# Patient Record
Sex: FEMALE | Race: WHITE | NOT HISPANIC OR LATINO | Employment: OTHER | URBAN - METROPOLITAN AREA
[De-identification: names, ages, dates, MRNs, and addresses within clinical notes are randomized per-mention and may not be internally consistent; named-entity substitution may affect disease eponyms.]

---

## 2019-03-21 ENCOUNTER — RECORDS - HEALTHEAST (OUTPATIENT)
Dept: LAB | Facility: CLINIC | Age: 79
End: 2019-03-21

## 2019-03-21 LAB
HGB BLD-MCNC: 9.4 G/DL (ref 12–16)
PLATELET # BLD AUTO: 282 THOU/UL (ref 140–440)

## 2019-03-27 ENCOUNTER — RECORDS - HEALTHEAST (OUTPATIENT)
Dept: LAB | Facility: CLINIC | Age: 79
End: 2019-03-27

## 2019-03-28 LAB — PLATELET # BLD AUTO: 336 THOU/UL (ref 140–440)

## 2021-09-02 ENCOUNTER — TRANSFERRED RECORDS (OUTPATIENT)
Dept: HEALTH INFORMATION MANAGEMENT | Facility: CLINIC | Age: 81
End: 2021-09-02

## 2021-09-02 LAB
ALBUMIN UR-MCNC: 30 MG/DL
ANION GAP SERPL CALCULATED.3IONS-SCNC: 9 MMOL/L (ref 5–18)
APPEARANCE UR: ABNORMAL
BACTERIA #/AREA URNS HPF: ABNORMAL /HPF
BASOPHILS # BLD AUTO: 0 10E3/UL (ref 0–0.2)
BASOPHILS NFR BLD AUTO: 0 %
BILIRUB UR QL STRIP: NEGATIVE
BUN SERPL-MCNC: 12 MG/DL (ref 8–28)
CALCIUM SERPL-MCNC: 9.4 MG/DL (ref 8.5–10.5)
CHLORIDE BLD-SCNC: 101 MMOL/L (ref 98–107)
CO2 SERPL-SCNC: 28 MMOL/L (ref 22–31)
COLOR UR AUTO: YELLOW
CREAT SERPL-MCNC: 0.78 MG/DL (ref 0.6–1.1)
EOSINOPHIL # BLD AUTO: 0 10E3/UL (ref 0–0.7)
EOSINOPHIL NFR BLD AUTO: 0 %
ERYTHROCYTE [DISTWIDTH] IN BLOOD BY AUTOMATED COUNT: 14.8 % (ref 10–15)
GFR SERPL CREATININE-BSD FRML MDRD: 73 ML/MIN/1.73M2
GLUCOSE BLD-MCNC: 115 MG/DL (ref 70–125)
GLUCOSE UR STRIP-MCNC: NEGATIVE MG/DL
HCT VFR BLD AUTO: 36 % (ref 35–47)
HGB BLD-MCNC: 11.5 G/DL (ref 11.7–15.7)
HGB UR QL STRIP: ABNORMAL
IMM GRANULOCYTES # BLD: 0.1 10E3/UL
IMM GRANULOCYTES NFR BLD: 1 %
KETONES UR STRIP-MCNC: 5 MG/DL
LEUKOCYTE ESTERASE UR QL STRIP: ABNORMAL
LYMPHOCYTES # BLD AUTO: 0.9 10E3/UL (ref 0.8–5.3)
LYMPHOCYTES NFR BLD AUTO: 7 %
MCH RBC QN AUTO: 33.3 PG (ref 26.5–33)
MCHC RBC AUTO-ENTMCNC: 31.9 G/DL (ref 31.5–36.5)
MCV RBC AUTO: 104 FL (ref 78–100)
MONOCYTES # BLD AUTO: 1.9 10E3/UL (ref 0–1.3)
MONOCYTES NFR BLD AUTO: 13 %
NEUTROPHILS # BLD AUTO: 11.3 10E3/UL (ref 1.6–8.3)
NEUTROPHILS NFR BLD AUTO: 79 %
NITRATE UR QL: NEGATIVE
NRBC # BLD AUTO: 0 10E3/UL
NRBC BLD AUTO-RTO: 0 /100
PH UR STRIP: 5.5 [PH] (ref 5–7)
PLATELET # BLD AUTO: 209 10E3/UL (ref 150–450)
POTASSIUM BLD-SCNC: 4 MMOL/L (ref 3.5–5)
RBC # BLD AUTO: 3.45 10E6/UL (ref 3.8–5.2)
RBC URINE: 25 /HPF
SODIUM SERPL-SCNC: 138 MMOL/L (ref 136–145)
SP GR UR STRIP: 1.02 (ref 1–1.03)
SQUAMOUS EPITHELIAL: 0 /HPF
UROBILINOGEN UR STRIP-MCNC: NORMAL MG/DL
WBC # BLD AUTO: 14.2 10E3/UL (ref 4–11)
WBC URINE: >100 /HPF

## 2021-09-02 PROCEDURE — 96365 THER/PROPH/DIAG IV INF INIT: CPT

## 2021-09-02 PROCEDURE — 250N000011 HC RX IP 250 OP 636: Performed by: EMERGENCY MEDICINE

## 2021-09-02 PROCEDURE — 81001 URINALYSIS AUTO W/SCOPE: CPT | Performed by: EMERGENCY MEDICINE

## 2021-09-02 PROCEDURE — 96375 TX/PRO/DX INJ NEW DRUG ADDON: CPT

## 2021-09-02 PROCEDURE — 36415 COLL VENOUS BLD VENIPUNCTURE: CPT | Performed by: EMERGENCY MEDICINE

## 2021-09-02 PROCEDURE — 84484 ASSAY OF TROPONIN QUANT: CPT | Performed by: EMERGENCY MEDICINE

## 2021-09-02 PROCEDURE — 99285 EMERGENCY DEPT VISIT HI MDM: CPT | Mod: 25

## 2021-09-02 PROCEDURE — 85025 COMPLETE CBC W/AUTO DIFF WBC: CPT | Performed by: EMERGENCY MEDICINE

## 2021-09-02 PROCEDURE — 80048 BASIC METABOLIC PNL TOTAL CA: CPT | Performed by: EMERGENCY MEDICINE

## 2021-09-02 PROCEDURE — C9803 HOPD COVID-19 SPEC COLLECT: HCPCS

## 2021-09-02 PROCEDURE — 87086 URINE CULTURE/COLONY COUNT: CPT | Performed by: EMERGENCY MEDICINE

## 2021-09-02 RX ORDER — ONDANSETRON 4 MG/1
4 TABLET, ORALLY DISINTEGRATING ORAL ONCE
Status: COMPLETED | OUTPATIENT
Start: 2021-09-02 | End: 2021-09-02

## 2021-09-02 RX ADMIN — ONDANSETRON 4 MG: 4 TABLET, ORALLY DISINTEGRATING ORAL at 23:59

## 2021-09-02 ASSESSMENT — MIFFLIN-ST. JEOR: SCORE: 1217.51

## 2021-09-03 ENCOUNTER — APPOINTMENT (OUTPATIENT)
Dept: CT IMAGING | Facility: CLINIC | Age: 81
DRG: 872 | End: 2021-09-03
Attending: EMERGENCY MEDICINE
Payer: COMMERCIAL

## 2021-09-03 ENCOUNTER — HOSPITAL ENCOUNTER (INPATIENT)
Facility: CLINIC | Age: 81
LOS: 2 days | Discharge: HOME | DRG: 872 | End: 2021-09-05
Attending: EMERGENCY MEDICINE | Admitting: HOSPITALIST
Payer: COMMERCIAL

## 2021-09-03 DIAGNOSIS — R11.2 NON-INTRACTABLE VOMITING WITH NAUSEA, UNSPECIFIED VOMITING TYPE: ICD-10-CM

## 2021-09-03 DIAGNOSIS — N30.00 ACUTE CYSTITIS WITHOUT HEMATURIA: Primary | ICD-10-CM

## 2021-09-03 DIAGNOSIS — N95.2 ATROPHIC VAGINITIS: ICD-10-CM

## 2021-09-03 DIAGNOSIS — I48.91 ATRIAL FIBRILLATION WITH RVR (H): ICD-10-CM

## 2021-09-03 DIAGNOSIS — N30.01 ACUTE CYSTITIS WITH HEMATURIA: ICD-10-CM

## 2021-09-03 LAB
FOLATE SERPL-MCNC: 15.6 NG/ML
LACTATE SERPL-SCNC: 1.4 MMOL/L (ref 0.7–2)
SARS-COV-2 RNA RESP QL NAA+PROBE: NEGATIVE
TROPONIN I SERPL-MCNC: <0.01 NG/ML (ref 0–0.29)
VIT B12 SERPL-MCNC: 284 PG/ML (ref 213–816)

## 2021-09-03 PROCEDURE — 36415 COLL VENOUS BLD VENIPUNCTURE: CPT | Performed by: EMERGENCY MEDICINE

## 2021-09-03 PROCEDURE — 93005 ELECTROCARDIOGRAM TRACING: CPT | Performed by: EMERGENCY MEDICINE

## 2021-09-03 PROCEDURE — 82607 VITAMIN B-12: CPT | Performed by: HOSPITALIST

## 2021-09-03 PROCEDURE — 250N000011 HC RX IP 250 OP 636: Performed by: HOSPITALIST

## 2021-09-03 PROCEDURE — 36415 COLL VENOUS BLD VENIPUNCTURE: CPT | Performed by: HOSPITALIST

## 2021-09-03 PROCEDURE — 87040 BLOOD CULTURE FOR BACTERIA: CPT | Performed by: EMERGENCY MEDICINE

## 2021-09-03 PROCEDURE — 250N000013 HC RX MED GY IP 250 OP 250 PS 637: Performed by: FAMILY MEDICINE

## 2021-09-03 PROCEDURE — 250N000013 HC RX MED GY IP 250 OP 250 PS 637: Performed by: HOSPITALIST

## 2021-09-03 PROCEDURE — 74176 CT ABD & PELVIS W/O CONTRAST: CPT

## 2021-09-03 PROCEDURE — 99233 SBSQ HOSP IP/OBS HIGH 50: CPT | Performed by: FAMILY MEDICINE

## 2021-09-03 PROCEDURE — 87635 SARS-COV-2 COVID-19 AMP PRB: CPT | Performed by: EMERGENCY MEDICINE

## 2021-09-03 PROCEDURE — 120N000001 HC R&B MED SURG/OB

## 2021-09-03 PROCEDURE — 82746 ASSAY OF FOLIC ACID SERUM: CPT | Performed by: HOSPITALIST

## 2021-09-03 PROCEDURE — 99223 1ST HOSP IP/OBS HIGH 75: CPT | Performed by: HOSPITALIST

## 2021-09-03 PROCEDURE — 250N000009 HC RX 250: Performed by: EMERGENCY MEDICINE

## 2021-09-03 PROCEDURE — 258N000003 HC RX IP 258 OP 636: Performed by: EMERGENCY MEDICINE

## 2021-09-03 PROCEDURE — 83605 ASSAY OF LACTIC ACID: CPT | Performed by: EMERGENCY MEDICINE

## 2021-09-03 PROCEDURE — 258N000003 HC RX IP 258 OP 636: Performed by: HOSPITALIST

## 2021-09-03 PROCEDURE — 250N000011 HC RX IP 250 OP 636: Performed by: EMERGENCY MEDICINE

## 2021-09-03 RX ORDER — TRAZODONE HYDROCHLORIDE 50 MG/1
50 TABLET, FILM COATED ORAL AT BEDTIME
COMMUNITY

## 2021-09-03 RX ORDER — CEFTRIAXONE 1 G/1
1 INJECTION, POWDER, FOR SOLUTION INTRAMUSCULAR; INTRAVENOUS ONCE
Status: COMPLETED | OUTPATIENT
Start: 2021-09-03 | End: 2021-09-03

## 2021-09-03 RX ORDER — METOPROLOL SUCCINATE 50 MG/1
50 TABLET, EXTENDED RELEASE ORAL AT BEDTIME
COMMUNITY

## 2021-09-03 RX ORDER — PANTOPRAZOLE SODIUM 20 MG/1
20 TABLET, DELAYED RELEASE ORAL
Status: DISCONTINUED | OUTPATIENT
Start: 2021-09-04 | End: 2021-09-05 | Stop reason: HOSPADM

## 2021-09-03 RX ORDER — ONDANSETRON 4 MG/1
4 TABLET, ORALLY DISINTEGRATING ORAL EVERY 6 HOURS PRN
Status: DISCONTINUED | OUTPATIENT
Start: 2021-09-03 | End: 2021-09-05 | Stop reason: HOSPADM

## 2021-09-03 RX ORDER — CEFTRIAXONE 1 G/1
1 INJECTION, POWDER, FOR SOLUTION INTRAMUSCULAR; INTRAVENOUS EVERY 24 HOURS
Status: DISCONTINUED | OUTPATIENT
Start: 2021-09-04 | End: 2021-09-05 | Stop reason: HOSPADM

## 2021-09-03 RX ORDER — ONDANSETRON 2 MG/ML
4 INJECTION INTRAMUSCULAR; INTRAVENOUS ONCE
Status: COMPLETED | OUTPATIENT
Start: 2021-09-03 | End: 2021-09-03

## 2021-09-03 RX ORDER — MECOBALAMIN 5000 MCG
15 TABLET,DISINTEGRATING ORAL EVERY MORNING
Status: DISCONTINUED | OUTPATIENT
Start: 2021-09-04 | End: 2021-09-03 | Stop reason: CLARIF

## 2021-09-03 RX ORDER — METOPROLOL SUCCINATE 25 MG/1
25 TABLET, EXTENDED RELEASE ORAL DAILY
Status: DISCONTINUED | OUTPATIENT
Start: 2021-09-03 | End: 2021-09-05 | Stop reason: HOSPADM

## 2021-09-03 RX ORDER — LEUCOVORIN CALCIUM 5 MG/1
5 TABLET ORAL
COMMUNITY
End: 2023-07-02

## 2021-09-03 RX ORDER — METOPROLOL TARTRATE 1 MG/ML
5 INJECTION, SOLUTION INTRAVENOUS ONCE
Status: COMPLETED | OUTPATIENT
Start: 2021-09-03 | End: 2021-09-03

## 2021-09-03 RX ORDER — ACETAMINOPHEN 500 MG
1000 TABLET ORAL AT BEDTIME
COMMUNITY

## 2021-09-03 RX ORDER — ACETAMINOPHEN 325 MG/1
650 TABLET ORAL EVERY 6 HOURS PRN
Status: DISCONTINUED | OUTPATIENT
Start: 2021-09-03 | End: 2021-09-05 | Stop reason: HOSPADM

## 2021-09-03 RX ORDER — PREDNISONE 1 MG/1
3 TABLET ORAL EVERY MORNING
Status: DISCONTINUED | OUTPATIENT
Start: 2021-09-04 | End: 2021-09-05 | Stop reason: HOSPADM

## 2021-09-03 RX ORDER — CEPHALEXIN 500 MG/1
500 CAPSULE ORAL 3 TIMES DAILY
Status: ON HOLD | COMMUNITY
End: 2021-09-03

## 2021-09-03 RX ORDER — ACETAMINOPHEN 650 MG/1
650 SUPPOSITORY RECTAL EVERY 6 HOURS PRN
Status: DISCONTINUED | OUTPATIENT
Start: 2021-09-03 | End: 2021-09-05 | Stop reason: HOSPADM

## 2021-09-03 RX ORDER — PREDNISONE 5 MG/1
10 TABLET ORAL EVERY MORNING
COMMUNITY

## 2021-09-03 RX ORDER — SODIUM CHLORIDE, SODIUM LACTATE, POTASSIUM CHLORIDE, CALCIUM CHLORIDE 600; 310; 30; 20 MG/100ML; MG/100ML; MG/100ML; MG/100ML
INJECTION, SOLUTION INTRAVENOUS CONTINUOUS
Status: DISCONTINUED | OUTPATIENT
Start: 2021-09-03 | End: 2021-09-05 | Stop reason: HOSPADM

## 2021-09-03 RX ORDER — TRAZODONE HYDROCHLORIDE 50 MG/1
50 TABLET, FILM COATED ORAL AT BEDTIME
Status: DISCONTINUED | OUTPATIENT
Start: 2021-09-03 | End: 2021-09-05 | Stop reason: HOSPADM

## 2021-09-03 RX ORDER — LIDOCAINE 40 MG/G
CREAM TOPICAL
Status: DISCONTINUED | OUTPATIENT
Start: 2021-09-03 | End: 2021-09-05 | Stop reason: HOSPADM

## 2021-09-03 RX ORDER — ONDANSETRON 2 MG/ML
4 INJECTION INTRAMUSCULAR; INTRAVENOUS EVERY 6 HOURS PRN
Status: DISCONTINUED | OUTPATIENT
Start: 2021-09-03 | End: 2021-09-05 | Stop reason: HOSPADM

## 2021-09-03 RX ORDER — MECOBALAMIN 5000 MCG
15 TABLET,DISINTEGRATING ORAL EVERY MORNING
COMMUNITY

## 2021-09-03 RX ORDER — ATOVAQUONE 750 MG/5ML
1500 SUSPENSION ORAL EVERY MORNING
COMMUNITY
End: 2023-07-02

## 2021-09-03 RX ORDER — ALENDRONATE SODIUM 70 MG/1
70 TABLET ORAL
COMMUNITY
End: 2023-07-02

## 2021-09-03 RX ORDER — ESTRADIOL 0.1 MG/G
1 CREAM VAGINAL
COMMUNITY
End: 2023-07-02

## 2021-09-03 RX ADMIN — ACETAMINOPHEN 650 MG: 325 TABLET ORAL at 20:34

## 2021-09-03 RX ADMIN — ONDANSETRON 4 MG: 2 INJECTION INTRAMUSCULAR; INTRAVENOUS at 08:41

## 2021-09-03 RX ADMIN — METOPROLOL SUCCINATE 25 MG: 25 TABLET, EXTENDED RELEASE ORAL at 12:16

## 2021-09-03 RX ADMIN — TRAZODONE HYDROCHLORIDE 50 MG: 50 TABLET ORAL at 23:35

## 2021-09-03 RX ADMIN — ONDANSETRON 4 MG: 2 INJECTION INTRAMUSCULAR; INTRAVENOUS at 04:49

## 2021-09-03 RX ADMIN — SODIUM CHLORIDE, POTASSIUM CHLORIDE, SODIUM LACTATE AND CALCIUM CHLORIDE: 600; 310; 30; 20 INJECTION, SOLUTION INTRAVENOUS at 08:46

## 2021-09-03 RX ADMIN — APIXABAN 5 MG: 5 TABLET, FILM COATED ORAL at 20:35

## 2021-09-03 RX ADMIN — CEFTRIAXONE 1 G: 1 INJECTION, POWDER, FOR SOLUTION INTRAMUSCULAR; INTRAVENOUS at 04:09

## 2021-09-03 RX ADMIN — SODIUM CHLORIDE, POTASSIUM CHLORIDE, SODIUM LACTATE AND CALCIUM CHLORIDE: 600; 310; 30; 20 INJECTION, SOLUTION INTRAVENOUS at 15:28

## 2021-09-03 RX ADMIN — METOPROLOL TARTRATE 5 MG: 1 INJECTION, SOLUTION INTRAVENOUS at 06:56

## 2021-09-03 RX ADMIN — APIXABAN 5 MG: 5 TABLET, FILM COATED ORAL at 12:16

## 2021-09-03 RX ADMIN — METOPROLOL TARTRATE 5 MG: 1 INJECTION, SOLUTION INTRAVENOUS at 05:35

## 2021-09-03 RX ADMIN — SODIUM CHLORIDE 1000 ML: 9 INJECTION, SOLUTION INTRAVENOUS at 04:27

## 2021-09-03 RX ADMIN — ACETAMINOPHEN 650 MG: 325 TABLET ORAL at 15:40

## 2021-09-03 ASSESSMENT — ACTIVITIES OF DAILY LIVING (ADL)
FALL_HISTORY_WITHIN_LAST_SIX_MONTHS: NO
DIFFICULTY_EATING/SWALLOWING: NO
HEARING_DIFFICULTY_OR_DEAF: NO
WEAR_GLASSES_OR_BLIND: NO
DRESSING/BATHING_DIFFICULTY: NO
TOILETING_ISSUES: NO
DOING_ERRANDS_INDEPENDENTLY_DIFFICULTY: NO
WALKING_OR_CLIMBING_STAIRS_DIFFICULTY: NO
CONCENTRATING,_REMEMBERING_OR_MAKING_DECISIONS_DIFFICULTY: NO
DIFFICULTY_COMMUNICATING: NO
EQUIPMENT_CURRENTLY_USED_AT_HOME: GRAB BAR, TUB/SHOWER

## 2021-09-03 NOTE — PHARMACY-ADMISSION MEDICATION HISTORY
Pharmacy Note - Admission Medication History    Pertinent Provider Information:     Patient on cephalexin 500mg TID for UTI- reports that she started the course yesterday (9/2/21) and took one dose yesterday    Patient finished course of prednisone 20mg daily on Wednesday (9/1/21) and now takes 3mg daily.    Patient due for weekly methotrexate today (9/3/21)   ______________________________________________________________________    Prior To Admission (PTA) med list completed and updated in EMR.       PTA Med List   Medication Sig Note Last Dose     acetaminophen (TYLENOL) 325 MG tablet Take 650 mg by mouth every 6 hours as needed for mild pain  9/2/2021 at Unknown time     alendronate (FOSAMAX) 70 MG tablet Take 70 mg by mouth every 7 days  8/25/2021 at Unknown time     apixaban ANTICOAGULANT (ELIQUIS) 5 MG tablet Take 5 mg by mouth 2 times daily  9/2/2021 at AM     atovaquone (MEPRON) 750 MG/5ML suspension Take 1,500 mg by mouth every morning  9/1/2021 at AM     cephALEXin (KEFLEX) 500 MG capsule Take 500 mg by mouth 3 times daily For 7 days 9/3/2021: Reports she took one dose total 9/2/2021 at AM     LANsoprazole (PREVACID) 15 MG DR capsule Take 15 mg by mouth every morning  9/1/2021 at AM     leucovorin (WELLCOVORIN) 5 MG tablet Take 5 mg by mouth every 7 days Take the day after methotrexate on Saturdays AM  8/28/2021 at AM     methotrexate 2.5 MG tablet Take 20 mg by mouth every 7 days Take the day before leucovorin 9/3/2021: Due for medication today 9/3/21 8/27/2021 at AM     metoprolol succinate ER (TOPROL-XL) 25 MG 24 hr tablet Take 25 mg by mouth every morning  9/2/2021 at AM     predniSONE (DELTASONE) 1 MG tablet Take 3 mg by mouth every morning  9/2/2021 at AM     traZODone (DESYREL) 50 MG tablet Take 50 mg by mouth At Bedtime  9/1/2021 at PM     UNKNOWN TO PATIENT Take 1 chew tab by mouth daily 9/3/2021: Takes vitamin B12 chew tablets, strength and brand unknown Past Week at Unknown time     UNKNOWN TO  PATIENT Take 2 tablets by mouth daily 9/3/2021: Takes glucosamine daily, brand and strength unknown Past Week at Unknown time       Information source(s): Patient and CareEverywhere/SureScripts  Method of interview communication: in-person    Summary of Changes to PTA Med List  New: All listed medications   Discontinued: none  Changed: none    Patient was asked about OTC/herbal products specifically.  PTA med list reflects this.    In the past week, patient estimated taking medication this percent of the time:  50-90% due to illness.    Allergies were reviewed, assessed, and updated with the patient.      Patient does not use any multi-dose medications prior to admission.    The information provided in this note is only as accurate as the sources available at the time of the update(s).    Thank you for the opportunity to participate in the care of this patient.    Tiffany Pandya  9/3/2021 10:00 AM

## 2021-09-03 NOTE — H&P
"Hospital Medicine Service History and Physical  [facility]: [facility]    Lilian La is a 81 year old female who  has no past medical history on file.  Fibromyalgia, dyslipidemia  Chief Complaint: Urinary symptoms    Assessment and Plan  Acute hemorrhagic cystitis & pyelonephritis  Flank tenderness right side  Continue ceftriaxone started in the ED  Several courses of antibiotics recently for UTIs  She has had pansensitive E. coli in the past  Likely affected by daily prednisone  Urology consult  Urine culture pending    Sepsis  Lactate normal  Secondary to urinary source  WBC and tachycardia  ED MD to give bolus now  Order IVF thereafter    Macrocytic anemia  Hemoglobin 11.5  Check B12, folate    Atrial fibrillation  Irregular on exam  Reports she takes Eliquis    Immunocompromise  On daily prednisone for temporal arteritis    DVTP: ambulate  Code Status: No Order full code, would readdress this with the patient and she seems to flip-flop during our conversation  Disposition: Inpatient   Estimated body mass index is 25.82 kg/m  as calculated from the following:    Height as of this encounter: 1.676 m (5' 6\").    Weight as of this encounter: 72.6 kg (160 lb).    History of Present Illness  History obtained through patient and son present.  Lilian La says she has been struggling with urinary tract infections for the past 3 months.  Has had numerous outpatient antibiotic treatments which improved her symptoms and then within 5 days of completing the antibiotic course she feels unwell again.  Similarly she just finished antibiotics couple days ago and has been feeling weak and fatigued.  She has had some right-sided flank pain with radiation to the groin.  She had emesis at primary care office today and was therefore directed to the emergency department.  She has a little bit of nausea now, otherwise does not feel chest pain shortness of breath.  She denies abdominal pain.  She does not report " any new symptoms associated with temporal arteritis, feels her vision is at her normal. All other systems reviewed and are negative    In the ED, Lilian La presents afebrile, slightly tachycardic, stable blood pressure, satting well on room air.  Leukocytosis 14 K, hemoglobin 11.5.  Lactic acid negative, normal metabolic panel.  Urine is cloudy with microscopic blood, nitrite negative, greater than 100 WBCs and many bacteria.  Urine culture pending.  CT abdomen pelvis no acute findings, has diverticulosis, hepatic steatosis.  Received ceftriaxone.    Appears fatigued  Anicteric conjunctiva, PERRL  Moist mucous membranes, poor dentition  Trachea midline, no JVD  Faint base crackles otherwise clear to auscultation, Respiratory effort normal  Hyperkyphosis  Irregular, no murmur  Abdomen soft, nondistended, nontender  Flank tenderness to fist percussion, right  No edema, clubbing absent  Skin normal temperature, dry  Polite, normal affect, alert  Vital signs reviewed by me    Wt Readings from Last 4 Encounters:   09/02/21 72.6 kg (160 lb)        Never smoker  family history is not on file.  Mother-ALS   has no past surgical history on file.  Cholecystectomy  Allergies   Allergen Reactions     Sulfa Drugs Other (See Comments)     Unknown       Иван Penaloza MD, MPH  [place] [facility]  [facility]   Phone: #[phone]

## 2021-09-03 NOTE — CONSULTS
[facility] CONSULTATION     Type of Consult: inpatient  Place of Service: [facility]  Reason for consult: Recurrent urinary tract infections (UTIs)  Request for consult by: Dr. Иван Penaloza    History of present illness:   Lilian La is a 81 year old female admitted to hospital with urinary tract infection. Urology is being consulted for recurrent urinary tract infections (UTI). History obtained from patient and chart review.     Patient recently had urinalysis and urine culture completed on 9/1/21 for symptoms of a UTI. She states she picked the antibiotic prescription up yesterday but only took one dose. She reports pain with urination, pain in the lower abdomen and across the lower back, nausea with vomiting, and rigors at home. Reports prior UTIs have not been this severe. She presented to the emergency department for further evaluation and treatment. In the emergency department, white blood cell count elevated at 14.2. Creatinine within normal limits at 0.78. Urinalysis with 500 leukocyte esterase, many bacteria, 25 RBC, >100 WBC. Urine and blood cultures are in process. Noncontrast CT of the abdomen and pelvis 9/3/21 negative for hydronephrosis or urinary calculi.     Patient reports multiple UTIs in the past 6 months. Prior to this year, reports 3-4 UTIs per year. She has had multiple urinalyses and urine cultures in the past 6 months that have been positive. Recently urine cultures 7/24/21, 8/13/21, and 9/1/21 have been positive for Escherichia coli. She denies history of kidney stones or gross hematuria. At baseline she voids every few hours during the day, up to 3 times per night, denies incontinence.     Reports history of irritable bowel syndrome and loose stools, takes fiber supplement daily. Denies fecal incontinence.   Surgeries on her abdomen and pelvis include appendectomy in childhood. She has had 5 prior vaginal deliveries. Denies symptomatic prolapse. She is not sexually active.  Denies smoking history.   Of note, she is on daily prednisone for history of temporal arteritis.       Previous urine cultures:   7/24/21 - Escherichia coli  8/13/21 - Escherichia coli  9/1/21 - Escherichia coli    Urinalyses 3/31/21, 5/18/21, 8/2/21 also appeared positive for infection.    Past medical history:  Giant cell arteritis  Hyperlipidemia  Irritable bowel syndrome  Atrial fibrillation       Past Surgical History:  Appendectomy      Social History:  Social History     Socioeconomic History     Marital status:      Spouse name: Not on file     Number of children: Not on file     Years of education: Not on file     Highest education level: Not on file   Occupational History     Not on file   Tobacco Use     Smoking status: Not on file   Substance and Sexual Activity     Alcohol use: Not on file     Drug use: Not on file     Sexual activity: Not on file   Other Topics Concern     Not on file   Social History Narrative     Not on file     Social Determinants of Health     Financial Resource Strain:      Difficulty of Paying Living Expenses:    Food Insecurity:      Worried About Running Out of Food in the Last Year:      Ran Out of Food in the Last Year:    Transportation Needs:      Lack of Transportation (Medical):      Lack of Transportation (Non-Medical):    Physical Activity:      Days of Exercise per Week:      Minutes of Exercise per Session:    Stress:      Feeling of Stress :    Social Connections:      Frequency of Communication with Friends and Family:      Frequency of Social Gatherings with Friends and Family:      Attends [employer] Services:      Active Member of Clubs or Organizations:      Attends Club or Organization Meetings:      Marital Status:    Intimate Partner Violence:      Fear of Current or Ex-Partner:      Emotionally Abused:      Physically Abused:      Sexually Abused:        Medications:  Current Facility-Administered Medications   Medication     acetaminophen (TYLENOL)  "tablet 650 mg    Or     acetaminophen (TYLENOL) Suppository 650 mg     apixaban ANTICOAGULANT (ELIQUIS) tablet 5 mg     [START ON 9/4/2021] cefTRIAXone (ROCEPHIN) 1 g vial to attach to  mL bag for ADULTS or NS 50 mL bag for PEDS     lactated ringers infusion     lidocaine (LMX4) cream     lidocaine 1 % 0.1-1 mL     melatonin tablet 1 mg     metoprolol succinate ER (TOPROL-XL) 24 hr tablet 25 mg     ondansetron (ZOFRAN-ODT) ODT tab 4 mg    Or     ondansetron (ZOFRAN) injection 4 mg     sodium chloride (PF) 0.9% PF flush 3 mL     sodium chloride (PF) 0.9% PF flush 3 mL       Allergies:   Allergies   Allergen Reactions     Sulfa Drugs Other (See Comments)     Passed out. Occurred in childhood       Review of systems:   A full 12 point review of systems was taken and is negative aside from what is noted above in the HPI.     Physical examination:  Vital signs:  Temp: 98.6  F (37  C) Temp src: Oral BP: (!) 127/95 Pulse: 100   Resp: 22 SpO2: 98 % O2 Device: None (Room air)   Height: 167.6 cm (5' 6\") Weight: 72.6 kg (160 lb)  Estimated body mass index is 25.82 kg/m  as calculated from the following:    Height as of this encounter: 1.676 m (5' 6\").    Weight as of this encounter: 72.6 kg (160 lb).    General: NAD, alert, cooperative  Head: normocephalic, without abnormality / atraumatic  Abdomen: soft, non-tender, non-distended. No suprapubic fullness noted, no suprapubic tenderness noted. No CVA tenderness noted.   Genitourinary: deferred  Skin: no rashes or lesions  Musculoskeletal: moves all four extremities equally  Psychological: alert and oriented, answers questions appropriately      Labs:   Lab Results   Component Value Date    WBC 14.2 (H) 09/02/2021    HGB 11.5 (L) 09/02/2021    HCT 36.0 09/02/2021     09/02/2021     09/02/2021    BUN 12 09/02/2021    CO2 28 09/02/2021       Lab Results   Component Value Date    NITRITE Negative 09/02/2021    BACTERIA Many (A) 09/02/2021         Cultures:  Urine " Culture: pending    Blood Culture: pending    Lab Results: personally reviewed     Imaging:  Results for orders placed or performed during the hospital encounter of 09/03/21   Abd/pelvis CT no contrast - Stone Protocol    Impression    IMPRESSION: Within the limitation of noncontrast exam, there is:  1.  No evidence of acute pathology in the abdomen or pelvis. No radiodense kidney/ureteral stones or hydronephrosis in either kidney.  2.  Colonic diverticulosis without CT evidence of acute diverticulitis.  3.  Hepatic steatosis.         I have personally reviewed the imaging reports above.        Assessment / plan: Lilian La is being seen by [facility] for recurrent UTIs.     - Continue with broad spectrum antibiotics until culture and sensitivities return, currently on ceftriaxone which is appropriate based on urine culture from 9/1/21. Recommend treatment of current UTI with 10-14 day treatment course.   - After she has been treated for acute UTI, recommend initiating low dose suppressive antibiotic with cephalexin 250 mg once daily for 90 days.   - Repeat bladder scan to ensure she is emptying adequately. Discussed incomplete bladder emptying can contribute to frequent UTIs.   - Discussed general prevention measures including timed voiding, adequate hydration, cranberry supplement, management of bowels.   - Recommend initiating vaginal estrogen cream at discharge, she has recent prescription for this at home. Discussed initiating fingertip application vaginally (0.5 g) nightly for 2 weeks, then 2-3 nights per week thereafter. Discussed risks versus benefits of use and importance in UTI prevention.   - Noncontrast CT today negative.   - Additional workup / testing ordered: clinic follow up with female urology provider at [facility]. Email sent to scheduling to facilitate.       [facility] will sign off at this time. Please call with questions or concerns.   Thank you for consulting  [facility] regarding this patient's care.       Reynaldo Lopez PA-C  [facility]   [phone]

## 2021-09-03 NOTE — PROGRESS NOTES
[facility] MEDICINE PROGRESS NOTE      Identification/Summary: Lilian aL is a 81 year old female with a past medical history of chronic atrial fibrillation, on chronic anticoagulation treatment, temporal arteritis, on chronic steroid, fibromyalgia, dyslipidemia, recurrent UTI came with right-sided flank pain radiating to the groin found to have UTI.  Started on IV ceftriaxone.  Evaluated by urology.  He has multiple UTIs in the past 6 months.  Urine culture grew E. coli in 7/21, 8/21, 9/21.  Denies history of kidney stones or gross hematuria.  Recommended to start prophylactic antibiotic for 90-day after antibiotic course for this UTI.  Will start vaginal estrogen cream.  Remains hemodynamically stable.    Assessment and Plan:  Acute on recurrent UTI  Right pyelonephritis  Sepsis   Started on IV ceftriaxone 1 g daily  Awaiting for urine culture  Will treat antibiotic for 10 to 14 days then prophylactic antibiotic for 90-day  Vaginal estrogen cream  Appreciate urology consult    Atrial fibrillation  Heart rate is stable  Metoprolol 25 mg daily  On chronic anticoagulation treatment    Temporal arteritis  On chronic steroid, methotrexate, atovaquone  Seeing hematology as outpatient    GERD, resume PPI  Code full  DVT prophylaxis  On chronic anticoagulation treatment  Barrier to discharge  Awaiting for more clinical improvement.  Anticipated discharge in 1 day    Interval History/Subjective:  Resting comfortably.  Afebrile.  Feeling much better after IV hydration and antibiotic.  Son is present.    Physical Exam/Objective:  Temp:  [98.6  F (37  C)] 98.6  F (37  C)  Pulse:  [] 98  Resp:  [14-30] 18  BP: (107-174)/(52-95) 153/81  SpO2:  [71 %-100 %] 92 %    Body mass index is 25.82 kg/m .    GENERAL:  Alert, appears comfortable, in no acute distress, appears stated age   HEAD:  Normocephalic, without obvious abnormality, atraumatic   EYES:  PERRL, conjunctiva  clear,   EOM's intact    NOSE: Nares normal, septum midline,  no drainage   THROAT: Lips, mucosa,  gums normal, mouth moist   NECK: Supple, symmetrical, trachea midline   BACK:   Symmetric, no curvature, ROM normal   LUNGS:   Clear to auscultation bilaterally, no rales, rhonchi, or wheezing, symmetric chest rise on inhalation, respirations unlabored   CHEST WALL:  No tenderness or deformity   HEART:  Regular rate and rhythm, S1 and S2 normal, no murmur    ABDOMEN:   Soft, non-tender, bowel sounds active , no masses, no organomegaly    EXTREMITIES: No  edema    SKIN: No rashes   NEURO: Alert, oriented x3, moves all four extremities freely   PSYCH: Cooperative, behavior is appropriate      Medications:   Personally Reviewed.  Medications     lactated ringers 100 mL/hr at 09/03/21 1528       apixaban ANTICOAGULANT  5 mg Oral BID     [START ON 9/4/2021] cefTRIAXone  1 g Intravenous Q24H     metoprolol succinate ER  25 mg Oral Daily     sodium chloride (PF)  3 mL Intracatheter Q8H       Data reviewed today: I personally reviewed all new medications, labs, imaging/diagnostics reports over the past 24 hours. Pertinent findings include:    Imaging:   Recent Results (from the past 24 hour(s))   Abd/pelvis CT no contrast - Stone Protocol    Narrative    EXAM: CT ABDOMEN PELVIS W/O CONTRAST  LOCATION: [facility]  DATE/TIME: 9/3/2021 2:32 AM    INDICATION: Flank pain, kidney stone suspected  COMPARISON: None available  TECHNIQUE: CT scan of the abdomen and pelvis was performed without IV contrast. Multiplanar reformats were obtained. Dose reduction techniques were used.  CONTRAST: None.    FINDINGS:   LOWER CHEST: Lung bases are clear. Small hiatal hernia.    ABDOMEN/PELVIS: Limited evaluation of the abdominal organs due to lack of IV contrast.    HEPATOBILIARY: Right upper quadrant postcholecystectomy clips. Diffuse hypoattenuation of the liver, likely due to underlying hepatic steatosis.    PANCREAS: The unenhanced  pancreas is grossly unremarkable.    SPLEEN: No splenomegaly.    ADRENAL GLANDS: No adrenal nodules.    KIDNEYS/BLADDER: No radiodense kidney/ureteral stones or hydronephrosis in either kidney.    BOWEL: No abnormally dilated bowel loops. Colonic diverticulosis without CT evidence of acute diverticulitis.    PERITONEUM: No significant free fluid in the abdomen or pelvis.    LYMPH NODES: No significant abdominopelvic lymphadenopathy.    VASCULATURE: Moderate atherosclerotic vascular calcification of the abdominal aorta and iliac vessels.    PELVIC ORGANS: Unremarkable.    MUSCULOSKELETAL: Post surgical changes of right femoral ORIF. Multilevel degenerative changes of the spine. No suspicious osseous lesion.      Impression    IMPRESSION: Within the limitation of noncontrast exam, there is:  1.  No evidence of acute pathology in the abdomen or pelvis. No radiodense kidney/ureteral stones or hydronephrosis in either kidney.  2.  Colonic diverticulosis without CT evidence of acute diverticulitis.  3.  Hepatic steatosis.         Labs:  Most Recent 3 CBC's:Recent Labs   Lab Test 09/02/21  2304 03/28/19  0528 03/21/19  0857   WBC 14.2*  --   --    HGB 11.5*  --  9.4*   *  --   --     336 [id number]     Most Recent 3 BMP's:Recent Labs   Lab Test 09/02/21  2304      POTASSIUM 4.0   CHLORIDE 101   CO2 28   BUN 12   CR 0.78   ANIONGAP 9   YIN 9.4          Saira Del Rio MD  [facility]  [facility]  Phone: #[phone]

## 2021-09-03 NOTE — ED PROVIDER NOTES
EMERGENCY DEPARTMENT ENCOUnter      NAME: Lilian La  AGE: 81 year old female  YOB: 1940  MRN: [id number]  EVALUATION DATE & TIME: 9/3/2021  1:45 AM    PCP: No primary care provider on file.    ED PROVIDER: Dorina Ordoñez MD      Chief Complaint   Patient presents with     Urinary Frequency         FINAL IMPRESSION:  1. Acute cystitis with hematuria    2. Non-intractable vomiting with nausea, unspecified vomiting type    3. Atrial fibrillation with RVR (H)          ED COURSE & MEDICAL DECISION MAKING:    In summary, the patient is a 81-year-old female that presents to the emergency department for evaluation of nausea, vomiting, chills and urinary symptoms thought secondary to a recurrent urinary tract infection.  Patient just completed a course of antibiotics 4 days ago.  Has known atrial fibrillation and is noted to have a rapid ventricular response.  With her vomiting, we will admit to the hospital for IV antibiotics and further care and evaluation of her frequent urinary tract infections.  0201 Introduced myself to patient, gathered patient history, and performed an initial exam.  Ceftriaxone 1 g IV was administered for initiation of antibiotic therapy for treatment of urinary tract infection.  0357 Rechecked and updated patient on her results. Patient reports that she is feeling sick to her stomach and is not comfortable with going home. Paged hospitalist.   6983 Spoke with Dr. Penaloza, hospitalist, who agrees to accept patient for admission.  Zofran 4 mg IV was administered for nausea.  Normal saline 1 L IV was administered for IV hydration.  0520-Lopressor 5 mg IV x 2 was administered for atrial fib with RVR  0700-Signed out at change of shift boarding in the ED awaiting an inpatient bed assisgnment.    At the conclusion of the encounter I discussed the results of all of the tests and the disposition. The questions were answered. The patient or family acknowledged understanding and was  agreeable with the care plan.         MEDICATIONS GIVEN IN THE EMERGENCY:  Medications   ondansetron (ZOFRAN-ODT) ODT tab 4 mg (4 mg Oral Given 9/2/21 9624)   cefTRIAXone (ROCEPHIN) 1 g vial to attach to  mL bag for ADULTS or NS 50 mL bag for PEDS (0 g Intravenous Stopped 9/3/21 2232)   0.9% sodium chloride BOLUS (0 mLs Intravenous Stopped 9/3/21 9642)   ondansetron (ZOFRAN) injection 4 mg (4 mg Intravenous Given 9/3/21 0438)   metoprolol (LOPRESSOR) injection 5 mg (5 mg Intravenous Given 9/3/21 1422)       NEW PRESCRIPTIONS STARTED AT TODAY'S ER VISIT  New Prescriptions    No medications on file          =================================================================    HPI        Lilian La is a 81 year old female with a pertinent history of GCA, HLD, IBS, atrial fibrillation,  who presents to this ED by ambulance for evaluation of bladder infection. Patient presents with bladder infection that has been recurring for the past 5 months. She reports she recently finished a course of antibiotics 4-5 days ago. She endorses associated chills onset last night. Today, chills have persisted with new symptoms of nausea and 2 episodes of vomiting. She notes taking 2 tylenols this morning. Patient was seen at clinic today and was sent here for further evaluation for concern for kidney infection. Denies history of diabetes. Patient has history of atrial fibrillation and giant cell arteritis. Patient denies fever, or any additional complaints at this time.       REVIEW OF SYSTEMS     Constitutional:  Denies fever. Endorses chills  HENT:  Denies sore throat   Respiratory:  Denies cough or shortness of breath   Cardiovascular:  Denies chest pain or palpitations  GI:  Denies abdominal pain. Endorses nausea and vomiting  Musculoskeletal:  Denies any new extremity pain   Skin:  Denies rash   Neurologic:  Denies headache, focal weakness or sensory changes    All other systems reviewed and are negative      PAST MEDICAL  HISTORY:  History reviewed. No pertinent past medical history.    PAST SURGICAL HISTORY:  History reviewed. No pertinent surgical history.        CURRENT MEDICATIONS:    No current outpatient medications on file.      ALLERGIES:  Allergies   Allergen Reactions     Sulfa Drugs Other (See Comments)     Unknown       FAMILY HISTORY:  History reviewed. No pertinent family history.    SOCIAL HISTORY:   Social History     Socioeconomic History     Marital status:      Spouse name: None     Number of children: None     Years of education: None     Highest education level: None   Occupational History     None   Tobacco Use     Smoking status: None   Substance and Sexual Activity     Alcohol use: None     Drug use: None     Sexual activity: None   Other Topics Concern     None   Social History Narrative     None     Social Determinants of Health     Financial Resource Strain:      Difficulty of Paying Living Expenses:    Food Insecurity:      Worried About Running Out of Food in the Last Year:      Ran Out of Food in the Last Year:    Transportation Needs:      Lack of Transportation (Medical):      Lack of Transportation (Non-Medical):    Physical Activity:      Days of Exercise per Week:      Minutes of Exercise per Session:    Stress:      Feeling of Stress :    Social Connections:      Frequency of Communication with Friends and Family:      Frequency of Social Gatherings with Friends and Family:      Attends Yarsani Services:      Active Member of Clubs or Organizations:      Attends Club or Organization Meetings:      Marital Status:    Intimate Partner Violence:      Fear of Current or Ex-Partner:      Emotionally Abused:      Physically Abused:      Sexually Abused:        VITALS:  Patient Vitals for the past 24 hrs:   BP Temp Temp src Pulse Resp SpO2 Height Weight   09/03/21 0530 (!) 157/65 -- -- 117 14 96 % -- --   09/03/21 0520 -- -- -- 114 20 93 % -- --   09/03/21 0400 (!) 144/68 -- -- (!) 129 -- (!) 71  "% -- --   09/03/21 0345 (!) 140/64 -- -- 106 -- 97 % -- --   09/03/21 0220 130/81 -- -- -- -- -- -- --   09/02/21 1945 107/52 98.6  F (37  C) Oral 105 18 96 % 1.676 m (5' 6\") 72.6 kg (160 lb)       PHYSICAL EXAM    Constitutional:  Well developed, Well nourished,  HENT:  Normocephalic, Atraumatic, Bilateral external ears normal, Oropharynx moist, Nose normal.   Neck:  Normal range of motion, No meningismus, No stridor.   Eyes:  EOMI, Conjunctiva normal, No discharge.   Respiratory:  Normal breath sounds, No respiratory distress, No wheezing, No chest tenderness.   Cardiovascular:  tachycardic, irregularly irregular rhythm, No murmurs  GI:  Soft, mild diffuse tenderness, No guarding, No CVA tenderness.   Musculoskeletal:  Neurovascularly intact distally, No edema, No tenderness, No cyanosis, Good range of motion in all major joints. No tenderness to palpation or major deformities noted.   Integument:  Warm, Dry, No erythema, No rash.   Lymphatic:  No lymphadenopathy noted.   Neurologic:  Alert & oriented x 3, Normal motor function, Normal sensory function, No focal deficits noted.   Psychiatric:  Affect normal, Judgment normal, Mood normal.      LAB:  All pertinent labs reviewed and interpreted.  Results for orders placed or performed during the hospital encounter of 09/03/21   Abd/pelvis CT no contrast - Stone Protocol    Impression    IMPRESSION: Within the limitation of noncontrast exam, there is:  1.  No evidence of acute pathology in the abdomen or pelvis. No radiodense kidney/ureteral stones or hydronephrosis in either kidney.  2.  Colonic diverticulosis without CT evidence of acute diverticulitis.  3.  Hepatic steatosis.     UA with Microscopic reflex to Culture    Specimen: Urine, Midstream   Result Value Ref Range    Color Urine Yellow Colorless, Straw, Light Yellow, Yellow    Appearance Urine Cloudy (A) Clear    Glucose Urine Negative Negative mg/dL    Bilirubin Urine Negative Negative    Ketones Urine 5  " (A) Negative mg/dL    Specific Gravity Urine 1.025 1.003 - 1.035    Blood Urine 0.2 mg/dL (A) Negative    pH Urine 5.5 5.0 - 7.0    Protein Albumin Urine 30  (A) Negative mg/dL    Urobilinogen Urine Normal Normal, 2.0 mg/dL    Nitrite Urine Negative Negative    Leukocyte Esterase Urine 500 Emile/uL (A) Negative    Bacteria Urine Many (A) None Seen /HPF    RBC Urine 25 (H) <=2 /HPF    WBC Urine >100 (H) <=5 /HPF    Squamous Epithelials Urine 0 <=1 /HPF   Basic metabolic panel   Result Value Ref Range    Sodium 138 136 - 145 mmol/L    Potassium 4.0 3.5 - 5.0 mmol/L    Chloride 101 98 - 107 mmol/L    Carbon Dioxide (CO2) 28 22 - 31 mmol/L    Anion Gap 9 5 - 18 mmol/L    Urea Nitrogen 12 8 - 28 mg/dL    Creatinine 0.78 0.60 - 1.10 mg/dL    Calcium 9.4 8.5 - 10.5 mg/dL    Glucose 115 70 - 125 mg/dL    GFR Estimate 73 >60 mL/min/1.73m2   CBC with platelets and differential   Result Value Ref Range    WBC Count 14.2 (H) 4.0 - 11.0 10e3/uL    RBC Count 3.45 (L) 3.80 - 5.20 10e6/uL    Hemoglobin 11.5 (L) 11.7 - 15.7 g/dL    Hematocrit 36.0 35.0 - 47.0 %     (H) 78 - 100 fL    MCH 33.3 (H) 26.5 - 33.0 pg    MCHC 31.9 31.5 - 36.5 g/dL    RDW 14.8 10.0 - 15.0 %    Platelet Count 209 150 - 450 10e3/uL    % Neutrophils 79 %    % Lymphocytes 7 %    % Monocytes 13 %    % Eosinophils 0 %    % Basophils 0 %    % Immature Granulocytes 1 %    NRBCs per 100 WBC 0 <1 /100    Absolute Neutrophils 11.3 (H) 1.6 - 8.3 10e3/uL    Absolute Lymphocytes 0.9 0.8 - 5.3 10e3/uL    Absolute Monocytes 1.9 (H) 0.0 - 1.3 10e3/uL    Absolute Eosinophils 0.0 0.0 - 0.7 10e3/uL    Absolute Basophils 0.0 0.0 - 0.2 10e3/uL    Absolute Immature Granulocytes 0.1 (H) <=0.0 10e3/uL    Absolute NRBCs 0.0 10e3/uL   Lactic acid whole blood   Result Value Ref Range    Lactic Acid 1.4 0.7 - 2.0 mmol/L   Asymptomatic COVID-19 Virus (Coronavirus) by PCR Nasopharyngeal    Specimen: Nasopharyngeal; Swab   Result Value Ref Range    SARS CoV2 PCR Negative Negative    Result Value Ref Range    Troponin I <0.01 0.00 - 0.29 ng/mL       RADIOLOGY:  I have independently reviewed and interpreted the above imaging, pending the final radiology read.  Abd/pelvis CT no contrast - Stone Protocol   Final Result   IMPRESSION: Within the limitation of noncontrast exam, there is:   1.  No evidence of acute pathology in the abdomen or pelvis. No radiodense kidney/ureteral stones or hydronephrosis in either kidney.   2.  Colonic diverticulosis without CT evidence of acute diverticulitis.   3.  Hepatic steatosis.             EK-rate is 114, atrial fib flutter, no ST segment elevation or depression is appreciated    I have independently reviewed and interpreted this EKG          I, Primitivo Pal, am serving as a scribe to document services personally performed by Dr. Ordoñez based on my observation and the provider's statements to me. I, Dorina Ordoñez MD attest that Primitivo Pal is acting in a scribe capacity, has observed my performance of the services and has documented them in accordance with my direction.    Dorina Ordoñez MD  Emergency Medicine  [facility] EMERGENCY ROOM  [address] [facility] [id number] [phone]  Dept: [phone]     Dorina Ordoñez MD  21 0708

## 2021-09-03 NOTE — ED TRIAGE NOTES
Urinary symptoms x 3 weeks. Diagnosed with UTI yesterday and started antibiotics today. Just finished a different antibiotic for a bladder infections 1 week ago. Now c/o nausea, urinary frequency, chills, burning, low abd pain, right flank pain. Sent here from clinic with concerns of kidney infection

## 2021-09-04 LAB
ANION GAP SERPL CALCULATED.3IONS-SCNC: 11 MMOL/L (ref 5–18)
BACTERIA UR CULT: NORMAL
BUN SERPL-MCNC: 8 MG/DL (ref 8–28)
CALCIUM SERPL-MCNC: 8.6 MG/DL (ref 8.5–10.5)
CHLORIDE BLD-SCNC: 103 MMOL/L (ref 98–107)
CO2 SERPL-SCNC: 23 MMOL/L (ref 22–31)
CREAT SERPL-MCNC: 0.7 MG/DL (ref 0.6–1.1)
ERYTHROCYTE [DISTWIDTH] IN BLOOD BY AUTOMATED COUNT: 14.5 % (ref 10–15)
GFR SERPL CREATININE-BSD FRML MDRD: 83 ML/MIN/1.73M2
GLUCOSE BLD-MCNC: 98 MG/DL (ref 70–125)
HCT VFR BLD AUTO: 33.2 % (ref 35–47)
HGB BLD-MCNC: 10.4 G/DL (ref 11.7–15.7)
LACTATE SERPL-SCNC: 1 MMOL/L (ref 0.7–2)
MCH RBC QN AUTO: 31.7 PG (ref 26.5–33)
MCHC RBC AUTO-ENTMCNC: 31.3 G/DL (ref 31.5–36.5)
MCV RBC AUTO: 101 FL (ref 78–100)
PLATELET # BLD AUTO: 178 10E3/UL (ref 150–450)
POTASSIUM BLD-SCNC: 3.9 MMOL/L (ref 3.5–5)
RBC # BLD AUTO: 3.28 10E6/UL (ref 3.8–5.2)
SODIUM SERPL-SCNC: 137 MMOL/L (ref 136–145)
WBC # BLD AUTO: 9.5 10E3/UL (ref 4–11)

## 2021-09-04 PROCEDURE — 250N000013 HC RX MED GY IP 250 OP 250 PS 637: Performed by: FAMILY MEDICINE

## 2021-09-04 PROCEDURE — 99233 SBSQ HOSP IP/OBS HIGH 50: CPT | Performed by: FAMILY MEDICINE

## 2021-09-04 PROCEDURE — 83605 ASSAY OF LACTIC ACID: CPT | Performed by: FAMILY MEDICINE

## 2021-09-04 PROCEDURE — 36415 COLL VENOUS BLD VENIPUNCTURE: CPT | Performed by: FAMILY MEDICINE

## 2021-09-04 PROCEDURE — 120N000001 HC R&B MED SURG/OB

## 2021-09-04 PROCEDURE — 250N000011 HC RX IP 250 OP 636: Performed by: HOSPITALIST

## 2021-09-04 PROCEDURE — 258N000003 HC RX IP 258 OP 636: Performed by: FAMILY MEDICINE

## 2021-09-04 PROCEDURE — 250N000012 HC RX MED GY IP 250 OP 636 PS 637: Performed by: FAMILY MEDICINE

## 2021-09-04 PROCEDURE — 258N000003 HC RX IP 258 OP 636: Performed by: HOSPITALIST

## 2021-09-04 PROCEDURE — 80048 BASIC METABOLIC PNL TOTAL CA: CPT | Performed by: FAMILY MEDICINE

## 2021-09-04 PROCEDURE — 85027 COMPLETE CBC AUTOMATED: CPT | Performed by: FAMILY MEDICINE

## 2021-09-04 PROCEDURE — 250N000013 HC RX MED GY IP 250 OP 250 PS 637: Performed by: HOSPITALIST

## 2021-09-04 RX ADMIN — CEFTRIAXONE 1 G: 1 INJECTION, POWDER, FOR SOLUTION INTRAMUSCULAR; INTRAVENOUS at 03:58

## 2021-09-04 RX ADMIN — PREDNISONE 3 MG: 1 TABLET ORAL at 08:53

## 2021-09-04 RX ADMIN — APIXABAN 5 MG: 5 TABLET, FILM COATED ORAL at 08:53

## 2021-09-04 RX ADMIN — TRAZODONE HYDROCHLORIDE 50 MG: 50 TABLET ORAL at 22:59

## 2021-09-04 RX ADMIN — APIXABAN 5 MG: 5 TABLET, FILM COATED ORAL at 20:43

## 2021-09-04 RX ADMIN — METOPROLOL SUCCINATE 25 MG: 25 TABLET, EXTENDED RELEASE ORAL at 08:53

## 2021-09-04 RX ADMIN — SODIUM CHLORIDE, POTASSIUM CHLORIDE, SODIUM LACTATE AND CALCIUM CHLORIDE: 600; 310; 30; 20 INJECTION, SOLUTION INTRAVENOUS at 17:57

## 2021-09-04 RX ADMIN — SODIUM CHLORIDE, POTASSIUM CHLORIDE, SODIUM LACTATE AND CALCIUM CHLORIDE: 600; 310; 30; 20 INJECTION, SOLUTION INTRAVENOUS at 01:37

## 2021-09-04 RX ADMIN — PANTOPRAZOLE SODIUM 20 MG: 20 TABLET, DELAYED RELEASE ORAL at 06:52

## 2021-09-04 NOTE — PROGRESS NOTES
[facility] MEDICINE PROGRESS NOTE      Identification/Summary: Lilian La is a 81 year old female with a past medical history of chronic atrial fibrillation, on chronic anticoagulation treatment, temporal arteritis, on chronic steroid, fibromyalgia, dyslipidemia, recurrent UTI came with right-sided flank pain radiating to the groin found to have UTI.  Started on IV ceftriaxone.  Evaluated by urology.  She has multiple UTIs in the past 6 months.  Urine culture grew E. coli in 7/21, 8/21, 9/21.  Denies history of kidney stones or gross hematuria.  Recommended to start prophylactic antibiotic for 90-day after antibiotic course for this UTI.  Will start vaginal estrogen cream.  Remains hemodynamically stable.    Assessment and Plan:  Acute on recurrent UTI  Right pyelonephritis  Sepsis, resolved  Started on IV ceftriaxone 1 g daily  Urine culture is pending  Will treat antibiotic for 10 to 14 days then prophylactic antibiotic for 90-day  Vaginal estrogen cream  Appreciate urology consult    Atrial fibrillation  Heart rate is stable  Metoprolol 25 mg daily  On chronic anticoagulation treatment with Eliquis    Temporal arteritis, On chronic steroid  Giant cell arteritis  Seeing rheumatology  On methotrexate atovaquone    GERD, resume PPI    Code full  DVT prophylaxis  On chronic anticoagulation treatment  Barrier to discharge  Awaiting for more clinical improvement.  Anticipated discharge in 1 day    Interval History/Subjective:  Resting comfortably. Afebrile. Feeling much better today. Appetite is stable. No other concern.    Review of system  Rest of the review of system are negative except HPI. Denies headache, chest pain, breathing difficulty, palpitation, nausea, vomiting, abdominal pain or urinary symptoms.      Physical Exam/Objective:  Temp:  [98.1  F (36.7  C)-99.8  F (37.7  C)] 99.8  F (37.7  C)  Pulse:  [] 107  Resp:  [18-22] 18  BP: (127-155)/(69-95) 144/79  SpO2:  [92  %-98 %] 95 %    Body mass index is 25.82 kg/m .    GENERAL:  Alert, appears comfortable, in no acute distress, appears stated age   HEAD:  Normocephalic, without obvious abnormality, atraumatic   EYES:  PERRL, conjunctiva  clear,   EOM's intact   NOSE: Nares normal, septum midline,  no drainage   THROAT: Lips, mucosa,  gums normal, mouth moist   NECK: Supple, symmetrical, trachea midline   BACK:   Symmetric, no curvature, ROM normal   LUNGS:   Clear to auscultation bilaterally, no rales, rhonchi, or wheezing, symmetric chest rise on inhalation, respirations unlabored   CHEST WALL:  No tenderness or deformity   HEART:  Regular rate and rhythm, S1 and S2 normal, no murmur    ABDOMEN:   Soft, non-tender, bowel sounds active , no masses, no organomegaly    EXTREMITIES: No  edema    SKIN: No rashes   NEURO: Alert, oriented x3, moves all four extremities freely   PSYCH: Cooperative, behavior is appropriate      Medications:   Personally Reviewed.  Medications     lactated ringers 50 mL/hr at 09/04/21 0944       apixaban ANTICOAGULANT  5 mg Oral BID     cefTRIAXone  1 g Intravenous Q24H     metoprolol succinate ER  25 mg Oral Daily     pantoprazole  20 mg Oral QAM AC     predniSONE  3 mg Oral QAM     sodium chloride (PF)  3 mL Intracatheter Q8H     traZODone  50 mg Oral At Bedtime       Data reviewed today: I personally reviewed all new medications, labs, imaging/diagnostics reports over the past 24 hours. Pertinent findings include:    Imaging:   No results found for this or any previous visit (from the past 24 hour(s)).    Labs:  Most Recent 3 CBC's:  Recent Labs   Lab Test 09/04/21  0755 09/02/21  2304 03/28/19  0528 03/21/19  0857   WBC 9.5 14.2*  --   --    HGB 10.4* 11.5*  --  9.4*   * 104*  --   --     209 336 282     Most Recent 3 BMP's:  Recent Labs   Lab Test 09/04/21  0755 09/02/21  2304    138   POTASSIUM 3.9 4.0   CHLORIDE 103 101   CO2 23 28   BUN 8 12   CR 0.70 0.78   ANIONGAP 11 9   YIN  8.6 9.4   GLC 98 115       Saira Del Rio MD  [facility] Medicine  [facility]  Phone: #[phone]

## 2021-09-04 NOTE — PROGRESS NOTES
Chart assessment completed per CM protocol per readmission risk of 12%. Per chart review, patient admitted thru ED from home with recurrent UTI. Urology consulted and made recommendations for antibiotics before signing off 9/3.    Per chart review, patient lives at home and was independent with Activities of daily living (ADLs) except for transport. Anticipate family will give her a ride home.     No further needs identified. CM will follow for changes in discharge planning.

## 2021-09-04 NOTE — PLAN OF CARE
Problem: Risk for Delirium  Goal: Improved Sleep  Outcome: Improving     Problem: Pain Acute  Goal: Acceptable Pain Control and Functional Ability  Outcome: Improving     Complained of mild headache during shift, PRN Tylenol given to help alleviate and was effective. Received IV Rocephin on shift. LR running at 100. Calls appropriately. Bladder scan done on shift showed 175.

## 2021-09-04 NOTE — PLAN OF CARE
Problem: Pain Acute  Goal: Acceptable Pain Control and Functional Ability  Outcome: Improving  Problem: UTI (Urinary Tract Infection)  Goal: Improved Infection Symptoms  Outcome: Improving  Problem: Pain Acute  Goal: Acceptable Pain Control and Functional Ability  Outcome: Improving     Patient denies pain. Frequent trips to bathroom. Diarrhea this AM. However stopped and awaiting for specimen. Bladder scan was 52. IVF running. Sepsis protocol prompted lactic acid was 1.0. Iv antibiotics.

## 2021-09-05 VITALS
RESPIRATION RATE: 18 BRPM | WEIGHT: 160 LBS | HEART RATE: 105 BPM | OXYGEN SATURATION: 96 % | HEIGHT: 66 IN | TEMPERATURE: 99.3 F | BODY MASS INDEX: 25.71 KG/M2 | SYSTOLIC BLOOD PRESSURE: 157 MMHG | DIASTOLIC BLOOD PRESSURE: 87 MMHG

## 2021-09-05 PROCEDURE — 99239 HOSP IP/OBS DSCHRG MGMT >30: CPT | Performed by: FAMILY MEDICINE

## 2021-09-05 PROCEDURE — 250N000013 HC RX MED GY IP 250 OP 250 PS 637: Performed by: HOSPITALIST

## 2021-09-05 PROCEDURE — 250N000013 HC RX MED GY IP 250 OP 250 PS 637: Performed by: FAMILY MEDICINE

## 2021-09-05 PROCEDURE — 250N000011 HC RX IP 250 OP 636: Performed by: HOSPITALIST

## 2021-09-05 PROCEDURE — 250N000012 HC RX MED GY IP 250 OP 636 PS 637: Performed by: FAMILY MEDICINE

## 2021-09-05 RX ORDER — CEPHALEXIN 500 MG/1
500 CAPSULE ORAL 3 TIMES DAILY
Qty: 30 CAPSULE | Refills: 0 | Status: SHIPPED | OUTPATIENT
Start: 2021-09-05 | End: 2021-09-15

## 2021-09-05 RX ADMIN — PREDNISONE 3 MG: 1 TABLET ORAL at 08:34

## 2021-09-05 RX ADMIN — METOPROLOL SUCCINATE 25 MG: 25 TABLET, EXTENDED RELEASE ORAL at 08:34

## 2021-09-05 RX ADMIN — PANTOPRAZOLE SODIUM 20 MG: 20 TABLET, DELAYED RELEASE ORAL at 08:33

## 2021-09-05 RX ADMIN — APIXABAN 5 MG: 5 TABLET, FILM COATED ORAL at 08:34

## 2021-09-05 RX ADMIN — CEFTRIAXONE 1 G: 1 INJECTION, POWDER, FOR SOLUTION INTRAMUSCULAR; INTRAVENOUS at 04:05

## 2021-09-05 NOTE — DISCHARGE SUMMARY
[facility] MEDICINE  DISCHARGE SUMMARY     Primary Care Physician: Francisco J Vergara  Admission Date: 9/3/2021   Discharge Provider: Saira Del Rio MD Discharge Date: 9/5/2021   Diet:   Regular Code Status: Full Code   Activity: DCACTIVITY: Activity as tolerated        Condition at Discharge: Stable     REASON FOR PRESENTATION(See Admission Note for Details)   Urinary frequency    PRINCIPAL & ACTIVE DISCHARGE DIAGNOSES     Acute on recurrent UTI  Right pyelonephritis  Sepsis, resolved  Chronic atrial fibrillation  Temporal arteritis    PENDING LABS     Unresulted Labs Ordered in the Past 30 Days of this Admission     Date and Time Order Name Status Description    9/3/2021  2:13 AM Blood Culture Arm, Left Preliminary     9/3/2021  2:13 AM Blood Culture Line, venous Preliminary             PROCEDURES ( this hospitalization only)      None    RECOMMENDATIONS TO OUTPATIENT PROVIDER FOR F/U VISIT   Follow-up with primary MD in 1 week  Follow-up with urology in 4-week  Please call [phone] if you do not   hear from our office to schedule.       Location of [place] office:    [facility]   [address] [place] [address], [address]  [place] [address]  Main: [phone]         DISPOSITION     Home    SUMMARY OF HOSPITAL COURSE:      Ms.Patricia MARILOU La is a 81 year old female with a past medical history of chronic atrial fibrillation, on chronic anticoagulation treatment, temporal arteritis, on chronic steroid and immunosuppressant, fibromyalgia, dyslipidemia, recurrent UTI came with right-sided flank pain radiating to the groin found to have UTI and likely right pyelonephritis.  Sepsis is resolved.  Remains hemodynamically stable.  Received IV ceftriaxone 1 g daily.  Urine culture have mixture of urogenital organism. Evaluated by urology.  She has multiple UTIs in the past 6 months.  Urine culture grew E. coli in 7/21, 8/21, 9/21.  Will take oral cephalexin 500 mg 3 times a day for 10-day  on discharge. Denies history of kidney stones or gross hematuria.  Recommended to start prophylactic antibiotic for 90-day after antibiotic course for this UTI.  Will start vaginal estrogen cream.  Remains hemodynamically stable.    Discharge Medications with Med changes:     Current Discharge Medication List      START taking these medications    Details   conjugated estrogens (PREMARIN) 0.625 MG/GM vaginal cream . Discussed initiating fingertip application vaginally (0.5 g) nightly for 2 weeks, then 2-3 nights per week thereafter.  Qty: 30 g, Refills: 0    Associated Diagnoses: Atrophic vaginitis         CONTINUE these medications which have CHANGED    Details   !! cephALEXin (KEFLEX) 250 MG capsule Take 1 capsule (250 mg) by mouth daily (with dinner)  Qty: 30 capsule, Refills: 2    Comments: Start cephalexin 250 mg daily after antibiotic for acute UTI has been completed  Associated Diagnoses: Acute cystitis without hematuria      !! cephALEXin (KEFLEX) 500 MG capsule Take 1 capsule (500 mg) by mouth 3 times daily for 10 days  Qty: 30 capsule, Refills: 0    Comments: Will start cephalexin 250 mg daily on 9/16  Associated Diagnoses: Acute cystitis with hematuria       !! - Potential duplicate medications found. Please discuss with provider.      CONTINUE these medications which have NOT CHANGED    Details   acetaminophen (TYLENOL) 325 MG tablet Take 650 mg by mouth every 6 hours as needed for mild pain      alendronate (FOSAMAX) 70 MG tablet Take 70 mg by mouth every 7 days      apixaban ANTICOAGULANT (ELIQUIS) 5 MG tablet Take 5 mg by mouth 2 times daily      atovaquone (MEPRON) 750 MG/5ML suspension Take 1,500 mg by mouth every morning      estradiol (ESTRACE) 0.1 MG/GM vaginal cream Place 1 g vaginally twice a week INSERT 1 GRAM VAGINALLY AT BEDTIME FOR 2 WEEKS THEN CAN USE TWICE WEEKLY FOR MAINTENACE      LANsoprazole (PREVACID) 15 MG DR capsule Take 15 mg by mouth every morning      leucovorin (WELLCOVORIN) 5  MG tablet Take 5 mg by mouth every 7 days Take the day after methotrexate on Saturdays AM      methotrexate 2.5 MG tablet Take 20 mg by mouth every 7 days Take the day before leucovorin      metoprolol succinate ER (TOPROL-XL) 25 MG 24 hr tablet Take 25 mg by mouth every morning      predniSONE (DELTASONE) 1 MG tablet Take 3 mg by mouth every morning      traZODone (DESYREL) 50 MG tablet Take 50 mg by mouth At Bedtime      !! UNKNOWN TO PATIENT Take 1 chew tab by mouth daily B12      !! UNKNOWN TO PATIENT Take 2 tablets by mouth daily Glucosamine       !! - Potential duplicate medications found. Please discuss with provider.                Rationale for medication changes:      Cephalexin for 10 days then low-dose cephalexin for prophylactic treatment        Consults       UROLOGY IP CONSULT    Immunizations given this encounter     Most Recent Immunizations   Administered Date(s) Administered     COVID-Sunny,PF,Pfizer 02/19/2021           Anticoagulation Information      Eliquis 5 mg twice a day    SIGNIFICANT IMAGING FINDINGS     Results for orders placed or performed during the hospital encounter of 09/03/21   Abd/pelvis CT no contrast - Stone Protocol    Impression    IMPRESSION: Within the limitation of noncontrast exam, there is:  1.  No evidence of acute pathology in the abdomen or pelvis. No radiodense kidney/ureteral stones or hydronephrosis in either kidney.  2.  Colonic diverticulosis without CT evidence of acute diverticulitis.  3.  Hepatic steatosis.         SIGNIFICANT LABORATORY FINDINGS     WBC 14--> 9.5, hemoglobin 10.4, platelet 178  Urine culture--Mixed Urogenital Neeta     Discharge Orders        Follow Up    Email has been sent to [facility] scheduling to facilitate outpatient follow-up in ~4 weeks. Please call [phone] if you do not hear from our office to schedule.       Location of [place] office:    [facility]   [address] [place] [address], [address]  [place] [address]  Main:  "[phone]     Brief Discharge Instructions    Recurrent urinary tract infection (UTI) prevention:  -General prevention: drink plenty of fluids (48-64 ounces mostly water), void every 2-3 hours, manage bowels and avoid constipation  -Take cranberry supplement, probiotic, D-Mannose daily. Look for \"36 mg proanthocyanidins (PACs)\" on the cranberry supplement label, this is the amount of the active ingredient needed in cranberry to be effective.   -Resume use of vaginal estrogen cream (usually Premarin, Estrace, or estradiol). You should use fingerful amount (approximately 0.5 g) vaginally nightly for 2 weeks, then 2-3 nights per week thereafter. This will help to prevent UTIs in the future by changing the acidity in the vagina, making it a more inhospitable place for pathogenic bacteria to live.   -We will have you take an antibiotic daily for a few months to break the cycle of UTIs and for UTI prevention. This is due to frequent UTIs in the past several months. This will likely be a medication called cephalexin 250 mg by mouth per day.   -Please call [facility] at [phone] with any questions or concerns. We will help facilitate follow-up for you.     Activity    Your activity upon discharge: activity as tolerated     Follow-up and recommended labs and tests     Follow-up  with primary MD in 1 weekFollow-up with urology in 4 weeks     Reason for your hospital stay    Urinary symptoms     Diet    Follow this diet upon discharge: regular       Examination   Physical Exam   Temp:  [98.6  F (37  C)-99.3  F (37.4  C)] 99.3  F (37.4  C)  Pulse:  [102-115] 105  Resp:  [16-22] 18  BP: (111-157)/(56-87) 157/87  SpO2:  [95 %-98 %] 96 %  Wt Readings from Last 1 Encounters:   09/02/21 72.6 kg (160 lb)     GENERAL:  Alert, appears comfortable, in no acute distress, appears stated age   HEAD:  Normocephalic, without obvious abnormality, atraumatic   EYES:  PERRL, conjunctiva  clear,   EOM's intact   NOSE: Nares normal, " septum midline,  no drainage   THROAT: Lips, mucosa,  gums normal, mouth moist   NECK: Supple, symmetrical, trachea midline   BACK:   Symmetric, no curvature, ROM normal   LUNGS:   Clear to auscultation bilaterally, no rales, rhonchi, or wheezing, symmetric chest rise on inhalation, respirations unlabored   CHEST WALL:  No tenderness or deformity   HEART:  Regular rate and rhythm, S1 and S2 normal, no murmur    ABDOMEN:   Soft, non-tender, bowel sounds active , no masses, no organomegaly    EXTREMITIES: No  edema    SKIN: No rashes   NEURO: Alert, oriented x3, moves all four extremities freely   PSYCH: Cooperative, behavior is appropriate          Please see EMR for more detailed significant labs, imaging, consultant notes etc.    I, Saira Del Rio MD, personally saw the patient today and spent greater than 30 minutes discharging this patient.    Saira Del Rio MD  [facility]    CC:Francisco J Vergara

## 2021-09-05 NOTE — PLAN OF CARE
Problem: Adult Inpatient Plan of Care  Goal: Plan of Care Review  Outcome: Improving     VSS. NO chest pains or resp distress noted./  Continues to have urinary frequency.  Denies pain or discomfort  Continues on IV Abx of Rocephin  Continues on IVF of LRS at 50  Continues on K protocol. Bladder scanned to less than 100 within the shift.  Tolerating diet with no episodes of n/v

## 2021-09-05 NOTE — PLAN OF CARE
Patient discharging. Reviewed paperwork with patient, answered questions. Patient to schedule follow up with primary. Awaiting ride.

## 2021-09-08 LAB
BACTERIA BLD CULT: NO GROWTH
BACTERIA BLD CULT: NO GROWTH

## 2021-09-26 LAB
ATRIAL RATE - MUSE: 394 BPM
DIASTOLIC BLOOD PRESSURE - MUSE: 80 MMHG
INTERPRETATION ECG - MUSE: NORMAL
P AXIS - MUSE: NORMAL DEGREES
PR INTERVAL - MUSE: NORMAL MS
QRS DURATION - MUSE: 80 MS
QT - MUSE: 328 MS
QTC - MUSE: 452 MS
R AXIS - MUSE: 3 DEGREES
SYSTOLIC BLOOD PRESSURE - MUSE: 141 MMHG
T AXIS - MUSE: 91 DEGREES
VENTRICULAR RATE- MUSE: 114 BPM

## 2023-01-24 NOTE — PLAN OF CARE
Cleared by Hospitalist for discharge to home today. No further needs identified.    Azithromycin Pregnancy And Lactation Text: This medication is considered safe during pregnancy and is also secreted in breast milk.

## 2023-07-01 ENCOUNTER — HOSPITAL ENCOUNTER (INPATIENT)
Facility: CLINIC | Age: 83
LOS: 3 days | Discharge: HOME | DRG: 872 | End: 2023-07-06
Attending: EMERGENCY MEDICINE | Admitting: INTERNAL MEDICINE
Payer: COMMERCIAL

## 2023-07-01 DIAGNOSIS — F19.20 STEROID DEPENDENCE (H): Primary | ICD-10-CM

## 2023-07-01 DIAGNOSIS — R68.89 RIGORS: ICD-10-CM

## 2023-07-01 DIAGNOSIS — I10 BENIGN ESSENTIAL HYPERTENSION: ICD-10-CM

## 2023-07-01 DIAGNOSIS — R65.10 SIRS (SYSTEMIC INFLAMMATORY RESPONSE SYNDROME) (H): ICD-10-CM

## 2023-07-01 DIAGNOSIS — R68.83 CHILLS: ICD-10-CM

## 2023-07-01 DIAGNOSIS — N30.01 ACUTE CYSTITIS WITH HEMATURIA: ICD-10-CM

## 2023-07-01 DIAGNOSIS — R19.7 DIARRHEA, UNSPECIFIED TYPE: ICD-10-CM

## 2023-07-01 DIAGNOSIS — N30.00 ACUTE CYSTITIS WITHOUT HEMATURIA: ICD-10-CM

## 2023-07-01 LAB
ALBUMIN UR-MCNC: 10 MG/DL
ANION GAP SERPL CALCULATED.3IONS-SCNC: 12 MMOL/L (ref 5–18)
APPEARANCE UR: ABNORMAL
BACTERIA #/AREA URNS HPF: ABNORMAL /HPF
BASOPHILS # BLD AUTO: 0 10E3/UL (ref 0–0.2)
BASOPHILS NFR BLD AUTO: 0 %
BILIRUB UR QL STRIP: NEGATIVE
BUN SERPL-MCNC: 15 MG/DL (ref 8–28)
C REACTIVE PROTEIN LHE: 3.2 MG/DL (ref 0–?)
CALCIUM SERPL-MCNC: 9.1 MG/DL (ref 8.5–10.5)
CHLORIDE BLD-SCNC: 103 MMOL/L (ref 98–107)
CO2 SERPL-SCNC: 26 MMOL/L (ref 22–31)
COLOR UR AUTO: ABNORMAL
CREAT SERPL-MCNC: 0.84 MG/DL (ref 0.6–1.1)
EOSINOPHIL # BLD AUTO: 0 10E3/UL (ref 0–0.7)
EOSINOPHIL NFR BLD AUTO: 0 %
ERYTHROCYTE [DISTWIDTH] IN BLOOD BY AUTOMATED COUNT: 14.1 % (ref 10–15)
GFR SERPL CREATININE-BSD FRML MDRD: 70 ML/MIN/1.73M2
GLUCOSE BLD-MCNC: 92 MG/DL (ref 70–125)
GLUCOSE UR STRIP-MCNC: NEGATIVE MG/DL
HCT VFR BLD AUTO: 34.1 % (ref 35–47)
HGB BLD-MCNC: 10.7 G/DL (ref 11.7–15.7)
HGB UR QL STRIP: ABNORMAL
IMM GRANULOCYTES # BLD: 0.2 10E3/UL
IMM GRANULOCYTES NFR BLD: 2 %
KETONES UR STRIP-MCNC: NEGATIVE MG/DL
LEUKOCYTE ESTERASE UR QL STRIP: ABNORMAL
LYMPHOCYTES # BLD AUTO: 0.7 10E3/UL (ref 0.8–5.3)
LYMPHOCYTES NFR BLD AUTO: 6 %
MAGNESIUM SERPL-MCNC: 1.7 MG/DL (ref 1.8–2.6)
MCH RBC QN AUTO: 31.6 PG (ref 26.5–33)
MCHC RBC AUTO-ENTMCNC: 31.4 G/DL (ref 31.5–36.5)
MCV RBC AUTO: 101 FL (ref 78–100)
MONOCYTES # BLD AUTO: 0.3 10E3/UL (ref 0–1.3)
MONOCYTES NFR BLD AUTO: 2 %
MUCOUS THREADS #/AREA URNS LPF: PRESENT /LPF
NEUTROPHILS # BLD AUTO: 11.2 10E3/UL (ref 1.6–8.3)
NEUTROPHILS NFR BLD AUTO: 90 %
NITRATE UR QL: POSITIVE
NRBC # BLD AUTO: 0 10E3/UL
NRBC BLD AUTO-RTO: 0 /100
PH UR STRIP: 5.5 [PH] (ref 5–7)
PLATELET # BLD AUTO: 144 10E3/UL (ref 150–450)
POTASSIUM BLD-SCNC: 3.8 MMOL/L (ref 3.5–5)
RBC # BLD AUTO: 3.39 10E6/UL (ref 3.8–5.2)
RBC URINE: 8 /HPF
SODIUM SERPL-SCNC: 141 MMOL/L (ref 136–145)
SP GR UR STRIP: 1.01 (ref 1–1.03)
SQUAMOUS EPITHELIAL: 1 /HPF
UROBILINOGEN UR STRIP-MCNC: <2 MG/DL
WBC # BLD AUTO: 12.5 10E3/UL (ref 4–11)
WBC CLUMPS #/AREA URNS HPF: PRESENT /HPF
WBC URINE: >182 /HPF

## 2023-07-01 PROCEDURE — 85025 COMPLETE CBC W/AUTO DIFF WBC: CPT | Performed by: EMERGENCY MEDICINE

## 2023-07-01 PROCEDURE — G0378 HOSPITAL OBSERVATION PER HR: HCPCS

## 2023-07-01 PROCEDURE — 250N000011 HC RX IP 250 OP 636: Mod: JZ | Performed by: EMERGENCY MEDICINE

## 2023-07-01 PROCEDURE — 96375 TX/PRO/DX INJ NEW DRUG ADDON: CPT

## 2023-07-01 PROCEDURE — 258N000003 HC RX IP 258 OP 636: Performed by: EMERGENCY MEDICINE

## 2023-07-01 PROCEDURE — 83735 ASSAY OF MAGNESIUM: CPT | Performed by: EMERGENCY MEDICINE

## 2023-07-01 PROCEDURE — 96376 TX/PRO/DX INJ SAME DRUG ADON: CPT

## 2023-07-01 PROCEDURE — 82533 TOTAL CORTISOL: CPT | Performed by: INTERNAL MEDICINE

## 2023-07-01 PROCEDURE — 81001 URINALYSIS AUTO W/SCOPE: CPT | Performed by: EMERGENCY MEDICINE

## 2023-07-01 PROCEDURE — 84132 ASSAY OF SERUM POTASSIUM: CPT | Performed by: EMERGENCY MEDICINE

## 2023-07-01 PROCEDURE — 87086 URINE CULTURE/COLONY COUNT: CPT | Performed by: EMERGENCY MEDICINE

## 2023-07-01 PROCEDURE — 86140 C-REACTIVE PROTEIN: CPT | Performed by: EMERGENCY MEDICINE

## 2023-07-01 PROCEDURE — 87637 SARSCOV2&INF A&B&RSV AMP PRB: CPT | Performed by: EMERGENCY MEDICINE

## 2023-07-01 PROCEDURE — 84484 ASSAY OF TROPONIN QUANT: CPT | Performed by: INTERNAL MEDICINE

## 2023-07-01 PROCEDURE — 36415 COLL VENOUS BLD VENIPUNCTURE: CPT | Performed by: EMERGENCY MEDICINE

## 2023-07-01 PROCEDURE — 96365 THER/PROPH/DIAG IV INF INIT: CPT

## 2023-07-01 PROCEDURE — 82248 BILIRUBIN DIRECT: CPT | Performed by: INTERNAL MEDICINE

## 2023-07-01 PROCEDURE — 99285 EMERGENCY DEPT VISIT HI MDM: CPT | Mod: 25

## 2023-07-01 RX ORDER — ACETAMINOPHEN 325 MG/1
650 TABLET ORAL ONCE
Status: COMPLETED | OUTPATIENT
Start: 2023-07-01 | End: 2023-07-01

## 2023-07-01 RX ORDER — ONDANSETRON 4 MG/1
4 TABLET, ORALLY DISINTEGRATING ORAL EVERY 6 HOURS PRN
Status: DISCONTINUED | OUTPATIENT
Start: 2023-07-01 | End: 2023-07-06 | Stop reason: HOSPADM

## 2023-07-01 RX ORDER — ONDANSETRON 2 MG/ML
4 INJECTION INTRAMUSCULAR; INTRAVENOUS EVERY 30 MIN PRN
Status: DISCONTINUED | OUTPATIENT
Start: 2023-07-01 | End: 2023-07-01

## 2023-07-01 RX ORDER — CEFTRIAXONE 1 G/1
1 INJECTION, POWDER, FOR SOLUTION INTRAMUSCULAR; INTRAVENOUS ONCE
Status: COMPLETED | OUTPATIENT
Start: 2023-07-01 | End: 2023-07-02

## 2023-07-01 RX ORDER — ONDANSETRON 2 MG/ML
4 INJECTION INTRAMUSCULAR; INTRAVENOUS EVERY 6 HOURS PRN
Status: DISCONTINUED | OUTPATIENT
Start: 2023-07-01 | End: 2023-07-06 | Stop reason: HOSPADM

## 2023-07-01 RX ORDER — MAGNESIUM SULFATE HEPTAHYDRATE 40 MG/ML
2 INJECTION, SOLUTION INTRAVENOUS ONCE
Status: COMPLETED | OUTPATIENT
Start: 2023-07-01 | End: 2023-07-02

## 2023-07-01 RX ORDER — SODIUM CHLORIDE 9 MG/ML
INJECTION, SOLUTION INTRAVENOUS CONTINUOUS
Status: DISCONTINUED | OUTPATIENT
Start: 2023-07-02 | End: 2023-07-05

## 2023-07-01 RX ORDER — ACETAMINOPHEN 650 MG/1
650 SUPPOSITORY RECTAL EVERY 6 HOURS PRN
Status: DISCONTINUED | OUTPATIENT
Start: 2023-07-01 | End: 2023-07-06 | Stop reason: HOSPADM

## 2023-07-01 RX ORDER — METOCLOPRAMIDE HYDROCHLORIDE 5 MG/ML
5 INJECTION INTRAMUSCULAR; INTRAVENOUS ONCE
Status: DISCONTINUED | OUTPATIENT
Start: 2023-07-01 | End: 2023-07-02

## 2023-07-01 RX ORDER — HYDROMORPHONE HCL IN WATER/PF 6 MG/30 ML
0.2 PATIENT CONTROLLED ANALGESIA SYRINGE INTRAVENOUS EVERY 4 HOURS PRN
Status: DISCONTINUED | OUTPATIENT
Start: 2023-07-01 | End: 2023-07-06 | Stop reason: HOSPADM

## 2023-07-01 RX ORDER — ACETAMINOPHEN 325 MG/1
650 TABLET ORAL EVERY 4 HOURS PRN
Status: DISCONTINUED | OUTPATIENT
Start: 2023-07-01 | End: 2023-07-06 | Stop reason: HOSPADM

## 2023-07-01 RX ORDER — VANCOMYCIN HYDROCHLORIDE 125 MG/1
125 CAPSULE ORAL DAILY
Status: DISCONTINUED | OUTPATIENT
Start: 2023-07-02 | End: 2023-07-06

## 2023-07-01 RX ADMIN — ONDANSETRON 4 MG: 2 INJECTION INTRAMUSCULAR; INTRAVENOUS at 22:24

## 2023-07-01 RX ADMIN — ONDANSETRON 4 MG: 2 INJECTION INTRAMUSCULAR; INTRAVENOUS at 23:01

## 2023-07-01 RX ADMIN — SODIUM CHLORIDE 500 ML: 9 INJECTION, SOLUTION INTRAVENOUS at 23:09

## 2023-07-01 RX ADMIN — MAGNESIUM SULFATE HEPTAHYDRATE 2 G: 40 INJECTION, SOLUTION INTRAVENOUS at 23:09

## 2023-07-01 ASSESSMENT — ACTIVITIES OF DAILY LIVING (ADL): ADLS_ACUITY_SCORE: 35

## 2023-07-01 ASSESSMENT — ENCOUNTER SYMPTOMS: NERVOUS/ANXIOUS: 1

## 2023-07-02 ENCOUNTER — APPOINTMENT (OUTPATIENT)
Dept: CT IMAGING | Facility: CLINIC | Age: 83
DRG: 872 | End: 2023-07-02
Attending: INTERNAL MEDICINE
Payer: COMMERCIAL

## 2023-07-02 ENCOUNTER — APPOINTMENT (OUTPATIENT)
Dept: CT IMAGING | Facility: CLINIC | Age: 83
DRG: 872 | End: 2023-07-02
Attending: EMERGENCY MEDICINE
Payer: COMMERCIAL

## 2023-07-02 LAB
ALBUMIN SERPL-MCNC: 3.7 G/DL (ref 3.5–5)
ALBUMIN SERPL-MCNC: 3.9 G/DL (ref 3.5–5)
ALP SERPL-CCNC: 105 U/L (ref 45–120)
ALP SERPL-CCNC: 119 U/L (ref 45–120)
ALT SERPL W P-5'-P-CCNC: 41 U/L (ref 0–45)
ALT SERPL W P-5'-P-CCNC: 44 U/L (ref 0–45)
ANION GAP SERPL CALCULATED.3IONS-SCNC: 10 MMOL/L (ref 5–18)
AST SERPL W P-5'-P-CCNC: 36 U/L (ref 0–40)
AST SERPL W P-5'-P-CCNC: 39 U/L (ref 0–40)
BASOPHILS # BLD AUTO: 0 10E3/UL (ref 0–0.2)
BASOPHILS NFR BLD AUTO: 0 %
BILIRUB DIRECT SERPL-MCNC: 0.2 MG/DL
BILIRUB SERPL-MCNC: 0.8 MG/DL (ref 0–1)
BILIRUB SERPL-MCNC: 1.4 MG/DL (ref 0–1)
BUN SERPL-MCNC: 13 MG/DL (ref 8–28)
C DIFF TOX B STL QL: NEGATIVE
CALCIUM SERPL-MCNC: 8.7 MG/DL (ref 8.5–10.5)
CHLORIDE BLD-SCNC: 103 MMOL/L (ref 98–107)
CHOLEST SERPL-MCNC: 284 MG/DL
CO2 SERPL-SCNC: 25 MMOL/L (ref 22–31)
CORTIS SERPL-MCNC: 4 UG/DL
CREAT SERPL-MCNC: 0.82 MG/DL (ref 0.6–1.1)
EOSINOPHIL # BLD AUTO: 0 10E3/UL (ref 0–0.7)
EOSINOPHIL NFR BLD AUTO: 0 %
ERYTHROCYTE [DISTWIDTH] IN BLOOD BY AUTOMATED COUNT: 14.3 % (ref 10–15)
ERYTHROCYTE [SEDIMENTATION RATE] IN BLOOD BY WESTERGREN METHOD: 17 MM/HR (ref 0–30)
FLUAV RNA SPEC QL NAA+PROBE: NEGATIVE
FLUBV RNA RESP QL NAA+PROBE: NEGATIVE
GFR SERPL CREATININE-BSD FRML MDRD: 72 ML/MIN/1.73M2
GLUCOSE BLD-MCNC: 121 MG/DL (ref 70–125)
HCT VFR BLD AUTO: 34.7 % (ref 35–47)
HDLC SERPL-MCNC: 75 MG/DL
HGB BLD-MCNC: 10.8 G/DL (ref 11.7–15.7)
IMM GRANULOCYTES # BLD: 0.1 10E3/UL
IMM GRANULOCYTES NFR BLD: 1 %
LACTATE SERPL-SCNC: 2 MMOL/L (ref 0.7–2)
LACTATE SERPL-SCNC: 2 MMOL/L (ref 0.7–2)
LDLC SERPL CALC-MCNC: 188 MG/DL
LYMPHOCYTES # BLD AUTO: 0.7 10E3/UL (ref 0.8–5.3)
LYMPHOCYTES NFR BLD AUTO: 4 %
MAGNESIUM SERPL-MCNC: 2.2 MG/DL (ref 1.8–2.6)
MCH RBC QN AUTO: 31 PG (ref 26.5–33)
MCHC RBC AUTO-ENTMCNC: 31.1 G/DL (ref 31.5–36.5)
MCV RBC AUTO: 100 FL (ref 78–100)
MONOCYTES # BLD AUTO: 1.2 10E3/UL (ref 0–1.3)
MONOCYTES NFR BLD AUTO: 7 %
NEUTROPHILS # BLD AUTO: 15.7 10E3/UL (ref 1.6–8.3)
NEUTROPHILS NFR BLD AUTO: 88 %
NRBC # BLD AUTO: 0 10E3/UL
NRBC BLD AUTO-RTO: 0 /100
PLATELET # BLD AUTO: 141 10E3/UL (ref 150–450)
POTASSIUM BLD-SCNC: 4.4 MMOL/L (ref 3.5–5)
PROCALCITONIN SERPL-MCNC: 1.47 NG/ML (ref 0–0.49)
PROT SERPL-MCNC: 6.6 G/DL (ref 6–8)
PROT SERPL-MCNC: 6.7 G/DL (ref 6–8)
RBC # BLD AUTO: 3.48 10E6/UL (ref 3.8–5.2)
RSV RNA SPEC NAA+PROBE: NEGATIVE
SARS-COV-2 RNA RESP QL NAA+PROBE: NEGATIVE
SODIUM SERPL-SCNC: 138 MMOL/L (ref 136–145)
TRIGL SERPL-MCNC: 107 MG/DL
TROPONIN I SERPL-MCNC: 0.02 NG/ML (ref 0–0.29)
WBC # BLD AUTO: 17.8 10E3/UL (ref 4–11)

## 2023-07-02 PROCEDURE — 250N000011 HC RX IP 250 OP 636: Mod: JZ | Performed by: EMERGENCY MEDICINE

## 2023-07-02 PROCEDURE — 96376 TX/PRO/DX INJ SAME DRUG ADON: CPT

## 2023-07-02 PROCEDURE — 83605 ASSAY OF LACTIC ACID: CPT | Performed by: EMERGENCY MEDICINE

## 2023-07-02 PROCEDURE — 70450 CT HEAD/BRAIN W/O DYE: CPT

## 2023-07-02 PROCEDURE — 36592 COLLECT BLOOD FROM PICC: CPT | Performed by: INTERNAL MEDICINE

## 2023-07-02 PROCEDURE — 96367 TX/PROPH/DG ADDL SEQ IV INF: CPT

## 2023-07-02 PROCEDURE — 96361 HYDRATE IV INFUSION ADD-ON: CPT

## 2023-07-02 PROCEDURE — 258N000003 HC RX IP 258 OP 636: Performed by: EMERGENCY MEDICINE

## 2023-07-02 PROCEDURE — 96375 TX/PRO/DX INJ NEW DRUG ADDON: CPT

## 2023-07-02 PROCEDURE — 74176 CT ABD & PELVIS W/O CONTRAST: CPT

## 2023-07-02 PROCEDURE — 250N000013 HC RX MED GY IP 250 OP 250 PS 637: Performed by: INTERNAL MEDICINE

## 2023-07-02 PROCEDURE — 36415 COLL VENOUS BLD VENIPUNCTURE: CPT | Performed by: EMERGENCY MEDICINE

## 2023-07-02 PROCEDURE — 80061 LIPID PANEL: CPT | Performed by: EMERGENCY MEDICINE

## 2023-07-02 PROCEDURE — G0378 HOSPITAL OBSERVATION PER HR: HCPCS

## 2023-07-02 PROCEDURE — 87506 IADNA-DNA/RNA PROBE TQ 6-11: CPT | Performed by: INTERNAL MEDICINE

## 2023-07-02 PROCEDURE — 80053 COMPREHEN METABOLIC PANEL: CPT | Performed by: INTERNAL MEDICINE

## 2023-07-02 PROCEDURE — 99233 SBSQ HOSP IP/OBS HIGH 50: CPT | Performed by: EMERGENCY MEDICINE

## 2023-07-02 PROCEDURE — 258N000003 HC RX IP 258 OP 636: Performed by: INTERNAL MEDICINE

## 2023-07-02 PROCEDURE — 87077 CULTURE AEROBIC IDENTIFY: CPT | Performed by: EMERGENCY MEDICINE

## 2023-07-02 PROCEDURE — 250N000013 HC RX MED GY IP 250 OP 250 PS 637: Performed by: EMERGENCY MEDICINE

## 2023-07-02 PROCEDURE — 250N000011 HC RX IP 250 OP 636: Mod: JZ | Performed by: INTERNAL MEDICINE

## 2023-07-02 PROCEDURE — 85652 RBC SED RATE AUTOMATED: CPT | Performed by: EMERGENCY MEDICINE

## 2023-07-02 PROCEDURE — 85025 COMPLETE CBC W/AUTO DIFF WBC: CPT | Performed by: INTERNAL MEDICINE

## 2023-07-02 PROCEDURE — 87493 C DIFF AMPLIFIED PROBE: CPT | Performed by: INTERNAL MEDICINE

## 2023-07-02 PROCEDURE — 83735 ASSAY OF MAGNESIUM: CPT | Performed by: INTERNAL MEDICINE

## 2023-07-02 PROCEDURE — 36415 COLL VENOUS BLD VENIPUNCTURE: CPT | Performed by: INTERNAL MEDICINE

## 2023-07-02 PROCEDURE — 84145 PROCALCITONIN (PCT): CPT | Performed by: INTERNAL MEDICINE

## 2023-07-02 PROCEDURE — 87149 DNA/RNA DIRECT PROBE: CPT | Performed by: EMERGENCY MEDICINE

## 2023-07-02 RX ORDER — TRAZODONE HYDROCHLORIDE 50 MG/1
50 TABLET, FILM COATED ORAL AT BEDTIME
Status: DISCONTINUED | OUTPATIENT
Start: 2023-07-02 | End: 2023-07-06 | Stop reason: HOSPADM

## 2023-07-02 RX ORDER — PIPERACILLIN SODIUM, TAZOBACTAM SODIUM 3; .375 G/15ML; G/15ML
3.38 INJECTION, POWDER, LYOPHILIZED, FOR SOLUTION INTRAVENOUS ONCE
Status: COMPLETED | OUTPATIENT
Start: 2023-07-02 | End: 2023-07-02

## 2023-07-02 RX ORDER — METHOCARBAMOL 500 MG/1
500 TABLET, FILM COATED ORAL 4 TIMES DAILY PRN
Status: DISCONTINUED | OUTPATIENT
Start: 2023-07-02 | End: 2023-07-06 | Stop reason: HOSPADM

## 2023-07-02 RX ORDER — DIPHENOXYLATE HCL/ATROPINE 2.5-.025MG
1 TABLET ORAL 4 TIMES DAILY PRN
COMMUNITY
Start: 2023-03-08

## 2023-07-02 RX ORDER — UREA 10 %
500 LOTION (ML) TOPICAL EVERY MORNING
Status: DISCONTINUED | OUTPATIENT
Start: 2023-07-03 | End: 2023-07-06 | Stop reason: HOSPADM

## 2023-07-02 RX ORDER — POTASSIUM CHLORIDE 1500 MG/1
20 TABLET, EXTENDED RELEASE ORAL ONCE
Status: COMPLETED | OUTPATIENT
Start: 2023-07-02 | End: 2023-07-02

## 2023-07-02 RX ORDER — METOPROLOL SUCCINATE 25 MG/1
50 TABLET, EXTENDED RELEASE ORAL AT BEDTIME
Status: DISCONTINUED | OUTPATIENT
Start: 2023-07-02 | End: 2023-07-06 | Stop reason: HOSPADM

## 2023-07-02 RX ORDER — NALOXONE HYDROCHLORIDE 0.4 MG/ML
0.2 INJECTION, SOLUTION INTRAMUSCULAR; INTRAVENOUS; SUBCUTANEOUS
Status: DISCONTINUED | OUTPATIENT
Start: 2023-07-02 | End: 2023-07-06 | Stop reason: HOSPADM

## 2023-07-02 RX ORDER — MECLIZINE HYDROCHLORIDE 25 MG/1
25 TABLET ORAL 3 TIMES DAILY PRN
Status: DISCONTINUED | OUTPATIENT
Start: 2023-07-02 | End: 2023-07-06 | Stop reason: HOSPADM

## 2023-07-02 RX ORDER — NALOXONE HYDROCHLORIDE 0.4 MG/ML
0.4 INJECTION, SOLUTION INTRAMUSCULAR; INTRAVENOUS; SUBCUTANEOUS
Status: DISCONTINUED | OUTPATIENT
Start: 2023-07-02 | End: 2023-07-06 | Stop reason: HOSPADM

## 2023-07-02 RX ORDER — SODIUM PHOSPHATE,MONO-DIBASIC 19G-7G/118
2 ENEMA (ML) RECTAL EVERY MORNING
COMMUNITY

## 2023-07-02 RX ORDER — HYDROCODONE BITARTRATE AND ACETAMINOPHEN 5; 325 MG/1; MG/1
1 TABLET ORAL EVERY 4 HOURS PRN
Status: DISCONTINUED | OUTPATIENT
Start: 2023-07-02 | End: 2023-07-06 | Stop reason: HOSPADM

## 2023-07-02 RX ORDER — DIPHENOXYLATE HCL/ATROPINE 2.5-.025MG
1 TABLET ORAL 4 TIMES DAILY PRN
Status: DISCONTINUED | OUTPATIENT
Start: 2023-07-02 | End: 2023-07-03

## 2023-07-02 RX ORDER — PIPERACILLIN SODIUM, TAZOBACTAM SODIUM 3; .375 G/15ML; G/15ML
3.38 INJECTION, POWDER, LYOPHILIZED, FOR SOLUTION INTRAVENOUS EVERY 8 HOURS
Status: DISCONTINUED | OUTPATIENT
Start: 2023-07-02 | End: 2023-07-06

## 2023-07-02 RX ORDER — PANTOPRAZOLE SODIUM 20 MG/1
20 TABLET, DELAYED RELEASE ORAL EVERY MORNING
Status: DISCONTINUED | OUTPATIENT
Start: 2023-07-03 | End: 2023-07-06 | Stop reason: HOSPADM

## 2023-07-02 RX ORDER — GINGER ROOT/GINGER ROOT EXT 262.5 MG
1 CAPSULE ORAL DAILY
COMMUNITY

## 2023-07-02 RX ADMIN — METOPROLOL SUCCINATE 50 MG: 25 TABLET, EXTENDED RELEASE ORAL at 21:48

## 2023-07-02 RX ADMIN — SODIUM CHLORIDE 500 ML: 9 INJECTION, SOLUTION INTRAVENOUS at 11:29

## 2023-07-02 RX ADMIN — VANCOMYCIN HYDROCHLORIDE 125 MG: 125 CAPSULE ORAL at 08:53

## 2023-07-02 RX ADMIN — HYDROCORTISONE SODIUM SUCCINATE 50 MG: 100 INJECTION, POWDER, FOR SOLUTION INTRAMUSCULAR; INTRAVENOUS at 02:40

## 2023-07-02 RX ADMIN — PROCHLORPERAZINE EDISYLATE 5 MG: 5 INJECTION INTRAMUSCULAR; INTRAVENOUS at 00:02

## 2023-07-02 RX ADMIN — PROCHLORPERAZINE EDISYLATE 5 MG: 5 INJECTION INTRAMUSCULAR; INTRAVENOUS at 08:53

## 2023-07-02 RX ADMIN — HYDROCORTISONE SODIUM SUCCINATE 50 MG: 100 INJECTION, POWDER, FOR SOLUTION INTRAMUSCULAR; INTRAVENOUS at 13:08

## 2023-07-02 RX ADMIN — PIPERACILLIN AND TAZOBACTAM 3.38 G: 3; .375 INJECTION, POWDER, LYOPHILIZED, FOR SOLUTION INTRAVENOUS at 14:29

## 2023-07-02 RX ADMIN — TRAZODONE HYDROCHLORIDE 50 MG: 50 TABLET ORAL at 21:48

## 2023-07-02 RX ADMIN — PIPERACILLIN AND TAZOBACTAM 3.38 G: 3; .375 INJECTION, POWDER, LYOPHILIZED, FOR SOLUTION INTRAVENOUS at 00:56

## 2023-07-02 RX ADMIN — SODIUM CHLORIDE: 9 INJECTION, SOLUTION INTRAVENOUS at 11:39

## 2023-07-02 RX ADMIN — HYDROCODONE BITARTRATE AND ACETAMINOPHEN 1 TABLET: 5; 325 TABLET ORAL at 08:54

## 2023-07-02 RX ADMIN — POTASSIUM CHLORIDE 20 MEQ: 1500 TABLET, EXTENDED RELEASE ORAL at 02:42

## 2023-07-02 RX ADMIN — SODIUM CHLORIDE: 9 INJECTION, SOLUTION INTRAVENOUS at 14:30

## 2023-07-02 RX ADMIN — ACETAMINOPHEN 650 MG: 325 TABLET ORAL at 22:06

## 2023-07-02 RX ADMIN — PIPERACILLIN AND TAZOBACTAM 3.38 G: 3; .375 INJECTION, POWDER, LYOPHILIZED, FOR SOLUTION INTRAVENOUS at 22:00

## 2023-07-02 RX ADMIN — PIPERACILLIN AND TAZOBACTAM 3.38 G: 3; .375 INJECTION, POWDER, LYOPHILIZED, FOR SOLUTION INTRAVENOUS at 06:59

## 2023-07-02 RX ADMIN — CEFTRIAXONE 1 G: 1 INJECTION, POWDER, FOR SOLUTION INTRAMUSCULAR; INTRAVENOUS at 00:29

## 2023-07-02 RX ADMIN — VANCOMYCIN HYDROCHLORIDE 125 MG: 125 CAPSULE ORAL at 00:49

## 2023-07-02 RX ADMIN — SODIUM CHLORIDE: 9 INJECTION, SOLUTION INTRAVENOUS at 00:18

## 2023-07-02 RX ADMIN — HYDROCORTISONE SODIUM SUCCINATE 50 MG: 100 INJECTION, POWDER, FOR SOLUTION INTRAMUSCULAR; INTRAVENOUS at 21:48

## 2023-07-02 RX ADMIN — METHOCARBAMOL TABLETS 500 MG: 500 TABLET, COATED ORAL at 11:39

## 2023-07-02 RX ADMIN — APIXABAN 5 MG: 5 TABLET, FILM COATED ORAL at 21:47

## 2023-07-02 ASSESSMENT — ACTIVITIES OF DAILY LIVING (ADL)
ADLS_ACUITY_SCORE: 35

## 2023-07-02 NOTE — ED PROVIDER NOTES
NAME: Lilian La  AGE: 82 year old female  YOB: 1940  MRN: [id number]  EVALUATION DATE & TIME: 7/1/2023  9:36 PM    PCP: Francisco J Vergara    ED PROVIDER: Cade Moss M.D.      Chief Complaint   Patient presents with     Panic Attack     FINAL IMPRESSION:  1. Acute cystitis with hematuria    2. Chills    3. Rigors    4. SIRS (systemic inflammatory response syndrome) (H)      MEDICAL DECISION MAKING:    10:19 PM I met with the patient, obtained history, performed an initial exam, and discussed options and plan for diagnostics and treatment here in the ED.   Patient was clinically assessed and consented to treatment. After assessment, medical decision making and workup were discussed with the patient. The patient was agreeable to plan for testing, workup, and treatment.  Pertinent Labs & Imaging studies reviewed. (See chart for details)  11:05 PM I rechecked and updated the patient with plan to keep her in the hospital.  11:24 PM I discussed the case with hospitalist, Dr. Alves, who accepts the patient, but requested a CT scan of abdomen for nausea/vomiting.       Medical Decision Making    History:    Supplemental history from: Documented in chart, if applicable and EMS    External Record(s) reviewed: Documented in chart, if applicable.    Work Up:    Chart documentation includes differential considered and any EKGs or imaging independently interpreted by provider, where specified.    In additional to work up documented, I considered the following work up: Documented in chart, if applicable.    External consultation:    Discussion of management with another provider: Documented in chart, if applicable and Hospitalist    Complicating factors:    Care impacted by chronic illness: Anticoagulated State and Hyperlipidemia    Care affected by social determinants of health: N/A    Disposition considerations: Admit.    Lilian La is a 82 year old female who presents with panic attack.    Differential diagnosis includes but not limited to rigors, chills, urinary tract infection, sepsis, SIRS, COVID-19.  Patient recently diagnosed with urinary tract infection 3 weeks ago and started Keflex.  Her Keflex was extended for 1 week and she completed a 14-day course.  She reports symptoms seem to improve and she was doing well until this evening when she had shaking but denies seizure activity as she was alert during the entire episode.  She reports it was more chills with rigors shaking.  Patient concerning for recurrent infection.  She reports similar symptoms when she had prior urinary tract infections which she has had multiple times.  Labs and urinalysis obtained.  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  Patient had leukocytosis on her CBC and concerning for infection on my interpretation.  Additionally urinalysis interpretation consistent with positive for urinary tract infection including nitrites and leukocytosis in the urine.  Patient was started on antibiotics and I discussed allergies with patient.  Patient recently and first generation cephalosporins however per protocol will give Rocephin for urine tract infection.  Kidney function is normal and after discussion with patient and daughter she did have some nausea developing which was treated with Zofran initially but then returned.  With the worsening nausea she did have vomiting and had some abdominal pain.  Reglan was ordered and patient was plan for admission to hospitalist.  Hospitalist did see patient changed Reglan to Compazine which seemed to help.  We also discussed CT of the abdomen due to the pain but this showed similar findings to urinalysis with perinephric stranding and likely infection.  Patient will be plan for admission to observation status for pyelonephritis.    0 minutes of critical care time    MEDICATIONS GIVEN IN THE  EMERGENCY:  Medications   melatonin tablet 1 mg (has no administration in time range)   ondansetron (ZOFRAN ODT) ODT tab 4 mg (has no administration in time range)     Or   ondansetron (ZOFRAN) injection 4 mg (has no administration in time range)   sodium chloride 0.9% infusion ( Intravenous Rate/Dose Verify 7/2/23 0222)   acetaminophen (TYLENOL) tablet 650 mg (has no administration in time range)     Or   acetaminophen (TYLENOL) Suppository 650 mg (has no administration in time range)   prochlorperazine (COMPAZINE) injection 5 mg (5 mg Intravenous $Given 7/2/23 0002)   HYDROmorphone (DILAUDID) injection 0.2 mg (has no administration in time range)   vancomycin (VANCOCIN) capsule 125 mg (125 mg Oral $Given 7/2/23 0049)   piperacillin-tazobactam (ZOSYN) 3.375 g vial to attach to  mL bag (has no administration in time range)   HYDROcodone-acetaminophen (NORCO) 5-325 MG per tablet 1 tablet (has no administration in time range)   meclizine (ANTIVERT) tablet 25 mg (has no administration in time range)   hydrocortisone sodium succinate PF (solu-CORTEF) injection 50 mg (has no administration in time range)   potassium chloride ER (KLOR-CON M) CR tablet 20 mEq (has no administration in time range)   magnesium sulfate 2 g in 50 mL sterile water intermittent infusion (0 g Intravenous Stopped 7/2/23 0010)   cefTRIAXone (ROCEPHIN) 1 g vial to attach to  mL bag for ADULTS or NS 50 mL bag for PEDS (0 g Intravenous Stopped 7/2/23 0045)   0.9% sodium chloride BOLUS (0 mLs Intravenous Stopped 7/1/23 2358)   acetaminophen (TYLENOL) tablet 650 mg (650 mg Oral Not Given 7/1/23 2339)   piperacillin-tazobactam (ZOSYN) 3.375 g vial to attach to  mL bag (0 g Intravenous Stopped 7/2/23 0130)       NEW PRESCRIPTIONS STARTED AT TODAY'S ER VISIT:  New Prescriptions    No medications on file          =================================================================    HPI    Patient information was obtained from: Patient and  "EMS    Use of : N/A         Lilian La is a 82 year old female with a past medical history of hyperlipidemia, who presents with anxiety.    Per EMS, patient arrives from home with \"uncontrollable shaking\" of extremities. Patient was given Zofran en route due to complaint of nausea. The patient was recently diagnosed and treated for a UTI.    Patient reports panic attack with associated nausea. Patient denies shortness of breath, chest pain, or additional symptoms or complaints at this time.       REVIEW OF SYSTEMS   Review of Systems   Psychiatric/Behavioral: The patient is nervous/anxious.    All other systems reviewed and are negative.       PAST MEDICAL HISTORY:  History reviewed. No pertinent past medical history.    PAST SURGICAL HISTORY:  History reviewed. No pertinent surgical history.    CURRENT MEDICATIONS:      Current Facility-Administered Medications:      acetaminophen (TYLENOL) tablet 650 mg, 650 mg, Oral, Q4H PRN **OR** acetaminophen (TYLENOL) Suppository 650 mg, 650 mg, Rectal, Q6H PRN, Noe Alves MD     HYDROcodone-acetaminophen (NORCO) 5-325 MG per tablet 1 tablet, 1 tablet, Oral, Q4H PRN, Noe Alves MD     hydrocortisone sodium succinate PF (solu-CORTEF) injection 50 mg, 50 mg, Intravenous, Q12H, Noe Alves MD     HYDROmorphone (DILAUDID) injection 0.2 mg, 0.2 mg, Intravenous, Q4H PRN, Noe Alves MD     meclizine (ANTIVERT) tablet 25 mg, 25 mg, Oral, TID PRN, Noe Alves MD     melatonin tablet 1 mg, 1 mg, Oral, At Bedtime PRN, Noe Alves MD     ondansetron (ZOFRAN ODT) ODT tab 4 mg, 4 mg, Oral, Q6H PRN **OR** ondansetron (ZOFRAN) injection 4 mg, 4 mg, Intravenous, Q6H PRN, Noe Alves MD     piperacillin-tazobactam (ZOSYN) 3.375 g vial to attach to  mL bag, 3.375 g, Intravenous, Q8H, Noe Alves MD     potassium chloride ER (KLOR-CON M) CR tablet 20 mEq, 20 mEq, Oral, Once, Noe Alves MD     prochlorperazine " (COMPAZINE) injection 5 mg, 5 mg, Intravenous, Q6H PRN, Noe Alves MD, 5 mg at 07/02/23 0002     sodium chloride 0.9% infusion, , Intravenous, Continuous, Noe Alves MD, Last Rate: 100 mL/hr at 07/02/23 0222, Rate Verify at 07/02/23 0222     vancomycin (VANCOCIN) capsule 125 mg, 125 mg, Oral, Daily, Noe Alves MD, 125 mg at 07/02/23 0049    Current Outpatient Medications:      acetaminophen (TYLENOL) 325 MG tablet, Take 650 mg by mouth every 6 hours as needed for mild pain, Disp: , Rfl:      alendronate (FOSAMAX) 70 MG tablet, Take 70 mg by mouth every 7 days, Disp: , Rfl:      apixaban ANTICOAGULANT (ELIQUIS) 5 MG tablet, Take 5 mg by mouth 2 times daily, Disp: , Rfl:      atovaquone (MEPRON) 750 MG/5ML suspension, Take 1,500 mg by mouth every morning, Disp: , Rfl:      cephALEXin (KEFLEX) 250 MG capsule, Take 1 capsule (250 mg) by mouth daily (with dinner), Disp: 30 capsule, Rfl: 2     conjugated estrogens (PREMARIN) 0.625 MG/GM vaginal cream, . Discussed initiating fingertip application vaginally (0.5 g) nightly for 2 weeks, then 2-3 nights per week thereafter., Disp: 30 g, Rfl: 0     estradiol (ESTRACE) 0.1 MG/GM vaginal cream, Place 1 g vaginally twice a week INSERT 1 GRAM VAGINALLY AT BEDTIME FOR 2 WEEKS THEN CAN USE TWICE WEEKLY FOR MAINTENACE, Disp: , Rfl:      LANsoprazole (PREVACID) 15 MG DR capsule, Take 15 mg by mouth every morning, Disp: , Rfl:      leucovorin (WELLCOVORIN) 5 MG tablet, Take 5 mg by mouth every 7 days Take the day after methotrexate on Saturdays AM, Disp: , Rfl:      methotrexate 2.5 MG tablet, Take 20 mg by mouth every 7 days Take the day before leucovorin, Disp: , Rfl:      metoprolol succinate ER (TOPROL-XL) 25 MG 24 hr tablet, Take 25 mg by mouth every morning, Disp: , Rfl:      predniSONE (DELTASONE) 1 MG tablet, Take 3 mg by mouth every morning, Disp: , Rfl:      traZODone (DESYREL) 50 MG tablet, Take 50 mg by mouth At Bedtime, Disp: , Rfl:      UNKNOWN TO  "PATIENT, Take 1 chew tab by mouth daily B12, Disp: , Rfl:      UNKNOWN TO PATIENT, Take 2 tablets by mouth daily Glucosamine, Disp: , Rfl:     ALLERGIES:  Allergies   Allergen Reactions     Sulfa Antibiotics Other (See Comments)     Passed out. Occurred in childhood       FAMILY HISTORY:  History reviewed. No pertinent family history.    SOCIAL HISTORY:   Social History     Socioeconomic History     Marital status:        PHYSICAL EXAM:    Vitals: /67   Pulse 106   Temp 99.1  F (37.3  C) (Oral)   Resp 20   Ht 1.676 m (5' 6\")   Wt 63.5 kg (140 lb)   SpO2 96%   BMI 22.60 kg/m     Physical Exam  Vitals and nursing note reviewed.   Constitutional:       General: She is not in acute distress.     Appearance: Normal appearance. She is normal weight. She is not ill-appearing, toxic-appearing or diaphoretic.   HENT:      Head: Normocephalic.   Eyes:      Extraocular Movements: Extraocular movements intact.      Pupils: Pupils are equal, round, and reactive to light.   Cardiovascular:      Rate and Rhythm: Regular rhythm. Tachycardia present.      Heart sounds: Normal heart sounds.   Pulmonary:      Effort: Pulmonary effort is normal. No respiratory distress.      Breath sounds: Normal breath sounds.   Chest:      Chest wall: No tenderness.   Abdominal:      Palpations: Abdomen is soft.      Tenderness: There is no abdominal tenderness. There is no right CVA tenderness, left CVA tenderness, guarding or rebound.   Musculoskeletal:         General: Normal range of motion.      Cervical back: Normal range of motion.   Skin:     General: Skin is warm and dry.      Coloration: Skin is not pale.      Findings: No erythema.   Neurological:      General: No focal deficit present.      Mental Status: She is alert.   Psychiatric:         Behavior: Behavior normal.        LAB:  All pertinent labs reviewed and interpreted.  Labs Ordered and Resulted from Time of ED Arrival to Time of ED Departure   ROUTINE UA WITH " MICROSCOPIC REFLEX TO CULTURE - Abnormal       Result Value    Color Urine Light Yellow      Appearance Urine Turbid (*)     Glucose Urine Negative      Bilirubin Urine Negative      Ketones Urine Negative      Specific Gravity Urine 1.013      Blood Urine 0.2 mg/dL (*)     pH Urine 5.5      Protein Albumin Urine 10 (*)     Urobilinogen Urine <2.0      Nitrite Urine Positive (*)     Leukocyte Esterase Urine 500 Emile/uL (*)     Bacteria Urine Few (*)     WBC Clumps Urine Present (*)     Mucus Urine Present (*)     RBC Urine 8 (*)     WBC Urine >182 (*)     Squamous Epithelials Urine 1     CRP INFLAMMATION - Abnormal    CRP 3.2 (*)    MAGNESIUM - Abnormal    Magnesium 1.7 (*)    CBC WITH PLATELETS AND DIFFERENTIAL - Abnormal    WBC Count 12.5 (*)     RBC Count 3.39 (*)     Hemoglobin 10.7 (*)     Hematocrit 34.1 (*)      (*)     MCH 31.6      MCHC 31.4 (*)     RDW 14.1      Platelet Count 144 (*)     % Neutrophils 90      % Lymphocytes 6      % Monocytes 2      % Eosinophils 0      % Basophils 0      % Immature Granulocytes 2      NRBCs per 100 WBC 0      Absolute Neutrophils 11.2 (*)     Absolute Lymphocytes 0.7 (*)     Absolute Monocytes 0.3      Absolute Eosinophils 0.0      Absolute Basophils 0.0      Absolute Immature Granulocytes 0.2      Absolute NRBCs 0.0     PROCALCITONIN - Abnormal    Procalcitonin 1.47 (*)    BASIC METABOLIC PANEL - Normal    Sodium 141      Potassium 3.8      Chloride 103      Carbon Dioxide (CO2) 26      Anion Gap 12      Urea Nitrogen 15      Creatinine 0.84      Calcium 9.1      Glucose 92      GFR Estimate 70     LACTIC ACID WHOLE BLOOD - Normal    Lactic Acid 2.0     ERYTHROCYTE SEDIMENTATION RATE AUTO - Normal    Erythrocyte Sedimentation Rate 17     INFLUENZA A/B, RSV, & SARS-COV2 PCR - Normal    Influenza A PCR Negative      Influenza B PCR Negative      RSV PCR Negative      SARS CoV2 PCR Negative     TROPONIN I - Normal    Troponin I 0.02     HEPATIC FUNCTION PANEL - Normal     Bilirubin Total 0.8      Bilirubin Direct 0.2      Protein Total 6.7      Albumin 3.9      Alkaline Phosphatase 105      AST 36      ALT 41     COMPREHENSIVE METABOLIC PANEL   CORTISOL   URINE CULTURE   BLOOD CULTURE   BLOOD CULTURE   C. DIFFICILE TOXIN B PCR WITH REFLEX TO C. DIFFICILE ANTIGEN AND TOXINS A/B EIA   ENTERIC BACTERIA AND VIRUS PANEL BY DIALLO STOOL     RADIOLOGY:  CT Abdomen Pelvis w/o Contrast   Final Result   IMPRESSION:    1.  Mild right perinephric stranding. No bran hydronephrosis. No calcified ureteral or bladder stone. Findings could reflect an infectious or inflammatory process or the sequela of a recently passed stone. Recommend correlation with urinalysis.      2.  A single tiny left midpole nonobstructing stone.      3.  Colonic diverticulosis without convincing evidence of acute diverticulitis.         CT Head w/o Contrast   Final Result   IMPRESSION:   1.  No acute intracranial process.    2.  Age-related changes described above.           PROCEDURES:   Procedures     I, Hola Roper, am serving as a scribe to document services personally performed by Dr. Cade Moss  based on my observation and the provider's statements to me. I, Cade Moss MD attest that Hola Roper is acting in a scribe capacity, has observed my performance of the services and has documented them in accordance with my direction.    Cade Moss M.D.  Emergency Medicine  [facility] Emergency Department     Cade Moss MD  07/02/23 1785

## 2023-07-02 NOTE — ED NOTES
Bed: [place] [address]  Expected date:   Expected time:   Means of arrival: Ambulance  Comments:  [facility]  83 y/o F  Anxiety/ hyperventilating 99% on RA  - stroke  Zofran 4 mg

## 2023-07-02 NOTE — PLAN OF CARE
Problem: Plan of Care - These are the overarching goals to be used throughout the patient stay.    Goal: Optimal Comfort and Wellbeing  Outcome: Progressing   Goal Outcome Evaluation:    Alert and oriented. Denies pain. Flagged sepsis protocol at 1730, provider paged and lactic acid ordered. NS running at 100mL/hour. IV Zosyn given. Had one large, formed bowel movement. Stool sample for C-Diff rule out sent to lab. Room air. VSS. Call light within reach. Alarms locked. Bed in low position.

## 2023-07-02 NOTE — PLAN OF CARE
Problem: Plan of Care - These are the overarching goals to be used throughout the patient stay.    Goal: Optimal Comfort and Wellbeing  Outcome: Progressing  Intervention: Monitor Pain and Promote Comfort  Recent Flowsheet Documentation  Taken 7/2/2023 1145 by Mi Knowles, RN  Pain Management Interventions:    medication (see MAR)    pillow support provided    repositioned  Taken 7/2/2023 0700 by Mi Knowles, RN  Pain Management Interventions:    medication (see MAR)    quiet environment facilitated    repositioned    rest    emotional support    distraction   Goal Outcome Evaluation:  Pt reports her pain has been slightly more manageable with a muscle relaxer, stronger pain medications and repositioning.     Plan: IV antibiotics, normal saline 100 ml/hr     RN updated daughter.     Patient ambulates SBA w IV pole.     Need: stool sample     Report given [id number]

## 2023-07-02 NOTE — PROGRESS NOTES
Patient is having pain that she rates at a 2/10. Patient appears to be in more pain than that. Dr. Hassan aware and ordered meds accordingly.     Patient is continuing her IV antibiotics.     Waiting for a room to be more comfortable in.     SBA.     NS at 100ml/hr.

## 2023-07-02 NOTE — PHARMACY-ADMISSION MEDICATION HISTORY
Pharmacy Intern Admission Medication History    Admission medication history is complete. The information provided in this note is only as accurate as the sources available at the time of the update.    Medication reconciliation/reorder completed by provider prior to medication history? No    Information Source(s): Patient, Clinic Records, and CareEverywhere/SureScripts via in-person    Pertinent Information: Patient confirmed completion of cephalexin course finished on 06/23.     Changes made to PTA medication list:    Added: Orencia, Prolia, diclofenac gel, Lomotil, sertraline, and vitamin D3+calcium     Deleted: Premarin, Estrace, leucovorin, methotrexate, alendronate, and atovaquone     Changed: Tylenol to 1000mg scheduled, metoprolol to 50mg daily, and prednisone to 10mg daily     Medication Affordability:   Not including over the counter (OTC) medications, was there a time in the past 3 months when you did not take your medications as prescribed because of cost?: No    Allergies reviewed with patient and updates made in EHR: yes    Medication available for use during hospital stay: None    Medication History Completed By: Nuria Fowler 7/2/2023 8:35 AM    PTA Med List   Medication Sig Last Dose     abatacept (ORENCIA) 250 MG injection Inject 750 mg into the vein every 28 days Unknown     acetaminophen (TYLENOL) 500 MG tablet Take 1,000 mg by mouth At Bedtime 6/30/2023 at hs     apixaban ANTICOAGULANT (ELIQUIS) 5 MG tablet Take 5 mg by mouth 2 times daily 7/1/2023     Calcium Carb-Cholecalciferol 600-20 MG-MCG TABS Take 1 tablet by mouth daily 7/1/2023 at am     cyanocobalamin (VITAMIN B-12) 500 MCG SUBL sublingual tablet Place 500 mcg under the tongue every morning 7/1/2023 at am     denosumab (PROLIA) 60 MG/ML SOSY injection Inject 60 mg Subcutaneous every 6 months Unknown     diclofenac (VOLTAREN) 1 % topical gel Apply 2 g topically 4 times daily as needed Past Month     diphenoxylate-atropine (LOMOTIL)  2.5-0.025 MG tablet Take 1 tablet by mouth 4 times daily as needed Past Month at PRN     glucosamine-chondroitin 500-400 MG CAPS per capsule Take 2 capsules by mouth every morning 7/1/2023 at am     LANsoprazole (PREVACID) 15 MG DR capsule Take 15 mg by mouth every morning Unknown at am     metoprolol succinate ER (TOPROL XL) 50 MG 24 hr tablet Take 50 mg by mouth At Bedtime 7/1/2023 at hs     predniSONE (DELTASONE) 5 MG tablet Take 10 mg by mouth every morning 7/1/2023 at am     sertraline (ZOLOFT) 50 MG tablet Take 50 mg by mouth every morning 7/1/2023 at am     traZODone (DESYREL) 50 MG tablet Take 50 mg by mouth At Bedtime 7/1/2023 at hs

## 2023-07-02 NOTE — H&P
[facility] MEDICINE ADMISSION HISTORY AND PHYSICAL       Assessment & Plan      1. UTI - s/p recent cephalexin, history of recurrent UTI -- on chronic prednisone for Giant cell arteritis, mildy tachycardic, r/o sepsis    - She was given Rocephin and had a recent cephalexin course, and with recurrent UTI - likely has organism not covered by cephalosporins - may need Zosyn for broader coverage and pseudomonal risk specially with recent antibiotic use and also on chronic steroids.     - IVF, pain meds, follow up culture  - CBC/procalcitonin  - will do abdominal imaging    - given Jan 2022  Cdiff (+) -- will give daily oral vanco for prophy   - tele     2. Mid and epigastric pain with N/V and hypomag. She also has diarrhea   - NPO, IVF, pain meds anti emetics, CT abdomen   - E protocol  - check C diff, stool culture    3. On Chronic prednisone for GCA, on orencia as well, on hold recently given infection, she has been having headaches  - PTA   - check cortisol, may need stress dose - solucortef 50 q12  - check head CT - given headaches and on eliquis   - neuro checks     4. HLD  - PTA meds    5. On eliquis for history of Afib     630A -- procalcitonin 1.4      VTE prophylaxis --  On eliquis  Diet --  NPO  Code Status -- Full   Barriers to discharge -- admitting clinical condition  Discharge Disposition and goals --  Unable to determine at this point, pending clinical progress and response to treatment. Patient may need transfer to SNF or ACR if unsafe to go home and needed treatment inappropriate at home setting OR may need home health care evaluation if care can be delivered at home settings. Consider referral to care manager/    PPE - I was wearing PPE when I met the patient including but not limited to - N95 mask, Gloves, and/or Safety glasses.  Status -- Obs    Care plan was created based on available information and patient's condition at the time of encounter. This was  discussed with the patient and/or family members using layman's terms, including counseling/education and they have agreed to proceed. I recommend to revise care plan and to review history if there is change in condition and/or new clinical information that is not available during my encounter. At the end of night shift, this case will be presented to the [facility] Hospitalist.         Antonio Alves MD, MPH, FACP, [facility]  Internal Medicine - Hospitalist        Chief Complaint Dysuria      HISTORY     - I met the patient in ED - 4. Met her daughter too. Looked ill.     - She was treated recently for UTI with cephalexin (June 16, urgent care). Got better and now symptomatic again with dysuria/burning.  She had shaking chills today no fevers, and had abdominal pain - mid and epigastric causing her to feel nauseoaus and vomited. No chest pain or SOB. .     - Also reported diarrhea, she relates to IBS but had  Cdiff infection in Jan 2022    - She also has headaches and dizziness for several days, noted reported June 28. She is on chronic prednisone and orencia for her GCA - and had not had missed her orencia dose given UTI. She sees Rheumatology.     - ED course - IV rocephin given. WBC 12K, CT abdomen, cultures.     - ROS --- o weakness. No CP or SOB. No palpitations. No bleeding symptoms. No weight loss. Rest of 12 point ROS was reviewed and negative.       Past Medical History     History reviewed. No pertinent past medical history.      Surgical History     History reviewed. No pertinent surgical history.     Family History      History reviewed. No pertinent family history.      Social History      .  Social History     Socioeconomic History     Marital status:      Spouse name: Not on file     Number of children: Not on file     Years of education: Not on file     Highest education level: Not on file   Occupational History     Not on file   Tobacco Use     Smoking status: Not on file     Smokeless tobacco:  Not on file   Substance and Sexual Activity     Alcohol use: Not on file     Drug use: Not on file     Sexual activity: Not on file   Other Topics Concern     Not on file   Social History Narrative     Not on file     Social Determinants of Health     Financial Resource Strain: Not on file   Food Insecurity: Not on file   Transportation Needs: Not on file   Physical Activity: Not on file   Stress: Not on file   Social Connections: Not on file   Intimate Partner Violence: Not on file   Housing Stability: Not on file          Allergies        Allergies   Allergen Reactions     Sulfa Antibiotics Other (See Comments)     Passed out. Occurred in childhood         Prior to Admission Medications      No current facility-administered medications on file prior to encounter.  acetaminophen (TYLENOL) 325 MG tablet, Take 650 mg by mouth every 6 hours as needed for mild pain  alendronate (FOSAMAX) 70 MG tablet, Take 70 mg by mouth every 7 days  apixaban ANTICOAGULANT (ELIQUIS) 5 MG tablet, Take 5 mg by mouth 2 times daily  atovaquone (MEPRON) 750 MG/5ML suspension, Take 1,500 mg by mouth every morning  cephALEXin (KEFLEX) 250 MG capsule, Take 1 capsule (250 mg) by mouth daily (with dinner)  conjugated estrogens (PREMARIN) 0.625 MG/GM vaginal cream, . Discussed initiating fingertip application vaginally (0.5 g) nightly for 2 weeks, then 2-3 nights per week thereafter.  estradiol (ESTRACE) 0.1 MG/GM vaginal cream, Place 1 g vaginally twice a week INSERT 1 GRAM VAGINALLY AT BEDTIME FOR 2 WEEKS THEN CAN USE TWICE WEEKLY FOR MAINTENACE  LANsoprazole (PREVACID) 15 MG DR capsule, Take 15 mg by mouth every morning  leucovorin (WELLCOVORIN) 5 MG tablet, Take 5 mg by mouth every 7 days Take the day after methotrexate on Saturdays AM  methotrexate 2.5 MG tablet, Take 20 mg by mouth every 7 days Take the day before leucovorin  metoprolol succinate ER (TOPROL-XL) 25 MG 24 hr tablet, Take 25 mg by mouth every morning  predniSONE (DELTASONE) 1  "MG tablet, Take 3 mg by mouth every morning  traZODone (DESYREL) 50 MG tablet, Take 50 mg by mouth At Bedtime  UNKNOWN TO PATIENT, Take 1 chew tab by mouth daily B12  UNKNOWN TO PATIENT, Take 2 tablets by mouth daily Glucosamine            Review of Systems     A 12 point comprehensive review of systems was negative except as noted above in HPI.    PHYSICAL EXAMINATION       Vitals      Vitals: BP (!) 149/82   Pulse 104   Temp 98.5  F (36.9  C) (Oral)   Resp 20   Ht 1.676 m (5' 6\")   Wt 63.5 kg (140 lb)   SpO2 96%   BMI 22.60 kg/m    BMI= Body mass index is 22.6 kg/m .      Examination     General Appearance:  Alert, cooperative, no distress  Head:    Normocephalic, without obvious abnormality, atraumatic  EENT:  PERRL, conjunctiva/corneas clear, EOM's intact.   Neck:   Supple, symmetrical, trachea midline, no adenopathy; no NVE  Back:  Symmetric, no curvature, no CVA tenderness  Chest/Lungs: Clear to auscultation bilaterally, respirations unlabored, No tenderness or deformity. No abdominal breathing or use of accessory muscles.   Heart:    Regular rate and rhythm, S1 and S2 normal, no murmur, rub   or gallop  Abdomen: (+) epigastric pain, (+) NABS, not peritoneal on palpation. Not distended  Extremities:  Extremities normal, atraumatic, no swelling   Skin:  Skin color, texture, turgor normal, no rashes or lesion  Neurologic:  Awake and alert, No lateralizing or localizing signs        Pertinent Lab     Results for orders placed or performed during the hospital encounter of 07/01/23   Basic metabolic panel   Result Value Ref Range    Sodium 141 136 - 145 mmol/L    Potassium 3.8 3.5 - 5.0 mmol/L    Chloride 103 98 - 107 mmol/L    Carbon Dioxide (CO2) 26 22 - 31 mmol/L    Anion Gap 12 5 - 18 mmol/L    Urea Nitrogen 15 8 - 28 mg/dL    Creatinine 0.84 0.60 - 1.10 mg/dL    Calcium 9.1 8.5 - 10.5 mg/dL    Glucose 92 70 - 125 mg/dL    GFR Estimate 70 >60 mL/min/1.73m2   UA with Microscopic reflex to Culture    " Specimen: Urine, Midstream   Result Value Ref Range    Color Urine Light Yellow Colorless, Straw, Light Yellow, Yellow    Appearance Urine Turbid (A) Clear    Glucose Urine Negative Negative mg/dL    Bilirubin Urine Negative Negative    Ketones Urine Negative Negative mg/dL    Specific Gravity Urine 1.013 1.001 - 1.030    Blood Urine 0.2 mg/dL (A) Negative    pH Urine 5.5 5.0 - 7.0    Protein Albumin Urine 10 (A) Negative mg/dL    Urobilinogen Urine <2.0 <2.0 mg/dL    Nitrite Urine Positive (A) Negative    Leukocyte Esterase Urine 500 Emile/uL (A) Negative    Bacteria Urine Few (A) None Seen /HPF    WBC Clumps Urine Present (A) None Seen /HPF    Mucus Urine Present (A) None Seen /LPF    RBC Urine 8 (H) <=2 /HPF    WBC Urine >182 (H) <=5 /HPF    Squamous Epithelials Urine 1 <=1 /HPF   CRP inflammation   Result Value Ref Range    CRP 3.2 (H) 0.0 - <0.8 mg/dL   Result Value Ref Range    Magnesium 1.7 (L) 1.8 - 2.6 mg/dL   CBC with platelets and differential   Result Value Ref Range    WBC Count 12.5 (H) 4.0 - 11.0 10e3/uL    RBC Count 3.39 (L) 3.80 - 5.20 10e6/uL    Hemoglobin 10.7 (L) 11.7 - 15.7 g/dL    Hematocrit 34.1 (L) 35.0 - 47.0 %     (H) 78 - 100 fL    MCH 31.6 26.5 - 33.0 pg    MCHC 31.4 (L) 31.5 - 36.5 g/dL    RDW 14.1 10.0 - 15.0 %    Platelet Count 144 (L) 150 - 450 10e3/uL    % Neutrophils 90 %    % Lymphocytes 6 %    % Monocytes 2 %    % Eosinophils 0 %    % Basophils 0 %    % Immature Granulocytes 2 %    NRBCs per 100 WBC 0 <1 /100    Absolute Neutrophils 11.2 (H) 1.6 - 8.3 10e3/uL    Absolute Lymphocytes 0.7 (L) 0.8 - 5.3 10e3/uL    Absolute Monocytes 0.3 0.0 - 1.3 10e3/uL    Absolute Eosinophils 0.0 0.0 - 0.7 10e3/uL    Absolute Basophils 0.0 0.0 - 0.2 10e3/uL    Absolute Immature Granulocytes 0.2 <=0.4 10e3/uL    Absolute NRBCs 0.0 10e3/uL           Pertinent Radiology

## 2023-07-02 NOTE — ED TRIAGE NOTES
"Patient arrived via EMS with chief complaint of panic attack. EMS described it as \"uncontrollable shaking\" of extremities. Hypertensive upon arrival, otherwise VS WDL. Zofran given by EMS for complaint of nausea. Pt denied SOB and chest pain. Was recently diagnosed and treated for a UTI.      Triage Assessment     Row Name 07/01/23 5718       Triage Assessment (Adult)    Airway WDL WDL       Respiratory WDL    Respiratory WDL WDL       Skin Circulation/Temperature WDL    Skin Circulation/Temperature WDL WDL       Cardiac WDL    Cardiac WDL WDL       Peripheral/Neurovascular WDL    Peripheral Neurovascular WDL WDL       Cognitive/Neuro/Behavioral WDL    Cognitive/Neuro/Behavioral WDL WDL              "

## 2023-07-02 NOTE — PROGRESS NOTES
Pt alert and oriented. Some abdominal discomfort but declines pain medication. Non-pharm interventions utilized. Nausea subsided after PRN compazine. 2L O2 overnight to maintain sats. A fib on tele. IV abx hung, NS running at 100 mL/hr. Continue plan of care.

## 2023-07-02 NOTE — ED NOTES
Placed on the cardiac monitor, in A.fib. Pt's nausea has subsided following Compazine administration. Placed on 2 L of O2 via NC to maintain sats > 90%. Has minimal abdominal discomfort but declines pain medications.

## 2023-07-02 NOTE — PROGRESS NOTES
[facility] MEDICINE PROGRESS NOTE      Summary: 82 year old female into [facility] [place] on 7/1/2023 after presenting for nausea,  Vomiting.  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 She was  Treated on 6/16 with keflex for a UTI.      ER diagnostics show a positive urinalysis that has been sent for culture.      Since admission she has been started on empiric antibiotics of zosyn along with  Hydrocortisone and ivf.  On my interview she feels improved though looks volume  Depleted via her mucous membranes.         Problem list:    1.  Acute pyelonephritis/UTI: in setting of UTI twice in June without a culture   And immunocompromised state; continue iv zosyn until urine   Culture results; add 500 ml ivf bolus  2.  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  Diarrhea due to acute infection: history of c diff, on oral vancomycin for    c diff prophylaxis; c diff toxin pending  5. History of GCA: she missed an infusion in June due to UTI; will resume outpatient   Infusion at discharge   6.  Leukocytosis: due to acute infection and steroid dosing        Keyona Hassan MD  [facility] Medicine  [facility]  Phone: #[phone]  Securely message with the Vocera Web Console (learn more here)  Text page via [employer] Paging/Directory     Interval History/Subjective: just had compazine for headache, nausea, feels improved    Physical Exam/Objective:  Temp:  [98.5  F (36.9  C)-99.1  F (37.3  C)] 99.1  F (37.3  C)  Pulse:  [103-119] 103  Resp:  [20-27] 20  BP: (131-186)/(67-96) 138/86  SpO2:  [93 %-98 %] 96 %  Body mass index is 22.6 kg/m .    GENERAL:  Alert, appears comfortable, in no acute distress, appears stated age   HEAD:   Normocephalic, without obvious abnormality, atraumatic   EYES:  PERRL, conjunctiva/corneas clear, no scleral icterus, EOM's intact   NOSE: Nares normal, septum midline, mucosa normal, no drainage   THROAT: Dry tongue;    NECK: Supple, symmetrical, trachea midline   BACK:   Symmetric, no curvature, ROM normal   LUNGS:   Clear to auscultation bilaterally, no rales, rhonchi, or wheezing, symmetric chest rise on inhalation, respirations unlabored   CHEST WALL:  No tenderness or deformity   HEART:  Regular rate and rhythm, S1 and S2 normal, no murmur, rub, or gallop    ABDOMEN:   Soft, non-tender, bowel sounds active all four quadrants, no masses, no organomegaly, no rebound or guarding   EXTREMITIES: Extremities normal, atraumatic, no cyanosis or edema    SKIN: Dry to touch, no exanthems in the visualized areas   NEURO: Alert, oriented x3, moves all four extremities freely   PSYCH: Cooperative, behavior is appropriate      Data reviewed today: I personally reviewed all new medications, labs, imaging/diagnostics reports over the past 24 hours. Pertinent findings include:    Imaging:   Recent Results (from the past 24 hour(s))   CT Head w/o Contrast    Narrative    EXAM: CT HEAD W/O CONTRAST  LOCATION: [facility]  DATE: 7/2/2023    INDICATION: headaches dizziness on eliquis; Headache; Existing HA disorder with clinical progression   COMPARISON: None.  TECHNIQUE: Routine CT Head without IV contrast. Multiplanar reformats. Dose reduction techniques were used.    FINDINGS:  INTRACRANIAL CONTENTS: No intracranial hemorrhage, extraaxial collection, or mass effect.  No CT evidence of acute infarct. Mild presumed chronic small vessel ischemic changes. Normal ventricles and sulci.     VISUALIZED ORBITS/SINUSES/MASTOIDS: No intraorbital abnormality. No paranasal sinus mucosal disease. No middle ear or mastoid effusion.    BONES/SOFT TISSUES: No acute abnormality.      Impression    IMPRESSION:  1.  No  acute intracranial process.   2.  Age-related changes described above.     CT Abdomen Pelvis w/o Contrast    Narrative    EXAM: CT ABDOMEN PELVIS W/O CONTRAST  LOCATION: [facility]  DATE: 7/2/2023    INDICATION: abd pain vomiting.  COMPARISON: CT from 09/03/2021.  TECHNIQUE: CT scan of the abdomen and pelvis was performed without IV contrast. Multiplanar reformats were obtained. Dose reduction techniques were used.  CONTRAST: None.    FINDINGS:   LOWER CHEST: Coronary artery and aortic valve leaflet calcification. Mild patchy infiltrate in the right lower lobe on axial image 1.    HEPATOBILIARY: Absent gallbladder. Liver within normal limits.    PANCREAS: Normal.    SPLEEN: Normal.    ADRENAL GLANDS: Normal.    KIDNEYS/BLADDER: There is a 1 mm nonobstructing left midpole stone on image 83 of series 3. There is mild right perinephric stranding. No bran hydronephrosis. No calcified ureteral stone. No calcified bladder stone. A few small bladder wall diverticula   or trabecula.    BOWEL: Colonic diverticulosis.    LYMPH NODES: Normal.    VASCULATURE: Aortoiliac atherosclerotic calcification without aneurysm.    PELVIC ORGANS: Normal.    MUSCULOSKELETAL: Prior right hip fracture repair. Lumbar spine degenerative disc and facet change.      Impression    IMPRESSION:   1.  Mild right perinephric stranding. No bran hydronephrosis. No calcified ureteral or bladder stone. Findings could reflect an infectious or inflammatory process or the sequela of a recently passed stone. Recommend correlation with urinalysis.    2.  A single tiny left midpole nonobstructing stone.    3.  Colonic diverticulosis without convincing evidence of acute diverticulitis.         Labs:  Most Recent 3 BMP's:Recent Labs   Lab Test 07/02/23  0618 07/01/23  2219 09/04/21  0755    141 137   POTASSIUM 4.4 3.8 3.9   CHLORIDE 103 103 103   CO2 25 26 23   BUN 13 15 8   CR 0.82 0.84 0.70   ANIONGAP 10 12 11   YIN 8.7 9.1 8.6     92 98       Medications:   Personally Reviewed.  Medications     sodium chloride 100 mL/hr at 07/02/23 0619       hydrocortisone sodium succinate PF  50 mg Intravenous Q12H     piperacillin-tazobactam  3.375 g Intravenous Q8H     vancomycin  125 mg Oral Daily

## 2023-07-03 LAB
ACINETOBACTER SPECIES: NOT DETECTED
ANION GAP SERPL CALCULATED.3IONS-SCNC: 8 MMOL/L (ref 5–18)
BACTERIA UR CULT: ABNORMAL
BUN SERPL-MCNC: 13 MG/DL (ref 8–28)
C COLI+JEJUNI+LARI FUSA STL QL NAA+PROBE: NOT DETECTED
CALCIUM SERPL-MCNC: 8.1 MG/DL (ref 8.5–10.5)
CHLORIDE BLD-SCNC: 106 MMOL/L (ref 98–107)
CITROBACTER SPECIES: NOT DETECTED
CO2 SERPL-SCNC: 25 MMOL/L (ref 22–31)
CREAT SERPL-MCNC: 0.73 MG/DL (ref 0.6–1.1)
CTX-M: NOT DETECTED
EC STX1 GENE STL QL NAA+PROBE: NOT DETECTED
EC STX2 GENE STL QL NAA+PROBE: NOT DETECTED
ENTEROBACTER SPECIES: NOT DETECTED
ERYTHROCYTE [DISTWIDTH] IN BLOOD BY AUTOMATED COUNT: 14 % (ref 10–15)
ESCHERICHIA COLI: DETECTED
GFR SERPL CREATININE-BSD FRML MDRD: 83 ML/MIN/1.73M2
GLUCOSE BLD-MCNC: 117 MG/DL (ref 70–125)
HCT VFR BLD AUTO: 35.2 % (ref 35–47)
HGB BLD-MCNC: 10.7 G/DL (ref 11.7–15.7)
IMP: NOT DETECTED
KLEBSIELLA OXYTOCA: NOT DETECTED
KLEBSIELLA PNEUMONIAE: NOT DETECTED
KPC: NOT DETECTED
MAGNESIUM SERPL-MCNC: 2 MG/DL (ref 1.8–2.6)
MCH RBC QN AUTO: 31.2 PG (ref 26.5–33)
MCHC RBC AUTO-ENTMCNC: 30.4 G/DL (ref 31.5–36.5)
MCV RBC AUTO: 103 FL (ref 78–100)
NDM: NOT DETECTED
NOROV GI+II ORF1-ORF2 JNC STL QL NAA+PR: NOT DETECTED
OXA (DETECTED/NOT DETECTED): NOT DETECTED
PLATELET # BLD AUTO: 125 10E3/UL (ref 150–450)
POTASSIUM BLD-SCNC: 3.9 MMOL/L (ref 3.5–5)
PROTEUS SPECIES: NOT DETECTED
PSEUDOMONAS AERUGINOSA: NOT DETECTED
RBC # BLD AUTO: 3.43 10E6/UL (ref 3.8–5.2)
RVA NSP5 STL QL NAA+PROBE: NOT DETECTED
SALMONELLA SP RPOD STL QL NAA+PROBE: NOT DETECTED
SHIGELLA SP+EIEC IPAH STL QL NAA+PROBE: NOT DETECTED
SODIUM SERPL-SCNC: 139 MMOL/L (ref 136–145)
V CHOL+PARA RFBL+TRKH+TNAA STL QL NAA+PR: NOT DETECTED
VIM: NOT DETECTED
WBC # BLD AUTO: 12.8 10E3/UL (ref 4–11)
Y ENTERO RECN STL QL NAA+PROBE: NOT DETECTED

## 2023-07-03 PROCEDURE — 250N000011 HC RX IP 250 OP 636: Mod: JZ | Performed by: EMERGENCY MEDICINE

## 2023-07-03 PROCEDURE — 120N000001 HC R&B MED SURG/OB

## 2023-07-03 PROCEDURE — 258N000003 HC RX IP 258 OP 636: Performed by: INTERNAL MEDICINE

## 2023-07-03 PROCEDURE — 96376 TX/PRO/DX INJ SAME DRUG ADON: CPT

## 2023-07-03 PROCEDURE — G0378 HOSPITAL OBSERVATION PER HR: HCPCS

## 2023-07-03 PROCEDURE — 87040 BLOOD CULTURE FOR BACTERIA: CPT | Performed by: EMERGENCY MEDICINE

## 2023-07-03 PROCEDURE — 250N000013 HC RX MED GY IP 250 OP 250 PS 637: Performed by: EMERGENCY MEDICINE

## 2023-07-03 PROCEDURE — 85027 COMPLETE CBC AUTOMATED: CPT | Performed by: EMERGENCY MEDICINE

## 2023-07-03 PROCEDURE — 36415 COLL VENOUS BLD VENIPUNCTURE: CPT | Performed by: EMERGENCY MEDICINE

## 2023-07-03 PROCEDURE — 250N000011 HC RX IP 250 OP 636: Mod: JZ | Performed by: INTERNAL MEDICINE

## 2023-07-03 PROCEDURE — 99233 SBSQ HOSP IP/OBS HIGH 50: CPT | Performed by: EMERGENCY MEDICINE

## 2023-07-03 PROCEDURE — 83735 ASSAY OF MAGNESIUM: CPT | Performed by: EMERGENCY MEDICINE

## 2023-07-03 PROCEDURE — 80048 BASIC METABOLIC PNL TOTAL CA: CPT | Performed by: EMERGENCY MEDICINE

## 2023-07-03 PROCEDURE — 250N000013 HC RX MED GY IP 250 OP 250 PS 637: Performed by: INTERNAL MEDICINE

## 2023-07-03 PROCEDURE — 96361 HYDRATE IV INFUSION ADD-ON: CPT

## 2023-07-03 RX ORDER — LOPERAMIDE HCL 2 MG
2 CAPSULE ORAL 4 TIMES DAILY PRN
Status: DISCONTINUED | OUTPATIENT
Start: 2023-07-03 | End: 2023-07-06 | Stop reason: HOSPADM

## 2023-07-03 RX ORDER — MAGNESIUM SULFATE HEPTAHYDRATE 40 MG/ML
2 INJECTION, SOLUTION INTRAVENOUS ONCE
Status: COMPLETED | OUTPATIENT
Start: 2023-07-03 | End: 2023-07-03

## 2023-07-03 RX ORDER — DIPHENOXYLATE HCL/ATROPINE 2.5-.025MG
1 TABLET ORAL 3 TIMES DAILY
Status: DISCONTINUED | OUTPATIENT
Start: 2023-07-03 | End: 2023-07-06 | Stop reason: HOSPADM

## 2023-07-03 RX ADMIN — SERTRALINE HYDROCHLORIDE 50 MG: 50 TABLET, FILM COATED ORAL at 06:30

## 2023-07-03 RX ADMIN — VANCOMYCIN HYDROCHLORIDE 125 MG: 125 CAPSULE ORAL at 08:26

## 2023-07-03 RX ADMIN — PIPERACILLIN AND TAZOBACTAM 3.38 G: 3; .375 INJECTION, POWDER, LYOPHILIZED, FOR SOLUTION INTRAVENOUS at 22:02

## 2023-07-03 RX ADMIN — TRAZODONE HYDROCHLORIDE 50 MG: 50 TABLET ORAL at 21:56

## 2023-07-03 RX ADMIN — PIPERACILLIN AND TAZOBACTAM 3.38 G: 3; .375 INJECTION, POWDER, LYOPHILIZED, FOR SOLUTION INTRAVENOUS at 06:30

## 2023-07-03 RX ADMIN — HYDROCORTISONE SODIUM SUCCINATE 50 MG: 100 INJECTION, POWDER, FOR SOLUTION INTRAMUSCULAR; INTRAVENOUS at 15:26

## 2023-07-03 RX ADMIN — METOPROLOL SUCCINATE 50 MG: 25 TABLET, EXTENDED RELEASE ORAL at 21:56

## 2023-07-03 RX ADMIN — HYDROCORTISONE SODIUM SUCCINATE 50 MG: 100 INJECTION, POWDER, FOR SOLUTION INTRAMUSCULAR; INTRAVENOUS at 05:19

## 2023-07-03 RX ADMIN — LOPERAMIDE HYDROCHLORIDE 2 MG: 2 CAPSULE ORAL at 10:51

## 2023-07-03 RX ADMIN — SODIUM CHLORIDE: 9 INJECTION, SOLUTION INTRAVENOUS at 12:13

## 2023-07-03 RX ADMIN — APIXABAN 5 MG: 5 TABLET, FILM COATED ORAL at 21:56

## 2023-07-03 RX ADMIN — DIPHENOXYLATE HYDROCHLORIDE AND ATROPINE SULFATE 1 TABLET: 2.5; .025 TABLET ORAL at 15:26

## 2023-07-03 RX ADMIN — DIPHENOXYLATE HYDROCHLORIDE AND ATROPINE SULFATE 1 TABLET: 2.5; .025 TABLET ORAL at 06:33

## 2023-07-03 RX ADMIN — DIPHENOXYLATE HYDROCHLORIDE AND ATROPINE SULFATE 1 TABLET: 2.5; .025 TABLET ORAL at 21:56

## 2023-07-03 RX ADMIN — CYANOCOBALAMIN TAB 500 MCG 500 MCG: 500 TAB at 06:30

## 2023-07-03 RX ADMIN — PANTOPRAZOLE SODIUM 20 MG: 20 TABLET, DELAYED RELEASE ORAL at 06:30

## 2023-07-03 RX ADMIN — ACETAMINOPHEN 650 MG: 325 TABLET ORAL at 22:16

## 2023-07-03 RX ADMIN — PIPERACILLIN AND TAZOBACTAM 3.38 G: 3; .375 INJECTION, POWDER, LYOPHILIZED, FOR SOLUTION INTRAVENOUS at 15:26

## 2023-07-03 RX ADMIN — HYDROCORTISONE SODIUM SUCCINATE 50 MG: 100 INJECTION, POWDER, FOR SOLUTION INTRAMUSCULAR; INTRAVENOUS at 21:55

## 2023-07-03 RX ADMIN — APIXABAN 5 MG: 5 TABLET, FILM COATED ORAL at 08:26

## 2023-07-03 RX ADMIN — SODIUM CHLORIDE: 9 INJECTION, SOLUTION INTRAVENOUS at 00:10

## 2023-07-03 RX ADMIN — MAGNESIUM SULFATE HEPTAHYDRATE 2 G: 40 INJECTION, SOLUTION INTRAVENOUS at 10:51

## 2023-07-03 ASSESSMENT — ACTIVITIES OF DAILY LIVING (ADL)
ADLS_ACUITY_SCORE: 35

## 2023-07-03 NOTE — UTILIZATION REVIEW
Admission Status; Secondary Review Determination     Under the authority of the Utilization Management Committee, the utilization review process indicated a secondary review on the above patient. The review outcome is based on review of the medical records, discussions with staff, and applying clinical experience noted on the date of the review.     (x) Inpatient Status Appropriate - This patient's medical care is consistent with medical management for inpatient care and reasonable inpatient medical practice.     RATIONALE FOR DETERMINATION     Ms. La is a 83 yo female with h/o chronic steroid use who presents with N/V.  Abnormal UA and CT evidence of right-sided pyelonephritis despite po abx outpt course 6/16/23. Clinical sepsis with leukocytosis, tachycardia, tachypnea and now positive blood cultures for e. Coli.  Remains on IVF, IV hydrocortisone q8h and IV zosyn q8h.      At the time of admission with the information available to the attending physician more than 2 nights Hospital complex care was anticipated, based on patient risk of adverse outcome if treated as outpatient and complex care required. Inpatient admission is appropriate based on the Medicare guidelines.       The information on this document is developed by the utilization review team in order for the business office to ensure compliance. This only denotes the appropriateness of proper admission status and does not reflect the quality of care rendered.   The definitions of Inpatient Status and Observation Status used in making the determination above are those provided in the CMS Coverage Manual, Chapter 1 and Chapter 6, section 70.4.         Sincerely,     Tanya Banegas, DO  Utilization Review  Physician Advisor  [facility].

## 2023-07-03 NOTE — SIGNIFICANT EVENT
Overnight Cross cover -- I was informed of ---      Collected 7/2/2023 12:17 AM      Status: Preliminary result      Visible to patient: No (not released)     Specimen Information: Peripheral Blood    0 Result Notes  Culture Positive on the 1st day of incubation Abnormal        Gram negative bacilli Panic     1 of 2 bottles        Resulting Agency: [facility]           Specimen Collected: 07/02/23 12:17 AM Last Resulted: 07/03/23 12:08 AM                    Assessment - GNB bacteremia - on zosyn    Plans - No change in the plan of care -- follow up verigene - if this is ESBL - inform MD       Will inform AM rounding hospitalist.

## 2023-07-03 NOTE — PROGRESS NOTES
[facility] MEDICINE PROGRESS NOTE      Summary: 82 year old female into [facility] on 7/1/2023 after presenting for nausea,  Vomiting.  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 She was  Treated on 6/16 with keflex for a UTI.      ER diagnostics show a positive urinalysis that has been sent for culture along with  Evidence of right side pyelonephritis per CT.      Hospital course has been marked by 1/2 blood culture positive for gram negative bacilli.  Urine culture greater than 100k with e coli; sensitivities pending. Empiric antibiotics continue  with zosyn.      Hospital day number 2      Problem list:    1.  Acute pyelonephritis/UTI: in setting of UTI twice in June without a culture   And immunocompromised state; continue empiric antibiotics   Until sensitivities result;   2.  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  Bacteremia:  Pt is blood culture positive 1/2 gram negative bacilli; add blood   Culture today and tomorrow; discharge will be based on 48 hours   Of blood culture negative  5.  Diarrhea due to acute infection: history of c diff, on oral vancomycin for    c diff prophylaxis; c diff toxin pending  6. History of GCA: she missed an infusion in June due to UTI; will resume outpatient   Infusion at discharge   7.  Leukocytosis: due to acute infection and steroid dosing        Keyona Hassan MD  [facility] Medicine  [facility]  Phone: #[phone]  Securely message with the Vocera Web Console (learn more here)  Text page via [employer] Paging/Directory     Interval History/Subjective: slept poorly;     Physical Exam/Objective:  Temp:  [97.3  F (36.3  C)-97.9  F (36.6  C)] 97.5  F (36.4  C)  Pulse:  []  82  Resp:  [16-18] 18  BP: (107-132)/(61-76) 119/72  SpO2:  [92 %-95 %] 92 %  Body mass index is 24.28 kg/m .    GENERAL:  Alert, appears comfortable, in no acute distress, appears stated age   HEAD:  Normocephalic, without obvious abnormality, atraumatic   EYES:  PERRL, conjunctiva/corneas clear, no scleral icterus, EOM's intact   NOSE: Nares normal, septum midline, mucosa normal, no drainage   THROAT: Dry tongue;    NECK: Supple, symmetrical, trachea midline   BACK:   Symmetric, no curvature, ROM normal   LUNGS:   Clear to auscultation bilaterally, no rales, rhonchi, or wheezing, symmetric chest rise on inhalation, respirations unlabored   CHEST WALL:  No tenderness or deformity   HEART:  Regular rate and rhythm, S1 and S2 normal, no murmur, rub, or gallop    ABDOMEN:   Soft, non-tender, bowel sounds active all four quadrants, no masses, no organomegaly, no rebound or guarding   EXTREMITIES: Extremities normal, atraumatic, no cyanosis or edema    SKIN: Dry to touch, no exanthems in the visualized areas   NEURO: Alert, oriented x3, moves all four extremities freely   PSYCH: Cooperative, behavior is appropriate      Data reviewed today: I personally reviewed all new medications, labs, imaging/diagnostics reports over the past 24 hours. Pertinent findings include:    Imaging:   No results found for this or any previous visit (from the past 24 hour(s)).    Labs:  Most Recent 3 BMP's:  Recent Labs   Lab Test 07/02/23  0618 07/01/23  2219 09/04/21  0755    141 137   POTASSIUM 4.4 3.8 3.9   CHLORIDE 103 103 103   CO2 25 26 23   BUN 13 15 8   CR 0.82 0.84 0.70   ANIONGAP 10 12 11   YIN 8.7 9.1 8.6    92 98       Medications:   Personally Reviewed.  Medications     sodium chloride 100 mL/hr at 07/03/23 0010       apixaban ANTICOAGULANT  5 mg Oral BID     cyanocobalamin  500 mcg Oral QAM     hydrocortisone sodium succinate PF  50 mg Intravenous Q8H     metoprolol succinate ER  50 mg Oral At Bedtime      pantoprazole  20 mg Oral QAM     piperacillin-tazobactam  3.375 g Intravenous Q8H     sertraline  50 mg Oral QAM     traZODone  50 mg Oral At Bedtime     vancomycin  125 mg Oral Daily

## 2023-07-03 NOTE — PLAN OF CARE
PRIMARY DIAGNOSIS: SIRS  OUTPATIENT/OBSERVATION GOALS TO BE MET BEFORE DISCHARGE:  1. ADLs back to baseline: Yes    2. Activity and level of assistance: Up with standby assistance.    3. Pain status: Pain free.    4. Return to near baseline physical activity: Yes     Discharge Planner Nurse   Safe discharge environment identified: Yes  Barriers to discharge: Yes, positive BC, UC pending.       Entered by: Keara Hancock RN 07/03/2023      Please review provider order for any additional goals.   Nurse to notify provider when observation goals have been met and patient is ready for discharge.Goal Outcome Evaluation:

## 2023-07-03 NOTE — PLAN OF CARE
PRIMARY DIAGNOSIS: SIRS  OUTPATIENT/OBSERVATION GOALS TO BE MET BEFORE DISCHARGE:  1. ADLs back to baseline: Yes    2. Activity and level of assistance: Up with standby assistance.    3. Pain status: Pain free.    4. Return to near baseline physical activity: Yes     Discharge Planner Nurse   Safe discharge environment identified: Yes  Barriers to discharge: Yes, positive BC's, UC still pending.       Entered by: Keara Hancock RN 07/03/2023    Pt A&Ox4, VSS, denied pain/nausea/vomiting.  NS running 100 ml/hr.  Mg protocol, recheck in the AM.  Positive BC results came back this shift.  MD bustamante, pt already on Zosyn and Vanco, no changes.  Please review provider order for any additional goals.   Nurse to notify provider when observation goals have been met and patient is ready for discharge.Goal Outcome Evaluation:

## 2023-07-03 NOTE — PLAN OF CARE
Problem: Plan of Care - These are the overarching goals to be used throughout the patient stay.    Goal: Plan of Care Review  Description: The Plan of Care Review/Shift note should be completed every shift.  The Outcome Evaluation is a brief statement about your assessment that the patient is improving, declining, or no change.  This information will be displayed automatically on your shift note.  Outcome: Progressing   Goal Outcome Evaluation:      Mg replaced; recheck in AM. Lomotil and imodium given; diarrhea subsided in late afternoon. Pt SBA.

## 2023-07-03 NOTE — PROGRESS NOTES
PRIMARY DIAGNOSIS: SIRS (Systemic Inflammatory Response Syndrome)  OUTPATIENT/OBSERVATION GOALS TO BE MET BEFORE DISCHARGE:  1. ADLs back to baseline: Yes    2. Activity and level of assistance: Up with standby assistance.    3. Pain status: Improved-controlled with oral pain medications.    4. Return to near baseline physical activity: Yes     Discharge Planner Nurse   Safe discharge environment identified: No  Barriers to discharge: Yes       Entered by: Jacqueline Carpenter RN 07/02/2023 10:55 PM     Cdiff result is negative. Pain to shoulder, left. Tylenol PRN given. IV Zosyn administered. IVF NS 0.9% @100 ml/hr. Tachy w/ , rest of VSS.

## 2023-07-04 LAB
ANION GAP SERPL CALCULATED.3IONS-SCNC: 12 MMOL/L (ref 5–18)
BACTERIA BLD CULT: ABNORMAL
BACTERIA BLD CULT: ABNORMAL
BUN SERPL-MCNC: 13 MG/DL (ref 8–28)
CALCIUM SERPL-MCNC: 7.9 MG/DL (ref 8.5–10.5)
CHLORIDE BLD-SCNC: 109 MMOL/L (ref 98–107)
CO2 SERPL-SCNC: 21 MMOL/L (ref 22–31)
CREAT SERPL-MCNC: 0.76 MG/DL (ref 0.6–1.1)
GFR SERPL CREATININE-BSD FRML MDRD: 79 ML/MIN/1.73M2
GLUCOSE BLD-MCNC: 113 MG/DL (ref 70–125)
MAGNESIUM SERPL-MCNC: 2.1 MG/DL (ref 1.8–2.6)
POTASSIUM BLD-SCNC: 4 MMOL/L (ref 3.5–5)
SODIUM SERPL-SCNC: 142 MMOL/L (ref 136–145)

## 2023-07-04 PROCEDURE — 99233 SBSQ HOSP IP/OBS HIGH 50: CPT | Performed by: EMERGENCY MEDICINE

## 2023-07-04 PROCEDURE — 80048 BASIC METABOLIC PNL TOTAL CA: CPT | Performed by: EMERGENCY MEDICINE

## 2023-07-04 PROCEDURE — 258N000003 HC RX IP 258 OP 636: Performed by: INTERNAL MEDICINE

## 2023-07-04 PROCEDURE — 250N000011 HC RX IP 250 OP 636: Mod: JZ | Performed by: EMERGENCY MEDICINE

## 2023-07-04 PROCEDURE — 250N000013 HC RX MED GY IP 250 OP 250 PS 637: Performed by: EMERGENCY MEDICINE

## 2023-07-04 PROCEDURE — 83735 ASSAY OF MAGNESIUM: CPT | Performed by: EMERGENCY MEDICINE

## 2023-07-04 PROCEDURE — 120N000001 HC R&B MED SURG/OB

## 2023-07-04 PROCEDURE — 250N000013 HC RX MED GY IP 250 OP 250 PS 637: Performed by: INTERNAL MEDICINE

## 2023-07-04 PROCEDURE — 250N000011 HC RX IP 250 OP 636: Mod: JZ | Performed by: INTERNAL MEDICINE

## 2023-07-04 PROCEDURE — 36415 COLL VENOUS BLD VENIPUNCTURE: CPT | Performed by: EMERGENCY MEDICINE

## 2023-07-04 RX ADMIN — DIPHENOXYLATE HYDROCHLORIDE AND ATROPINE SULFATE 1 TABLET: 2.5; .025 TABLET ORAL at 14:35

## 2023-07-04 RX ADMIN — APIXABAN 5 MG: 5 TABLET, FILM COATED ORAL at 09:03

## 2023-07-04 RX ADMIN — PANTOPRAZOLE SODIUM 20 MG: 20 TABLET, DELAYED RELEASE ORAL at 06:43

## 2023-07-04 RX ADMIN — TRAZODONE HYDROCHLORIDE 50 MG: 50 TABLET ORAL at 21:31

## 2023-07-04 RX ADMIN — HYDROCODONE BITARTRATE AND ACETAMINOPHEN 1 TABLET: 5; 325 TABLET ORAL at 07:47

## 2023-07-04 RX ADMIN — HYDROCORTISONE SODIUM SUCCINATE 25 MG: 100 INJECTION, POWDER, FOR SOLUTION INTRAMUSCULAR; INTRAVENOUS at 21:31

## 2023-07-04 RX ADMIN — HYDROCORTISONE SODIUM SUCCINATE 50 MG: 100 INJECTION, POWDER, FOR SOLUTION INTRAMUSCULAR; INTRAVENOUS at 06:42

## 2023-07-04 RX ADMIN — METOPROLOL SUCCINATE 50 MG: 25 TABLET, EXTENDED RELEASE ORAL at 21:31

## 2023-07-04 RX ADMIN — SERTRALINE HYDROCHLORIDE 50 MG: 50 TABLET, FILM COATED ORAL at 06:43

## 2023-07-04 RX ADMIN — PIPERACILLIN AND TAZOBACTAM 3.38 G: 3; .375 INJECTION, POWDER, LYOPHILIZED, FOR SOLUTION INTRAVENOUS at 06:42

## 2023-07-04 RX ADMIN — DIPHENOXYLATE HYDROCHLORIDE AND ATROPINE SULFATE 1 TABLET: 2.5; .025 TABLET ORAL at 09:03

## 2023-07-04 RX ADMIN — ACETAMINOPHEN 650 MG: 325 TABLET ORAL at 21:44

## 2023-07-04 RX ADMIN — SODIUM CHLORIDE: 9 INJECTION, SOLUTION INTRAVENOUS at 14:34

## 2023-07-04 RX ADMIN — DIPHENOXYLATE HYDROCHLORIDE AND ATROPINE SULFATE 1 TABLET: 2.5; .025 TABLET ORAL at 21:31

## 2023-07-04 RX ADMIN — CYANOCOBALAMIN TAB 500 MCG 500 MCG: 500 TAB at 06:43

## 2023-07-04 RX ADMIN — PIPERACILLIN AND TAZOBACTAM 3.38 G: 3; .375 INJECTION, POWDER, LYOPHILIZED, FOR SOLUTION INTRAVENOUS at 23:31

## 2023-07-04 RX ADMIN — HYDROCORTISONE SODIUM SUCCINATE 25 MG: 100 INJECTION, POWDER, FOR SOLUTION INTRAMUSCULAR; INTRAVENOUS at 15:23

## 2023-07-04 RX ADMIN — VANCOMYCIN HYDROCHLORIDE 125 MG: 125 CAPSULE ORAL at 07:47

## 2023-07-04 RX ADMIN — APIXABAN 5 MG: 5 TABLET, FILM COATED ORAL at 21:31

## 2023-07-04 RX ADMIN — PIPERACILLIN AND TAZOBACTAM 3.38 G: 3; .375 INJECTION, POWDER, LYOPHILIZED, FOR SOLUTION INTRAVENOUS at 14:35

## 2023-07-04 RX ADMIN — SODIUM CHLORIDE: 9 INJECTION, SOLUTION INTRAVENOUS at 03:53

## 2023-07-04 RX ADMIN — LOPERAMIDE HYDROCHLORIDE 2 MG: 2 CAPSULE ORAL at 17:54

## 2023-07-04 ASSESSMENT — ACTIVITIES OF DAILY LIVING (ADL)
ADLS_ACUITY_SCORE: 18
TOILETING_ISSUES: NO
CONCENTRATING,_REMEMBERING_OR_MAKING_DECISIONS_DIFFICULTY: NO
ADLS_ACUITY_SCORE: 35
ADLS_ACUITY_SCORE: 18
ADLS_ACUITY_SCORE: 35
FALL_HISTORY_WITHIN_LAST_SIX_MONTHS: NO
ADLS_ACUITY_SCORE: 18
DOING_ERRANDS_INDEPENDENTLY_DIFFICULTY: NO
DRESSING/BATHING_DIFFICULTY: NO
ADLS_ACUITY_SCORE: 35
WEAR_GLASSES_OR_BLIND: NO
ADLS_ACUITY_SCORE: 35
ADLS_ACUITY_SCORE: 18
WALKING_OR_CLIMBING_STAIRS_DIFFICULTY: NO
ADLS_ACUITY_SCORE: 35
ADLS_ACUITY_SCORE: 35
EQUIPMENT_CURRENTLY_USED_AT_HOME: GRAB BAR, TUB/SHOWER
DIFFICULTY_EATING/SWALLOWING: NO
ADLS_ACUITY_SCORE: 35
CHANGE_IN_FUNCTIONAL_STATUS_SINCE_ONSET_OF_CURRENT_ILLNESS/INJURY: NO
ADLS_ACUITY_SCORE: 35

## 2023-07-04 NOTE — PLAN OF CARE
Problem: UTI (Urinary Tract Infection)  Goal: Improved Infection Symptoms  Outcome: Progressing   Goal Outcome Evaluation: Patient A&Ox4. Tylenol given for slight shoulder pain. Took a shower this evening. IVF and IV antibiotics continue.

## 2023-07-04 NOTE — PLAN OF CARE
Problem: Plan of Care - These are the overarching goals to be used throughout the patient stay.    Goal: Readiness for Transition of Care  Outcome: Progressing   Goal Outcome Evaluation:    A&Ox4. SBA. Multiple loose stools today, so PRN Immodium given in addition to the Lomotil. IV Zosyn given. Denies pain. Hopeful discharge tomorrow. Call light within reach. Alarms on.

## 2023-07-04 NOTE — PROGRESS NOTES
[facility] MEDICINE PROGRESS NOTE      Summary: 82 year old female into [facility] on 7/1/2023 after presenting for nausea,  Vomiting.  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 She was  Treated on 6/16 with keflex for a UTI.      ER diagnostics show a positive urinalysis that has been sent for culture along with  Evidence of right side pyelonephritis per CT.      Hospital course has been marked by 1/2 blood culture positive for gram negative bacilli.  Urine culture greater than 100k with e coli; pansensitive.      Overnight she is afebrile; feeling improved.     Hospital day number 3      Problem list:    1.  Acute pyelonephritis/UTI: in setting of UTI twice in June without a culture   And immunocompromised state; urine is culture positive for e coli;   Pansensitive; will complete 1 more day of iv antibiotics with tenative   Plans for discharge tomorrow  2.  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  Bacteremia:  Pt is blood culture positive 1/2 positive for e coli; pansensitive   Has been afebrile; continue iv antibiotics today;   5.  Diarrhea due to acute infection: history of c diff, on oral vancomycin for    c diff prophylaxis; c diff toxin pending  6. History of GCA: she missed an infusion in June due to UTI; will resume outpatient   Infusion at discharge   7.  Leukocytosis: due to acute infection and steroid dosing        Keyona Hassan MD  [facility] Medicine  [facility]  Phone: #[phone]  Securely message with the Vocera Web Console (learn more here)  Text page via [employer] Paging/Directory     Interval History/Subjective: feels improved;    Physical Exam/Objective:  Temp:  [97.8   F (36.6  C)-98.9  F (37.2  C)] 98.9  F (37.2  C)  Pulse:  [] 88  Resp:  [16-18] 18  BP: (108-176)/(66-99) 158/88  SpO2:  [95 %-100 %] 95 %  Body mass index is 25.2 kg/m .    GENERAL:  Alert, appears comfortable, in no acute distress, appears stated age   HEAD:  Normocephalic, without obvious abnormality, atraumatic   EYES:  PERRL, conjunctiva/corneas clear, no scleral icterus, EOM's intact   NOSE: Nares normal, septum midline, mucosa normal, no drainage   THROAT: Dry tongue;    NECK: Supple, symmetrical, trachea midline   BACK:   Symmetric, no curvature, ROM normal   LUNGS:   Clear to auscultation bilaterally, no rales, rhonchi, or wheezing, symmetric chest rise on inhalation, respirations unlabored   CHEST WALL:  No tenderness or deformity   HEART:  Regular rate and rhythm, S1 and S2 normal, no murmur, rub, or gallop    ABDOMEN:   Soft, non-tender, bowel sounds active all four quadrants, no masses, no organomegaly, no rebound or guarding   EXTREMITIES: Extremities normal, atraumatic, no cyanosis or edema    SKIN: Dry to touch, no exanthems in the visualized areas   NEURO: Alert, oriented x3, moves all four extremities freely   PSYCH: Cooperative, behavior is appropriate      Data reviewed today: I personally reviewed all new medications, labs, imaging/diagnostics reports over the past 24 hours. Pertinent findings include:    Imaging:   No results found for this or any previous visit (from the past 24 hour(s)).    Labs:  Most Recent 3 BMP's:  Recent Labs   Lab Test 07/04/23  0717 07/03/23  0719 07/02/23  0618    139 138   POTASSIUM 4.0 3.9 4.4   CHLORIDE 109* 106 103   CO2 21* 25 25   BUN 13 13 13   CR 0.76 0.73 0.82   ANIONGAP 12 8 10   YIN 7.9* 8.1* 8.7    117 121       Medications:   Personally Reviewed.  Medications     sodium chloride 100 mL/hr at 07/04/23 0353       apixaban ANTICOAGULANT  5 mg Oral BID     cyanocobalamin  500 mcg Oral QAM     diphenoxylate-atropine  1 tablet Oral TID      hydrocortisone sodium succinate PF  25 mg Intravenous Q8H     metoprolol succinate ER  50 mg Oral At Bedtime     pantoprazole  20 mg Oral QAM     piperacillin-tazobactam  3.375 g Intravenous Q8H     sertraline  50 mg Oral QAM     traZODone  50 mg Oral At Bedtime     vancomycin  125 mg Oral Daily

## 2023-07-04 NOTE — PLAN OF CARE
Goal Outcome Evaluation:      Problem: Plan of Care - These are the overarching goals to be used throughout the patient stay.    Goal: Plan of Care Review  Description: The Plan of Care Review/Shift note should be completed every shift.  The Outcome Evaluation is a brief statement about your assessment that the patient is improving, declining, or no change.  This information will be displayed automatically on your shift note.  Outcome: Progressing     Problem: UTI (Urinary Tract Infection)  Goal: Improved Infection Symptoms  Outcome: Progressing     Patient denies pain.  VSS.   Up with SBA to bathroom.  Pt is alert and oriented x4.

## 2023-07-05 ENCOUNTER — APPOINTMENT (OUTPATIENT)
Dept: PHYSICAL THERAPY | Facility: CLINIC | Age: 83
DRG: 872 | End: 2023-07-05
Attending: EMERGENCY MEDICINE
Payer: COMMERCIAL

## 2023-07-05 LAB
ANION GAP SERPL CALCULATED.3IONS-SCNC: 9 MMOL/L (ref 5–18)
BUN SERPL-MCNC: 11 MG/DL (ref 8–28)
CALCIUM SERPL-MCNC: 8 MG/DL (ref 8.5–10.5)
CHLORIDE BLD-SCNC: 110 MMOL/L (ref 98–107)
CO2 SERPL-SCNC: 25 MMOL/L (ref 22–31)
CREAT SERPL-MCNC: 0.69 MG/DL (ref 0.6–1.1)
GFR SERPL CREATININE-BSD FRML MDRD: 87 ML/MIN/1.73M2
GLUCOSE BLD-MCNC: 96 MG/DL (ref 70–125)
MAGNESIUM SERPL-MCNC: 1.9 MG/DL (ref 1.8–2.6)
POTASSIUM BLD-SCNC: 3.7 MMOL/L (ref 3.5–5)
SODIUM SERPL-SCNC: 144 MMOL/L (ref 136–145)

## 2023-07-05 PROCEDURE — 99233 SBSQ HOSP IP/OBS HIGH 50: CPT | Performed by: EMERGENCY MEDICINE

## 2023-07-05 PROCEDURE — 97161 PT EVAL LOW COMPLEX 20 MIN: CPT | Mod: GP

## 2023-07-05 PROCEDURE — 250N000013 HC RX MED GY IP 250 OP 250 PS 637: Performed by: INTERNAL MEDICINE

## 2023-07-05 PROCEDURE — 250N000011 HC RX IP 250 OP 636: Mod: JZ | Performed by: INTERNAL MEDICINE

## 2023-07-05 PROCEDURE — 82374 ASSAY BLOOD CARBON DIOXIDE: CPT | Performed by: EMERGENCY MEDICINE

## 2023-07-05 PROCEDURE — 120N000001 HC R&B MED SURG/OB

## 2023-07-05 PROCEDURE — 250N000011 HC RX IP 250 OP 636: Mod: JZ | Performed by: EMERGENCY MEDICINE

## 2023-07-05 PROCEDURE — 36415 COLL VENOUS BLD VENIPUNCTURE: CPT | Performed by: EMERGENCY MEDICINE

## 2023-07-05 PROCEDURE — 258N000003 HC RX IP 258 OP 636: Performed by: INTERNAL MEDICINE

## 2023-07-05 PROCEDURE — 97530 THERAPEUTIC ACTIVITIES: CPT | Mod: GP

## 2023-07-05 PROCEDURE — 97116 GAIT TRAINING THERAPY: CPT | Mod: GP

## 2023-07-05 PROCEDURE — 83735 ASSAY OF MAGNESIUM: CPT | Performed by: EMERGENCY MEDICINE

## 2023-07-05 PROCEDURE — 250N000013 HC RX MED GY IP 250 OP 250 PS 637: Performed by: EMERGENCY MEDICINE

## 2023-07-05 RX ORDER — MAGNESIUM SULFATE HEPTAHYDRATE 40 MG/ML
2 INJECTION, SOLUTION INTRAVENOUS ONCE
Status: COMPLETED | OUTPATIENT
Start: 2023-07-05 | End: 2023-07-05

## 2023-07-05 RX ORDER — FUROSEMIDE 10 MG/ML
20 INJECTION INTRAMUSCULAR; INTRAVENOUS ONCE
Status: COMPLETED | OUTPATIENT
Start: 2023-07-05 | End: 2023-07-05

## 2023-07-05 RX ADMIN — PIPERACILLIN AND TAZOBACTAM 3.38 G: 3; .375 INJECTION, POWDER, LYOPHILIZED, FOR SOLUTION INTRAVENOUS at 06:34

## 2023-07-05 RX ADMIN — ACETAMINOPHEN 650 MG: 325 TABLET ORAL at 21:25

## 2023-07-05 RX ADMIN — DIPHENOXYLATE HYDROCHLORIDE AND ATROPINE SULFATE 1 TABLET: 2.5; .025 TABLET ORAL at 08:04

## 2023-07-05 RX ADMIN — APIXABAN 5 MG: 5 TABLET, FILM COATED ORAL at 21:22

## 2023-07-05 RX ADMIN — HYDROCORTISONE SODIUM SUCCINATE 25 MG: 100 INJECTION, POWDER, FOR SOLUTION INTRAMUSCULAR; INTRAVENOUS at 14:29

## 2023-07-05 RX ADMIN — PIPERACILLIN AND TAZOBACTAM 3.38 G: 3; .375 INJECTION, POWDER, LYOPHILIZED, FOR SOLUTION INTRAVENOUS at 14:17

## 2023-07-05 RX ADMIN — METOPROLOL SUCCINATE 50 MG: 25 TABLET, EXTENDED RELEASE ORAL at 21:22

## 2023-07-05 RX ADMIN — PANTOPRAZOLE SODIUM 20 MG: 20 TABLET, DELAYED RELEASE ORAL at 06:34

## 2023-07-05 RX ADMIN — TRAZODONE HYDROCHLORIDE 50 MG: 50 TABLET ORAL at 21:22

## 2023-07-05 RX ADMIN — SODIUM CHLORIDE: 9 INJECTION, SOLUTION INTRAVENOUS at 00:42

## 2023-07-05 RX ADMIN — SERTRALINE HYDROCHLORIDE 50 MG: 50 TABLET, FILM COATED ORAL at 06:34

## 2023-07-05 RX ADMIN — FUROSEMIDE 20 MG: 10 INJECTION, SOLUTION INTRAMUSCULAR; INTRAVENOUS at 09:36

## 2023-07-05 RX ADMIN — APIXABAN 5 MG: 5 TABLET, FILM COATED ORAL at 08:04

## 2023-07-05 RX ADMIN — HYDROCORTISONE SODIUM SUCCINATE 25 MG: 100 INJECTION, POWDER, FOR SOLUTION INTRAMUSCULAR; INTRAVENOUS at 21:53

## 2023-07-05 RX ADMIN — CYANOCOBALAMIN TAB 500 MCG 500 MCG: 500 TAB at 08:04

## 2023-07-05 RX ADMIN — DIPHENOXYLATE HYDROCHLORIDE AND ATROPINE SULFATE 1 TABLET: 2.5; .025 TABLET ORAL at 21:22

## 2023-07-05 RX ADMIN — MAGNESIUM SULFATE HEPTAHYDRATE 2 G: 40 INJECTION, SOLUTION INTRAVENOUS at 09:35

## 2023-07-05 RX ADMIN — DIPHENOXYLATE HYDROCHLORIDE AND ATROPINE SULFATE 1 TABLET: 2.5; .025 TABLET ORAL at 14:17

## 2023-07-05 RX ADMIN — VANCOMYCIN HYDROCHLORIDE 125 MG: 125 CAPSULE ORAL at 08:04

## 2023-07-05 RX ADMIN — HYDROCORTISONE SODIUM SUCCINATE 25 MG: 100 INJECTION, POWDER, FOR SOLUTION INTRAMUSCULAR; INTRAVENOUS at 06:33

## 2023-07-05 RX ADMIN — PIPERACILLIN AND TAZOBACTAM 3.38 G: 3; .375 INJECTION, POWDER, LYOPHILIZED, FOR SOLUTION INTRAVENOUS at 22:00

## 2023-07-05 ASSESSMENT — ACTIVITIES OF DAILY LIVING (ADL)
ADLS_ACUITY_SCORE: 18

## 2023-07-05 NOTE — PLAN OF CARE
Goal Outcome Evaluation:      Problem: Plan of Care - These are the overarching goals to be used throughout the patient stay.    Goal: Plan of Care Review  Description: The Plan of Care Review/Shift note should be completed every shift.  The Outcome Evaluation is a brief statement about your assessment that the patient is improving, declining, or no change.  This information will be displayed automatically on your shift note.  Outcome: Progressing     Problem: UTI (Urinary Tract Infection)  Goal: Improved Infection Symptoms  Outcome: Progressing    Patient reported right shoulder pain at beginning of shift, controlled with Tylenol prn.  No further complaints of pain over night.  Patient alert and oriented x 4.  UP with SBA to bathroom.  VSS, with the exception O2 sats in the low to mid 80's on RA.  2L O2 via NC and sats remained in the mid to upper 90's.

## 2023-07-05 NOTE — PROGRESS NOTES
[facility] MEDICINE PROGRESS NOTE      Summary: 82 year old female into [facility] on 7/1/2023 after presenting for nausea,  Vomiting.  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 She was  Treated on 6/16 with keflex for a UTI.      ER diagnostics show a positive urinalysis that has been sent for culture along with  Evidence of right side pyelonephritis per CT.      Hospital course has been marked by 1/2 blood culture positive for gram negative bacilli.  Urine culture greater than 100k with e coli; pansensitive.      Overnight she felt mild dyspnea.  Her loose stools continue with a small  Amount of blood on the TP this morning. I will discontinue IVF, give lasix 20 mg  Iv once and plan for discharge tomorrow.  Case discussed with her daughter  Yadi.     Hospital day number 4      Problem list:    1.  Acute pyelonephritis/UTI: in setting of UTI twice in June without a culture   And immunocompromised state; urine is culture positive for e coli;   Pansensitive; will complete 1 more day of iv antibiotics with tenative   Plans for discharge tomorrow  2.  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  Bacteremia:  Pt is blood culture positive 1/2 positive for e coli; continue iv    Antibiotics; change to oral on discharge  5.  Diarrhea due to acute infection: history of c diff, on oral vancomycin; loose   Stools continue (chronic issue)  6. History of GCA: she missed an infusion in June due to UTI; will resume outpatient   Infusion at discharge   7.  Leukocytosis: due to infection/steroids        Keyona Hassan MD  [facility] Medicine  [facility]  Phone: #[phone]  Securely message with the Vocera Web Console (learn more here)  Text page via [employer] Paging/Directory     Interval History/Subjective: felt mild  dyspnea last night;     Physical Exam/Objective:  Temp:  [97.5  F (36.4  C)-98.9  F (37.2  C)] 97.5  F (36.4  C)  Pulse:  [91-98] 95  Resp:  [18-20] 20  BP: (137-188)/() 188/100  SpO2:  [92 %-95 %] 95 %  Body mass index is 26.7 kg/m .    GENERAL:  Alert, appears comfortable, in no acute distress, appears stated age   HEAD:  Normocephalic, without obvious abnormality, atraumatic   EYES:  PERRL, conjunctiva/corneas clear, no scleral icterus, EOM's intact   NOSE: Nares normal, septum midline, mucosa normal, no drainage   THROAT: Dry tongue;    NECK: Supple, symmetrical, trachea midline   BACK:   Symmetric, no curvature, ROM normal   LUNGS:   Clear to auscultation bilaterally, no rales, rhonchi, or wheezing, symmetric chest rise on inhalation, respirations unlabored   CHEST WALL:  No tenderness or deformity   HEART:  Regular rate and rhythm, S1 and S2 normal, no murmur, rub, or gallop    ABDOMEN:   Soft, non-tender, bowel sounds active all four quadrants, no masses, no organomegaly, no rebound or guarding   EXTREMITIES: Extremities normal, atraumatic, no cyanosis or edema    SKIN: Dry to touch, no exanthems in the visualized areas   NEURO: Alert, oriented x3, moves all four extremities freely   PSYCH: Cooperative, behavior is appropriate      Data reviewed today: I personally reviewed all new medications, labs, imaging/diagnostics reports over the past 24 hours. Pertinent findings include:    Imaging:   No results found for this or any previous visit (from the past 24 hour(s)).    Labs:  Most Recent 3 BMP's:  Recent Labs   Lab Test 07/05/23  0728 07/04/23  0717 07/03/23  0719    142 139   POTASSIUM 3.7 4.0 3.9   CHLORIDE 110* 109* 106   CO2 25 21* 25   BUN 11 13 13   CR 0.69 0.76 0.73   ANIONGAP 9 12 8   YIN 8.0* 7.9* 8.1*   GLC 96 113 117       Medications:   Personally Reviewed.  Medications       apixaban ANTICOAGULANT  5 mg Oral BID     cyanocobalamin  500 mcg Oral QAM     diphenoxylate-atropine  1  tablet Oral TID     furosemide  20 mg Intravenous Once     hydrocortisone sodium succinate PF  25 mg Intravenous Q8H     magnesium sulfate  2 g Intravenous Once     metoprolol succinate ER  50 mg Oral At Bedtime     pantoprazole  20 mg Oral QAM     piperacillin-tazobactam  3.375 g Intravenous Q8H     sertraline  50 mg Oral QAM     traZODone  50 mg Oral At Bedtime     vancomycin  125 mg Oral Daily

## 2023-07-05 NOTE — PLAN OF CARE
Problem: Plan of Care - These are the overarching goals to be used throughout the patient stay.    Goal: Optimal Comfort and Wellbeing  Outcome: Progressing     Problem: UTI (Urinary Tract Infection)  Goal: Improved Infection Symptoms  Outcome: Progressing   Goal Outcome Evaluation:    A&Ox4, VSS (beside elevated BP) and afebrile. Pt reported getting SOB with walking, fluids were discontinued and given IV lasix. PIV is saline locked. Pt reported she is feeling much better since stopping fluids and getting Lasix. Had 3 loose stools this AM but none since. Denies pain and has been in her chair most of the day. Mg protocol, replaced, and recheck tomorrow. SBA, regular diet, and alarms on for safety. Discharge plan for tomorrow- blood cx pending.

## 2023-07-05 NOTE — PROGRESS NOTES
07/05/23 1023   Appointment Info   Signing Clinician's Name / Credentials (PT) Janessa Wynn PT   Living Environment   People in Home alone   Current Living Arrangements other (see comments)  (twin home)   Home Accessibility no concerns  (0 WILLIAM and all on one level.)   Self-Care   Usual Activity Tolerance good   Current Activity Tolerance moderate   Equipment Currently Used at Home none  (has FWW at home but does not currently use.)   Fall history within last six months no   Activity/Exercise/Self-Care Comment Reports independent with all mobility without AD at baseline.   General Information   Onset of Illness/Injury or Date of Surgery 07/01/23   Referring Physician Dr Keyona Hassan   Patient/Family Therapy Goals Statement (PT) Go home   Pertinent History of Current Problem (include personal factors and/or comorbidities that impact the POC) Admit for SIRS.   Existing Precautions/Restrictions fall  (bed/chair alarm)   Cognition   Affect/Mental Status (Cognition) WNL   Pain Assessment   Patient Currently in Pain No   Transfers   Impairments Contributing to Impaired Transfers decreased strength   Comment, (Transfers) Sit<>stand CGA.   Gait/Stairs (Locomotion)   [place] Level (Gait) contact guard   Assistive Device (Gait) walker, front-wheeled   Distance in Feet 20'x1   Distance in Feet (Gait) 15'x1, 120'x1   Pattern (Gait) step-through   Deviations/Abnormal Patterns (Gait) gait speed decreased;stride length decreased   Clinical Impression   Criteria for Skilled Therapeutic Intervention Yes, treatment indicated   PT Diagnosis (PT) Impaired functional mobility   Influenced by the following impairments weakness, fatigue   Functional limitations due to impairments transfers, gait   Clinical Presentation (PT Evaluation Complexity) Stable/Uncomplicated   Clinical Presentation Rationale Presents as medically diagnosed.   Clinical Decision Making (Complexity) low complexity   Planned Therapy Interventions (PT) bed  mobility training;gait training;home exercise program;patient/family education;strengthening;transfer training   Anticipated Equipment Needs at Discharge (PT) walker, rolling   Risk & Benefits of therapy have been explained evaluation/treatment results reviewed;care plan/treatment goals reviewed;risks/benefits reviewed;participants voiced agreement with care plan;participants included;patient   PT Total Evaluation Time   PT Eval, Low Complexity Minutes (61058) 15   Physical Therapy Goals   PT Frequency Daily   PT Predicted Duration/Target Date for Goal Attainment 07/12/23   PT Goals Bed Mobility;Transfers;Gait;PT Goal 1   PT: Bed Mobility Independent;Supine to/from sit   PT: Transfers Modified independent;Sit to/from stand;Assistive device   PT: Gait Modified independent;Assistive device;Rolling walker;150 feet   PT: Goal 1 Independent with HEP following written handouts.   Interventions   Interventions Quick Adds Gait Training;Therapeutic Activity;Therapeutic Procedure   Therapeutic Procedure/Exercise   Treatment Detail/Skilled Intervention Declined ther ex due to shortness of breath after ambulation.   Therapeutic Activity   Therapeutic Activities: dynamic activities to improve functional performance Minutes (50305) 10   Symptoms Noted During/After Treatment None   Treatment Detail/Skilled Intervention Sit>stand recliner to FWW SBA with cues to pause once standing to ensure balance prior to amb. Stood with FWW one minute SBA then amb 15' into bathroom. Toilet transfer SBA with cues for safe hand placement. Able to doff/mary brief, complete jose antonio hygiene then stand at sink to wash/dry hands after toileting SBA with cues for safety.   Gait Training   Gait Training Minutes (95310) 15   Symptoms Noted During/After Treatment (Gait Training) fatigue;shortness of breath   Treatment Detail/Skilled Intervention Pt normally does not use AD at home and wanted to trial amb without. Amb 25' without AD min A due to unsteadiness  then stated she would use walker. Amb 120' with FWW CGA with cues for safe use of walker and for pacing self. Required 2 standing rest breaks due to fatigue, shortness of breath. No unsteadiness when using walker. Declined seated rest break during walk. Unable to obtain O2 sats after return to room due to oximeter not working. Notified RN of SOB.   [place] Level (Gait Training) contact guard   Physical Assistance Level (Gait Training) verbal cues   Assistive Device (Gait Training) rolling walker   Pattern Analysis (Gait Training) other (see comments)  (step through gait)   Gait Analysis Deviations decreased brigette;increased time in double stance;decreased step length;decreased toe-to-floor clearance   Impairments (Gait Analysis/Training) strength decreased   PT Discharge Planning   PT Plan Transfers, gait tolerance, pt would like HEP with handouts.   PT Discharge Recommendation (DC Rec) (S)  home  (Discussed home PT however pt declined.)   PT Rationale for DC Rec Pt reports she feels a little weak but ok to go home at discharge. Decined home PT, prefers HEP that she can work on by herself. Son lives close by if any assist needed per pt.   PT Brief overview of current status SBA with transfers, CGA with gait 120' using FWW. First time ambulating in packer since hospitalized.   Total Session Time   Timed Code Treatment Minutes 25   Total Session Time (sum of timed and untimed services) 40

## 2023-07-06 ENCOUNTER — APPOINTMENT (OUTPATIENT)
Dept: PHYSICAL THERAPY | Facility: CLINIC | Age: 83
DRG: 872 | End: 2023-07-06
Payer: COMMERCIAL

## 2023-07-06 VITALS
HEIGHT: 66 IN | WEIGHT: 160.1 LBS | OXYGEN SATURATION: 95 % | RESPIRATION RATE: 20 BRPM | DIASTOLIC BLOOD PRESSURE: 80 MMHG | BODY MASS INDEX: 25.73 KG/M2 | HEART RATE: 76 BPM | TEMPERATURE: 98.5 F | SYSTOLIC BLOOD PRESSURE: 160 MMHG

## 2023-07-06 PROCEDURE — 250N000011 HC RX IP 250 OP 636: Mod: JZ | Performed by: INTERNAL MEDICINE

## 2023-07-06 PROCEDURE — 97110 THERAPEUTIC EXERCISES: CPT | Mod: GP

## 2023-07-06 PROCEDURE — 99238 HOSP IP/OBS DSCHRG MGMT 30/<: CPT | Performed by: EMERGENCY MEDICINE

## 2023-07-06 PROCEDURE — 97116 GAIT TRAINING THERAPY: CPT | Mod: GP

## 2023-07-06 PROCEDURE — 250N000013 HC RX MED GY IP 250 OP 250 PS 637: Performed by: HOSPITALIST

## 2023-07-06 PROCEDURE — 250N000011 HC RX IP 250 OP 636: Mod: JZ | Performed by: EMERGENCY MEDICINE

## 2023-07-06 PROCEDURE — 250N000013 HC RX MED GY IP 250 OP 250 PS 637: Performed by: EMERGENCY MEDICINE

## 2023-07-06 PROCEDURE — 250N000013 HC RX MED GY IP 250 OP 250 PS 637: Performed by: INTERNAL MEDICINE

## 2023-07-06 RX ORDER — LISINOPRIL 5 MG/1
5 TABLET ORAL DAILY
Qty: 30 TABLET | Refills: 0 | Status: SHIPPED | OUTPATIENT
Start: 2023-07-07

## 2023-07-06 RX ORDER — CLONIDINE HYDROCHLORIDE 0.1 MG/1
0.1 TABLET ORAL 3 TIMES DAILY PRN
Status: DISCONTINUED | OUTPATIENT
Start: 2023-07-06 | End: 2023-07-06 | Stop reason: HOSPADM

## 2023-07-06 RX ORDER — PREDNISONE 5 MG/1
15 TABLET ORAL DAILY
Status: SHIPPED | OUTPATIENT
Start: 2023-07-06 | End: 2023-07-11

## 2023-07-06 RX ORDER — VANCOMYCIN HYDROCHLORIDE 125 MG/1
125 CAPSULE ORAL DAILY
Qty: 5 CAPSULE | Refills: 0 | Status: SHIPPED | OUTPATIENT
Start: 2023-07-06

## 2023-07-06 RX ORDER — LISINOPRIL 5 MG/1
5 TABLET ORAL DAILY
Status: DISCONTINUED | OUTPATIENT
Start: 2023-07-06 | End: 2023-07-06 | Stop reason: HOSPADM

## 2023-07-06 RX ORDER — CIPROFLOXACIN 500 MG/1
500 TABLET, FILM COATED ORAL 2 TIMES DAILY
Qty: 10 TABLET | Refills: 0 | Status: SHIPPED | OUTPATIENT
Start: 2023-07-06

## 2023-07-06 RX ADMIN — HYDROCORTISONE SODIUM SUCCINATE 25 MG: 100 INJECTION, POWDER, FOR SOLUTION INTRAMUSCULAR; INTRAVENOUS at 06:21

## 2023-07-06 RX ADMIN — CLONIDINE HYDROCHLORIDE 0.1 MG: 0.1 TABLET ORAL at 08:18

## 2023-07-06 RX ADMIN — APIXABAN 5 MG: 5 TABLET, FILM COATED ORAL at 08:27

## 2023-07-06 RX ADMIN — PANTOPRAZOLE SODIUM 20 MG: 20 TABLET, DELAYED RELEASE ORAL at 06:14

## 2023-07-06 RX ADMIN — ACETAMINOPHEN 650 MG: 325 TABLET ORAL at 08:27

## 2023-07-06 RX ADMIN — VANCOMYCIN HYDROCHLORIDE 125 MG: 125 CAPSULE ORAL at 08:27

## 2023-07-06 RX ADMIN — CYANOCOBALAMIN TAB 500 MCG 500 MCG: 500 TAB at 06:33

## 2023-07-06 RX ADMIN — LISINOPRIL 5 MG: 5 TABLET ORAL at 09:06

## 2023-07-06 RX ADMIN — SERTRALINE HYDROCHLORIDE 50 MG: 50 TABLET, FILM COATED ORAL at 06:14

## 2023-07-06 RX ADMIN — PIPERACILLIN AND TAZOBACTAM 3.38 G: 3; .375 INJECTION, POWDER, LYOPHILIZED, FOR SOLUTION INTRAVENOUS at 06:05

## 2023-07-06 RX ADMIN — DIPHENOXYLATE HYDROCHLORIDE AND ATROPINE SULFATE 1 TABLET: 2.5; .025 TABLET ORAL at 08:27

## 2023-07-06 ASSESSMENT — ACTIVITIES OF DAILY LIVING (ADL)
ADLS_ACUITY_SCORE: 18

## 2023-07-06 NOTE — PLAN OF CARE
Problem: Plan of Care - These are the overarching goals to be used throughout the patient stay.    Goal: Optimal Comfort and Wellbeing  Outcome: Progressing     Problem: UTI (Urinary Tract Infection)  Goal: Improved Infection Symptoms  Outcome: Progressing   Goal Outcome Evaluation:       Patient calm and pleasant. A&O x4. O2 sats 95/RA.  C/O right arm pain 1/10 and requested tylenol 650mg which was effective. Less exertion with ambulation to the bathroom. Loose stools x2, however, small amounts per patient. PIV patent and currently running Zosyn. Mag 1.9,protocol, with recheck tomorrow. Reg diet, SBA, call light within reach, and alarms on for safety.

## 2023-07-06 NOTE — PLAN OF CARE
Problem: Plan of Care - These are the overarching goals to be used throughout the patient stay.    Goal: Optimal Comfort and Wellbeing  Outcome: Progressing  Intervention: Monitor Pain and Promote Comfort  Recent Flowsheet Documentation  Taken 7/6/2023 0018 by Jacqueline Carpenter RN  Pain Management Interventions: pillow support provided   Goal Outcome Evaluation:       Pt is alert and oriented x4. C/0 minimal pain to right arm but grimaces on movement. Offered pain meds but refused. BP was elevated /97, rechecked 190/99, (both in right arm), rechecked left arm 169/103, Pupils equal and brisk. Denies SOB/Chest pain. equal Motor strength on all extremities. Dr. Salazar informed d/t no PRN prescribed.  MD prescribed PRN Clonidine for SBP >180. Latest BP was 173/90. On SBA when ambulating to the bathroom.   Possible discharge to home today.

## 2023-07-06 NOTE — DISCHARGE SUMMARY
[facility] MEDICINE  DISCHARGE SUMMARY     Primary Care Physician: Francisco J Vergara  Admission Date: 7/1/2023   Discharge Provider: Keyona Hassan MD Discharge Date: 7/6/2023   Diet:   Active Diet and Nourishment Order   Procedures     Regular Diet Adult     Diet       Code Status: Full Code   Activity: as tolerated        Condition at Discharge:  improved     REASON FOR PRESENTATION(See Admission Note for Details)     Recurrent UTI    PRINCIPAL & ACTIVE DISCHARGE DIAGNOSES     1.  UTI, recurrent with pyelonephritis  2.  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  Bacteremia due to e coli  4.  Drug induced coagulation defect  5.  Leukocytosis due to steroids, acute infection  6.  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  GCA  8.  History of atrial fibrillation  9.  HTN, essential  10.  Diarrhea, acute on chronic due to IBS and acute infection  11.  Sepsis due to pyelonephritis        PENDING LABS     Unresulted Labs Ordered in the Past 30 Days of this Admission     Date and Time Order Name Status Description    7/3/2023 11:59 AM Blood Culture Hand, Left Preliminary     7/3/2023 11:59 AM Blood Culture Hand, Right Preliminary     7/1/2023 11:06 PM Blood Culture Arm, Right Preliminary             RECOMMENDATIONS TO OUTPATIENT PROVIDER FOR F/U VISIT     Follow-up Appointments     Follow-up and recommended labs and tests       Follow up with your primary provider in 1-2 weeks            DISPOSITION     Home    SUMMARY OF HOSPITAL COURSE:      82 year old female into [facility] on 7/21/2023 after presenting with nausea, vomiting,  Recurrent UTI.  PMHx includes steroid dependence due to GCA.  Diagnostics showed  A urinalysis consistent with infection.  It cultured a pansensitive e coli.  She was originallly  Supported with iv zosyn which was continued for 3 days due to her history of chronic  Steroid use and immunocompromised state.  Relevant labs include a white count of  12.8,  hemoblogin of 10.7 and normal creatinine.  She has 1 positive blood culture of e coli  On 7/2/2023 that was pansensitive.  Pt had some elevated blood pressures related  To an untreated essential hypertension.  Per the patients report she has had elevated  Readings during clinic visits.  I started lisinopril 5 mg though the dos may need adjusting.    Pt has a history of c diff. She has chronic diarrhea. C diff toxin was negative.  She was  Given vancomycin 125 mg once daily as c diff prophylaxis.  The diarrhea remained stable.    On the day of discharge she was afebrile and feeling improved. She will be discharged on  Ciprofloxacin for 5 additional days along with prednisone of 15 mg per day for 5 days.  She will connect with her rheumatology clinic soon for additional instructions regarding  Infusions and further care for her GCA.  She will continue to monitor her blood pressure  Readings and see her primary care clinic soon.      Discharge Medications with Med changes:     Current Discharge Medication List      START taking these medications    Details   ciprofloxacin (CIPRO) 500 MG tablet Take 1 tablet (500 mg) by mouth 2 times daily  Qty: 10 tablet, Refills: 0    Associated Diagnoses: Acute cystitis without hematuria      lisinopril (ZESTRIL) 5 MG tablet Take 1 tablet (5 mg) by mouth daily  Qty: 30 tablet, Refills: 0    Associated Diagnoses: Benign essential hypertension      vancomycin (VANCOCIN) 125 MG capsule Take 1 capsule (125 mg) by mouth daily  Qty: 5 capsule, Refills: 0    Associated Diagnoses: Diarrhea, unspecified type         CONTINUE these medications which have NOT CHANGED    Details   abatacept (ORENCIA) 250 MG injection Inject 750 mg into the vein every 28 days      acetaminophen (TYLENOL) 500 MG tablet Take 1,000 mg by mouth At Bedtime      apixaban ANTICOAGULANT (ELIQUIS) 5 MG tablet Take 5 mg by mouth 2 times daily      Calcium Carb-Cholecalciferol 600-20 MG-MCG TABS Take 1 tablet by  mouth daily      cyanocobalamin (VITAMIN B-12) 500 MCG SUBL sublingual tablet Place 500 mcg under the tongue every morning      denosumab (PROLIA) 60 MG/ML SOSY injection Inject 60 mg Subcutaneous every 6 months      diclofenac (VOLTAREN) 1 % topical gel Apply 2 g topically 4 times daily as needed      diphenoxylate-atropine (LOMOTIL) 2.5-0.025 MG tablet Take 1 tablet by mouth 4 times daily as needed      glucosamine-chondroitin 500-400 MG CAPS per capsule Take 2 capsules by mouth every morning      LANsoprazole (PREVACID) 15 MG DR capsule Take 15 mg by mouth every morning      metoprolol succinate ER (TOPROL XL) 50 MG 24 hr tablet Take 50 mg by mouth At Bedtime      predniSONE (DELTASONE) 5 MG tablet Take 10 mg by mouth every morning      sertraline (ZOLOFT) 50 MG tablet Take 50 mg by mouth every morning      traZODone (DESYREL) 50 MG tablet Take 50 mg by mouth At Bedtime                     Consults       PHYSICAL THERAPY ADULT IP CONSULT    Immunizations given this encounter     Most Recent Immunizations   Administered Date(s) Administered     COVID-19 Bivalent 18+ (Moderna) 11/18/2022     COVID-19 Monovalent 18+ (Moderna) 08/05/2022     Influenza (intradermal) 09/17/2020     Pneumo Conj 13-V (2010&after) 09/14/2016     Pneumococcal 23 valent 07/09/2008     TD,PF 7+ (Tenivac) 12/18/1995     TDAP Vaccine (Adacel) 06/27/2018     Td (Adult), Adsorbed 09/07/2005     Zoster vaccine, live 08/17/2020              SIGNIFICANT IMAGING FINDINGS     Results for orders placed or performed during the hospital encounter of 07/01/23   CT Abdomen Pelvis w/o Contrast    Impression    IMPRESSION:   1.  Mild right perinephric stranding. No bran hydronephrosis. No calcified ureteral or bladder stone. Findings could reflect an infectious or inflammatory process or the sequela of a recently passed stone. Recommend correlation with urinalysis.    2.  A single tiny left midpole nonobstructing stone.    3.  Colonic diverticulosis  without convincing evidence of acute diverticulitis.     CT Head w/o Contrast    Impression    IMPRESSION:  1.  No acute intracranial process.   2.  Age-related changes described above.         SIGNIFICANT LABORATORY FINDINGS     Most Recent 3 BMP's:Recent Labs   Lab Test 07/05/23  0728 07/04/23  0717 07/03/23  0719    142 139   POTASSIUM 3.7 4.0 3.9   CHLORIDE 110* 109* 106   CO2 25 21* 25   BUN 11 13 13   CR 0.69 0.76 0.73   ANIONGAP 9 12 8   YIN 8.0* 7.9* 8.1*   GLC 96 113 117           Discharge Orders        Reason for your hospital stay    Cystitis, GCA, HTN     Follow-up and recommended labs and tests     Follow up with your primary provider in 1-2 weeks     Activity    As tolerated     Diet    regular       Examination   Physical Exam   Temp:  [98  F (36.7  C)-98.7  F (37.1  C)] 98.5  F (36.9  C)  Pulse:  [76-91] 76  Resp:  [20] 20  BP: (153-193)/() 160/80  SpO2:  [95 %] 95 %  Wt Readings from Last 1 Encounters:   07/06/23 72.6 kg (160 lb 1.6 oz)       General Appearance: Alert, oriented; vitals reviewed; elevated blood pressure  Respiratory: clear bilaterally  Cardiovascular: regular  GI: soft, NDNT, +BS  Skin: no rashes        Please see EMR for more detailed significant labs, imaging, consultant notes etc.    IKeyona MD, personally saw the patient today and spent less than or equal to 30 minutes discharging this patient.    Keyona Hassan MD  [facility]    CC:Francisco J Vergara

## 2023-07-06 NOTE — PLAN OF CARE
Patient discharged at 1310 via daughter to home without services. PIV pulled, AVS reviewed, room checked and belongings sent with patient.

## 2023-07-07 ENCOUNTER — PATIENT OUTREACH (OUTPATIENT)
Dept: CARE COORDINATION | Facility: CLINIC | Age: 83
End: 2023-07-07
Payer: COMMERCIAL

## 2023-07-07 LAB — BACTERIA BLD CULT: NO GROWTH

## 2023-07-07 NOTE — PROGRESS NOTES
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   Differential diagnosis includes but not limited to rigors, chills, urinary tract infection, sepsis, SIRS, COVID-19.  Patient recently diagnosed with urinary tract infection 3 weeks ago and started Keflex.  Her Keflex was extended for 1 week and she completed a 14-day course.  She reports symptoms seem to improve and she was doing well until this evening when she had shaking but denies seizure activity as she was alert during the entire episode.  She reports it was more chills with rigors shaking.  Patient concerning for recurrent infection.  She reports similar symptoms when she had prior urinary tract infections which she has had multiple times.  Labs and urinalysis obtained.  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  Patient had leukocytosis on her CBC and concerning for infection on my interpretation.  Additionally urinalysis interpretation consistent with positive for urinary tract infection including nitrites and leukocytosis in the urine.  Patient was started on antibiotics and I discussed allergies with patient.  Patient recently and first generation cephalosporins however per protocol will give Rocephin for urine tract infection.  Kidney function is normal and after discussion with patient and daughter she did have some nausea developing which was treated with Zofran  "initially but then returned.  With the worsening nausea she did have vomiting and had some abdominal pain.  Reglan was ordered and patient was plan for admission to hospitalist.  Hospitalist did see patient changed Reglan to Compazine which seemed to help.  We also discussed CT of the abdomen due to the pain but this showed similar findings to urinalysis with perinephric stranding and likely infection.  Patient will be plan for admission to observation status for pyelonephritis.    ASSESSMENT      Discharge Assessment  How are you doing now that you are home?: \"I'm okay I guess I was up last night with bowel movements and voiding and everything. I was up from about 3 am to 4:30 am and then I fell asleep and then I was up again with the bowel movements and everything but I do have a neighbor that's been coming over and checking in on me.\"  How are your symptoms? (Red Flag symptoms escalate to triage hotline per guidelines): Improved  Do you feel your condition is stable enough to be safe at home until your provider visit?: Yes  Does the patient have their discharge instructions? : Yes  Does the patient have questions regarding their discharge instructions? : No  Were you started on any new medications or were there changes to any of your previous medications? : Yes  Does the patient have all of their medications?: Yes  Do you have questions regarding any of your medications? : No  Do you have all of your needed medical supplies or equipment (DME)?  (i.e. oxygen tank, CPAP, cane, etc.): Yes  Discharge follow-up appointment scheduled within 14 calendar days? : No  Is patient agreeable to assistance with scheduling? : Yes (Pt prefers to contact her primary care clinic on Monday to schedule a PCP f/u appt.)    Post-op ([facility] Only)  If the patient had a surgery or procedure, do they have any questions for a nurse?: No    PLAN                                                      Outpatient Plan:      Follow-up and " recommended labs and tests  Follow up with your primary provider in 1-2 weeks    No future appointments.      For any urgent concerns, please contact our 24 hour nurse triage line: [phone] ([phone])         CAREN Hutchison  [phone]  [facility]

## 2023-07-08 LAB
BACTERIA BLD CULT: NO GROWTH
BACTERIA BLD CULT: NO GROWTH

## 2023-07-09 ENCOUNTER — HOSPITAL ENCOUNTER (EMERGENCY)
Facility: CLINIC | Age: 83
Discharge: HOME | End: 2023-07-09
Attending: EMERGENCY MEDICINE | Admitting: EMERGENCY MEDICINE
Payer: COMMERCIAL

## 2023-07-09 VITALS
SYSTOLIC BLOOD PRESSURE: 169 MMHG | HEIGHT: 66 IN | DIASTOLIC BLOOD PRESSURE: 91 MMHG | HEART RATE: 99 BPM | OXYGEN SATURATION: 98 % | TEMPERATURE: 97.1 F | RESPIRATION RATE: 16 BRPM | BODY MASS INDEX: 23.3 KG/M2 | WEIGHT: 145 LBS

## 2023-07-09 DIAGNOSIS — I48.20 CHRONIC ATRIAL FIBRILLATION (H): ICD-10-CM

## 2023-07-09 DIAGNOSIS — K52.9 CHRONIC DIARRHEA: ICD-10-CM

## 2023-07-09 LAB
ALBUMIN SERPL-MCNC: 4 G/DL (ref 3.5–5)
ALP SERPL-CCNC: 132 U/L (ref 45–120)
ALT SERPL W P-5'-P-CCNC: 49 U/L (ref 0–45)
ANION GAP SERPL CALCULATED.3IONS-SCNC: 13 MMOL/L (ref 5–18)
AST SERPL W P-5'-P-CCNC: 26 U/L (ref 0–40)
BASOPHILS # BLD AUTO: 0 10E3/UL (ref 0–0.2)
BASOPHILS NFR BLD AUTO: 0 %
BILIRUB DIRECT SERPL-MCNC: 0.4 MG/DL
BILIRUB SERPL-MCNC: 1.5 MG/DL (ref 0–1)
BUN SERPL-MCNC: 16 MG/DL (ref 8–28)
C REACTIVE PROTEIN LHE: 3.9 MG/DL (ref 0–?)
CALCIUM SERPL-MCNC: 9.9 MG/DL (ref 8.5–10.5)
CHLORIDE BLD-SCNC: 99 MMOL/L (ref 98–107)
CO2 SERPL-SCNC: 29 MMOL/L (ref 22–31)
CREAT SERPL-MCNC: 0.87 MG/DL (ref 0.6–1.1)
EOSINOPHIL # BLD AUTO: 0 10E3/UL (ref 0–0.7)
EOSINOPHIL NFR BLD AUTO: 0 %
ERYTHROCYTE [DISTWIDTH] IN BLOOD BY AUTOMATED COUNT: 14.4 % (ref 10–15)
GFR SERPL CREATININE-BSD FRML MDRD: 67 ML/MIN/1.73M2
GLUCOSE BLD-MCNC: 146 MG/DL (ref 70–125)
HCT VFR BLD AUTO: 34.6 % (ref 35–47)
HGB BLD-MCNC: 10.8 G/DL (ref 11.7–15.7)
HOLD SPECIMEN: NORMAL
IMM GRANULOCYTES # BLD: 0.1 10E3/UL
IMM GRANULOCYTES NFR BLD: 1 %
LIPASE SERPL-CCNC: 46 U/L (ref 0–52)
LYMPHOCYTES # BLD AUTO: 0.6 10E3/UL (ref 0.8–5.3)
LYMPHOCYTES NFR BLD AUTO: 6 %
MAGNESIUM SERPL-MCNC: 2 MG/DL (ref 1.8–2.6)
MCH RBC QN AUTO: 30.9 PG (ref 26.5–33)
MCHC RBC AUTO-ENTMCNC: 31.2 G/DL (ref 31.5–36.5)
MCV RBC AUTO: 99 FL (ref 78–100)
MONOCYTES # BLD AUTO: 0.4 10E3/UL (ref 0–1.3)
MONOCYTES NFR BLD AUTO: 4 %
NEUTROPHILS # BLD AUTO: 8.4 10E3/UL (ref 1.6–8.3)
NEUTROPHILS NFR BLD AUTO: 89 %
NRBC # BLD AUTO: 0 10E3/UL
NRBC BLD AUTO-RTO: 0 /100
PLATELET # BLD AUTO: 270 10E3/UL (ref 150–450)
POTASSIUM BLD-SCNC: 3.4 MMOL/L (ref 3.5–5)
PROT SERPL-MCNC: 7.5 G/DL (ref 6–8)
RBC # BLD AUTO: 3.5 10E6/UL (ref 3.8–5.2)
SODIUM SERPL-SCNC: 141 MMOL/L (ref 136–145)
WBC # BLD AUTO: 9.4 10E3/UL (ref 4–11)

## 2023-07-09 PROCEDURE — 258N000003 HC RX IP 258 OP 636: Performed by: EMERGENCY MEDICINE

## 2023-07-09 PROCEDURE — 96360 HYDRATION IV INFUSION INIT: CPT

## 2023-07-09 PROCEDURE — 83690 ASSAY OF LIPASE: CPT | Performed by: EMERGENCY MEDICINE

## 2023-07-09 PROCEDURE — 99283 EMERGENCY DEPT VISIT LOW MDM: CPT | Mod: 25

## 2023-07-09 PROCEDURE — 86140 C-REACTIVE PROTEIN: CPT | Performed by: EMERGENCY MEDICINE

## 2023-07-09 PROCEDURE — 80053 COMPREHEN METABOLIC PANEL: CPT | Performed by: EMERGENCY MEDICINE

## 2023-07-09 PROCEDURE — 82248 BILIRUBIN DIRECT: CPT | Performed by: EMERGENCY MEDICINE

## 2023-07-09 PROCEDURE — 83735 ASSAY OF MAGNESIUM: CPT | Performed by: EMERGENCY MEDICINE

## 2023-07-09 PROCEDURE — 96361 HYDRATE IV INFUSION ADD-ON: CPT

## 2023-07-09 PROCEDURE — 36415 COLL VENOUS BLD VENIPUNCTURE: CPT | Performed by: EMERGENCY MEDICINE

## 2023-07-09 PROCEDURE — 250N000013 HC RX MED GY IP 250 OP 250 PS 637: Performed by: EMERGENCY MEDICINE

## 2023-07-09 PROCEDURE — 85025 COMPLETE CBC W/AUTO DIFF WBC: CPT | Performed by: EMERGENCY MEDICINE

## 2023-07-09 RX ORDER — POTASSIUM CHLORIDE 1500 MG/1
40 TABLET, EXTENDED RELEASE ORAL ONCE
Status: COMPLETED | OUTPATIENT
Start: 2023-07-09 | End: 2023-07-09

## 2023-07-09 RX ORDER — DIPHENOXYLATE HCL/ATROPINE 2.5-.025MG
1 TABLET ORAL 4 TIMES DAILY PRN
Qty: 28 TABLET | Refills: 0 | Status: SHIPPED | OUTPATIENT
Start: 2023-07-09

## 2023-07-09 RX ADMIN — SODIUM CHLORIDE 1000 ML: 9 INJECTION, SOLUTION INTRAVENOUS at 16:05

## 2023-07-09 RX ADMIN — POTASSIUM CHLORIDE 40 MEQ: 1500 TABLET, EXTENDED RELEASE ORAL at 16:09

## 2023-07-09 ASSESSMENT — ACTIVITIES OF DAILY LIVING (ADL): ADLS_ACUITY_SCORE: 35

## 2023-07-09 NOTE — ED PROVIDER NOTES
Emergency Department Encounter     Evaluation Date & Time:   No admission date for patient encounter.    CHIEF COMPLAINT:  Diarrhea      Triage Note:Pt here with diarrhea. States she was recently in the hospital for this and is just not any better. Initial heart rate 120-130. Pt states she has a fib and is on eliquis and metoprolol. Pt rate did slow to 103 while sitting in triage. Pt said she did need a water pill while she was in the hospital. Is not on one now. Pt reports history of irritable bowel.                ED COURSE & MEDICAL DECISION MAKING:     ED Course as of 07/09/23 2000   Sun Jul 09, 2023   1726 HR improved to 90s. Will update, reassess pt.    Labs overall reassuring, stable from previous. Will plan for discharge, Rx for Lomotil, outpatient follow up.   1737 Pt remains overall improved here. Will dishcarge, Rx for lomotil discussed, return and follow up instruction discussed.       Pt was recently hospitalized and discharged on Friday (2 days ago) for UTI, still on antibiotics, here with ongoing loose stools. Pt reports chronic IBS and diarrhea at baseline, but worsened by antibiotics. Pt tried imodium yesterday with some relief.  She has no fevers, n/v or abd pain or tenderness. Very benign exam, but a little more tachy with her afib today. Will get labs, hydrate, reassess.  Pt did have C-diff studies and stool studies from 7/2/23 on last hospitalization reviewed and all negative.     3:20 PM I introduced myself to the patient, obtained patient history, performed a physical exam, and discussed plan for ED workup including potential diagnostic laboratory/imaging studies and interventions.  5:29 PM Rechecked and updated the patient. We discussed the plan for discharge and the patient is agreeable. Reviewed supportive cares, symptomatic treatment, outpatient follow up, and reasons to return to the Emergency Department. Patient to be discharged by ED RN.     Medical Decision  Making    History:    Supplemental history from: N/A    External Record(s) reviewed: Documented in chart, if applicable.    Work Up:    Chart documentation includes differential considered and any EKGs or imaging independently interpreted by provider, where specified.    In additional to work up documented, I considered the following work up: Documented in chart, if applicable.    External consultation:    Discussion of management with another provider: Documented in chart, if applicable    Complicating factors:    Care impacted by chronic illness: Anticoagulated State, Hyperlipidemia and Other: IBS    Care affected by social determinants of health: N/A    Disposition considerations: Discharge. I prescribed additional prescription strength medication(s) as charted. I considered admission, but ultimately discharged patient after workup, improvement, outpatient plan..      At the conclusion of the encounter I discussed the results of all the tests and the disposition. The questions were answered. The patient or family acknowledged understanding and was agreeable with the care plan.      MEDICATIONS GIVEN IN THE EMERGENCY DEPARTMENT:  Medications   potassium chloride ER (KLOR-CON M) CR tablet 40 mEq (40 mEq Oral $Given 7/9/23 7168)   0.9% sodium chloride BOLUS (0 mLs Intravenous Stopped 7/9/23 1803)       NEW PRESCRIPTIONS STARTED AT TODAY'S ED VISIT:  Discharge Medication List as of 7/9/2023  6:46 PM      START taking these medications    Details   !! diphenoxylate-atropine (LOMOTIL) 2.5-0.025 MG tablet Take 1 tablet by mouth 4 times daily as needed for diarrhea, Disp-28 tablet, R-0, Local Print       !! - Potential duplicate medications found. Please discuss with provider.          HPI   The history is provided by the patient. No  was used.      Lilian La is a 82 year old female with a pertinent history of IBS, HLD, and Afib with RVR on Eliquis who presents to this ED via walk in for  evaluation of diarrhea.    The patient reports she was admitted to the hospital last week for a UTI and at that time had diarrhea. When she was discharged from the hospital she was prescribed a course of antibiotics, but was not prescribed any medication to help improve her diarrhea. She is concerned that the combination of the antibiotics and her IBS are causing her frequent episodes of diarrhea. She reports 10-12 episodes of diarrhea yesterday and 6+ episodes of diarrhea today. Having diarrhea is not abnormal for her due to her IBS, but her diarrhea is much more frequent than normal.    She is otherwise feeling like her normal self and denies any other complaints at this time.    Chart Review  7/1/23 - Admission for UTI: Initially presented with nausea and vomiting. UA consistent with UTI, started on IV Zosyn. Culture returned pansensitive E coli. Patient has a history of chronic diarrhea, C diff negative, but started on 125 mg vancomycin as C diff prophylaxis. Discharged on 7/7/23 with 5 day course of ciprofloxacin and prednisone, and started on lisinopril (5 mg) for blood pressure.    REVIEW OF SYSTEMS:  See HPI      Medical History   History reviewed. No pertinent past medical history.    History reviewed. No pertinent surgical history.    History reviewed. No pertinent family history.         diphenoxylate-atropine (LOMOTIL) 2.5-0.025 MG tablet  abatacept (ORENCIA) 250 MG injection  acetaminophen (TYLENOL) 500 MG tablet  apixaban ANTICOAGULANT (ELIQUIS) 5 MG tablet  Calcium Carb-Cholecalciferol 600-20 MG-MCG TABS  ciprofloxacin (CIPRO) 500 MG tablet  cyanocobalamin (VITAMIN B-12) 500 MCG SUBL sublingual tablet  denosumab (PROLIA) 60 MG/ML SOSY injection  diclofenac (VOLTAREN) 1 % topical gel  diphenoxylate-atropine (LOMOTIL) 2.5-0.025 MG tablet  glucosamine-chondroitin 500-400 MG CAPS per capsule  LANsoprazole (PREVACID) 15 MG DR capsule  lisinopril (ZESTRIL) 5 MG tablet  metoprolol succinate ER (TOPROL XL) 50  "MG 24 hr tablet  predniSONE (DELTASONE) 5 MG tablet  sertraline (ZOLOFT) 50 MG tablet  traZODone (DESYREL) 50 MG tablet  vancomycin (VANCOCIN) 125 MG capsule        Physical Exam     Vitals:  BP (!) 169/91   Pulse 99   Temp 97.1  F (36.2  C)   Resp 16   Ht 1.676 m (5' 6\")   Wt 65.8 kg (145 lb)   SpO2 98%   BMI 23.40 kg/m      PHYSICAL EXAM:   Physical Exam  Vitals and nursing note reviewed.   Constitutional:       General: She is not in acute distress.     Appearance: Normal appearance.      Comments: Elderly female   HENT:      Head: Normocephalic and atraumatic.      Nose: Nose normal.      Mouth/Throat:      Mouth: Mucous membranes are moist.   Eyes:      Pupils: Pupils are equal, round, and reactive to light.   Cardiovascular:      Rate and Rhythm: Normal rate. Rhythm irregularly irregular.      Pulses: Normal pulses.           Radial pulses are 2+ on the right side and 2+ on the left side.        Dorsalis pedis pulses are 2+ on the right side and 2+ on the left side.   Pulmonary:      Effort: Pulmonary effort is normal. No respiratory distress.      Breath sounds: Normal breath sounds.   Abdominal:      Palpations: Abdomen is soft.      Tenderness: There is no abdominal tenderness.   Musculoskeletal:      Cervical back: Full passive range of motion without pain and neck supple.      Comments: No calf tenderness or swelling b/l   Skin:     General: Skin is warm.      Findings: No rash.   Neurological:      General: No focal deficit present.      Mental Status: She is alert. Mental status is at baseline.      Comments: Fluent speech, no acute lateralizing deficits   Psychiatric:         Mood and Affect: Mood normal.         Behavior: Behavior normal.         Results     LAB:  All pertinent labs reviewed and interpreted  Labs Ordered and Resulted from Time of ED Arrival to Time of ED Departure   BASIC METABOLIC PANEL - Abnormal       Result Value    Sodium 141      Potassium 3.4 (*)     Chloride 99      " Carbon Dioxide (CO2) 29      Anion Gap 13      Urea Nitrogen 16      Creatinine 0.87      Calcium 9.9      Glucose 146 (*)     GFR Estimate 67     HEPATIC FUNCTION PANEL - Abnormal    Bilirubin Total 1.5 (*)     Bilirubin Direct 0.4      Protein Total 7.5      Albumin 4.0      Alkaline Phosphatase 132 (*)     AST 26      ALT 49 (*)    CBC WITH PLATELETS AND DIFFERENTIAL - Abnormal    WBC Count 9.4      RBC Count 3.50 (*)     Hemoglobin 10.8 (*)     Hematocrit 34.6 (*)     MCV 99      MCH 30.9      MCHC 31.2 (*)     RDW 14.4      Platelet Count 270      % Neutrophils 89      % Lymphocytes 6      % Monocytes 4      % Eosinophils 0      % Basophils 0      % Immature Granulocytes 1      NRBCs per 100 WBC 0      Absolute Neutrophils 8.4 (*)     Absolute Lymphocytes 0.6 (*)     Absolute Monocytes 0.4      Absolute Eosinophils 0.0      Absolute Basophils 0.0      Absolute Immature Granulocytes 0.1      Absolute NRBCs 0.0     CRP INFLAMMATION - Abnormal    CRP 3.9 (*)    LIPASE - Normal    Lipase 46     MAGNESIUM - Normal    Magnesium 2.0     ENTERIC BACTERIA AND VIRUS PANEL BY DIALLO STOOL   C. DIFFICILE TOXIN B PCR WITH REFLEX TO C. DIFFICILE ANTIGEN AND TOXINS A/B EIA   ROUTINE PARASITOLOGY EXAM       RADIOLOGY:  No orders to display                  FINAL IMPRESSION:    ICD-10-CM    1. Chronic diarrhea  K52.9       2. Chronic atrial fibrillation (H)  I48.20           0 minutes of critical care time      I, Awais Rivera, am serving as a scribe to document services personally performed by Dr. Eliazar Cuevas, based on my observations and the provider's statements to me. I, Eliazar Cuevas, DO attest that Awais Rivera is acting in a scribe capacity, has observed my performance of the services and has documented them in accordance with my direction.      Eliazar Cuevas DO  Emergency Medicine  [facility] EMERGENCY ROOM  7/9/2023  3:27 PM        Eliazar Cuevas MD  07/09/23 2000

## 2023-07-09 NOTE — ED TRIAGE NOTES
Pt here with diarrhea. States she was recently in the hospital for this and is just not any better. Initial heart rate 120-130. Pt states she has a fib and is on eliquis and metoprolol. Pt rate did slow to 103 while sitting in triage. Pt said she did need a water pill while she was in the hospital. Is not on one now. Pt reports history of irritable bowel.

## 2023-07-09 NOTE — DISCHARGE INSTRUCTIONS
Take lomotil as directed/needed for diarrhea.  Hydrate well and follow up with primary clinic for re-evaluation and ongoing cares.

## 2024-09-12 ENCOUNTER — HOSPITAL ENCOUNTER (EMERGENCY)
Facility: CLINIC | Age: 84
Discharge: HOME | End: 2024-09-12
Attending: EMERGENCY MEDICINE | Admitting: EMERGENCY MEDICINE
Payer: COMMERCIAL

## 2024-09-12 ENCOUNTER — APPOINTMENT (OUTPATIENT)
Dept: RADIOLOGY | Facility: CLINIC | Age: 84
End: 2024-09-12
Attending: EMERGENCY MEDICINE
Payer: COMMERCIAL

## 2024-09-12 VITALS
OXYGEN SATURATION: 97 % | TEMPERATURE: 98.3 F | SYSTOLIC BLOOD PRESSURE: 141 MMHG | BODY MASS INDEX: 20.88 KG/M2 | HEART RATE: 85 BPM | DIASTOLIC BLOOD PRESSURE: 65 MMHG | WEIGHT: 133 LBS | RESPIRATION RATE: 20 BRPM | HEIGHT: 67 IN

## 2024-09-12 DIAGNOSIS — U07.1 INFECTION DUE TO 2019 NOVEL CORONAVIRUS: ICD-10-CM

## 2024-09-12 DIAGNOSIS — R06.02 SHORTNESS OF BREATH: ICD-10-CM

## 2024-09-12 DIAGNOSIS — R53.1 GENERALIZED WEAKNESS: ICD-10-CM

## 2024-09-12 LAB
ANION GAP SERPL CALCULATED.3IONS-SCNC: 16 MMOL/L (ref 7–15)
ATRIAL RATE - MUSE: 394 BPM
BASOPHILS # BLD AUTO: 0 10E3/UL (ref 0–0.2)
BASOPHILS NFR BLD AUTO: 0 %
BUN SERPL-MCNC: 12.9 MG/DL (ref 8–23)
CALCIUM SERPL-MCNC: 8.8 MG/DL (ref 8.8–10.4)
CHLORIDE SERPL-SCNC: 97 MMOL/L (ref 98–107)
CREAT SERPL-MCNC: 0.68 MG/DL (ref 0.51–0.95)
DIASTOLIC BLOOD PRESSURE - MUSE: 74 MMHG
EGFRCR SERPLBLD CKD-EPI 2021: 86 ML/MIN/1.73M2
EOSINOPHIL # BLD AUTO: 0 10E3/UL (ref 0–0.7)
EOSINOPHIL NFR BLD AUTO: 0 %
ERYTHROCYTE [DISTWIDTH] IN BLOOD BY AUTOMATED COUNT: 14.6 % (ref 10–15)
GLUCOSE SERPL-MCNC: 83 MG/DL (ref 70–99)
HCO3 SERPL-SCNC: 24 MMOL/L (ref 22–29)
HCT VFR BLD AUTO: 38.1 % (ref 35–47)
HGB BLD-MCNC: 12.4 G/DL (ref 11.7–15.7)
IMM GRANULOCYTES # BLD: 0 10E3/UL
IMM GRANULOCYTES NFR BLD: 0 %
INTERPRETATION ECG - MUSE: NORMAL
LYMPHOCYTES # BLD AUTO: 0.8 10E3/UL (ref 0.8–5.3)
LYMPHOCYTES NFR BLD AUTO: 12 %
MCH RBC QN AUTO: 29.7 PG (ref 26.5–33)
MCHC RBC AUTO-ENTMCNC: 32.5 G/DL (ref 31.5–36.5)
MCV RBC AUTO: 91 FL (ref 78–100)
MONOCYTES # BLD AUTO: 0.5 10E3/UL (ref 0–1.3)
MONOCYTES NFR BLD AUTO: 7 %
NEUTROPHILS # BLD AUTO: 5.6 10E3/UL (ref 1.6–8.3)
NEUTROPHILS NFR BLD AUTO: 81 %
NRBC # BLD AUTO: 0 10E3/UL
NRBC BLD AUTO-RTO: 0 /100
P AXIS - MUSE: NORMAL DEGREES
PLATELET # BLD AUTO: 182 10E3/UL (ref 150–450)
POTASSIUM SERPL-SCNC: 5 MMOL/L (ref 3.4–5.3)
PR INTERVAL - MUSE: NORMAL MS
QRS DURATION - MUSE: 80 MS
QT - MUSE: 394 MS
QTC - MUSE: 474 MS
R AXIS - MUSE: 55 DEGREES
RBC # BLD AUTO: 4.18 10E6/UL (ref 3.8–5.2)
SODIUM SERPL-SCNC: 137 MMOL/L (ref 135–145)
SYSTOLIC BLOOD PRESSURE - MUSE: 122 MMHG
T AXIS - MUSE: 5 DEGREES
VENTRICULAR RATE- MUSE: 87 BPM
WBC # BLD AUTO: 6.9 10E3/UL (ref 4–11)

## 2024-09-12 PROCEDURE — 99285 EMERGENCY DEPT VISIT HI MDM: CPT | Mod: 25

## 2024-09-12 PROCEDURE — 96360 HYDRATION IV INFUSION INIT: CPT

## 2024-09-12 PROCEDURE — 80048 BASIC METABOLIC PNL TOTAL CA: CPT | Performed by: EMERGENCY MEDICINE

## 2024-09-12 PROCEDURE — 93005 ELECTROCARDIOGRAM TRACING: CPT | Performed by: EMERGENCY MEDICINE

## 2024-09-12 PROCEDURE — 258N000003 HC RX IP 258 OP 636: Performed by: EMERGENCY MEDICINE

## 2024-09-12 PROCEDURE — 71045 X-RAY EXAM CHEST 1 VIEW: CPT

## 2024-09-12 PROCEDURE — 36415 COLL VENOUS BLD VENIPUNCTURE: CPT | Performed by: EMERGENCY MEDICINE

## 2024-09-12 PROCEDURE — 85004 AUTOMATED DIFF WBC COUNT: CPT | Performed by: EMERGENCY MEDICINE

## 2024-09-12 RX ADMIN — SODIUM CHLORIDE 1000 ML: 9 INJECTION, SOLUTION INTRAVENOUS at 16:24

## 2024-09-12 ASSESSMENT — COLUMBIA-SUICIDE SEVERITY RATING SCALE - C-SSRS
2. HAVE YOU ACTUALLY HAD ANY THOUGHTS OF KILLING YOURSELF IN THE PAST MONTH?: NO
6. HAVE YOU EVER DONE ANYTHING, STARTED TO DO ANYTHING, OR PREPARED TO DO ANYTHING TO END YOUR LIFE?: NO
1. IN THE PAST MONTH, HAVE YOU WISHED YOU WERE DEAD OR WISHED YOU COULD GO TO SLEEP AND NOT WAKE UP?: NO

## 2024-09-12 ASSESSMENT — ACTIVITIES OF DAILY LIVING (ADL)
ADLS_ACUITY_SCORE: 38
ADLS_ACUITY_SCORE: 42

## 2024-09-12 NOTE — DISCHARGE INSTRUCTIONS
You were seen in the emergency department for shortness of breath.  Your evaluation included an x-ray, lab testing, and an EKG.  Your testing here has looked encouraging so far.  We think that your symptoms do seem to be related to COVID illness without any complications that we have identified.  Specifically your x-ray looked clear of any signs of pneumonia and your other lab tests looked good.  He received some IV fluids here.  You can continue on all of your important medications including your blood thinner and your prednisone.  Please try to make sure you are drinking plenty of liquids to stay hydrated.  If you have other concerns like severe shortness of breath at rest, oxygen levels less than 90%, severe weakness or intractable vomiting we can reevaluate anytime in the emergency department.  We do expect symptoms will likely improve over the next week.  If there are any lingering concerns after this emergency department visit we would recommend follow-up with your primary.

## 2024-09-12 NOTE — ED PROVIDER NOTES
EMERGENCY DEPARTMENT ENCOUNTER      NAME: Lilian La  AGE: 84 year old female  YOB: 1940  MRN: [id number]  EVALUATION DATE & TIME: 2024  3:19 PM    PCP: Francisco J Vergara    ED PROVIDER: Jorge Wylie M.D.      Chief Complaint   Patient presents with    Shortness of Breath         FINAL IMPRESSION:  1. Infection due to 2019 novel coronavirus    2. Generalized weakness    3. Shortness of breath          ED COURSE & MEDICAL DECISION MAKIN year old female presents to the Emergency Department for evaluation of shortness of breath and generalized weakness.  Patient has had about 5 days of COVID-19 symptoms including shortness of breath generalized weakness and nausea with occasional loose stool.  She arrives to the emergency department vitally stable.  She is in chronic atrial fibrillation.  She is anticoagulated for this.  Heart rates are appropriate.  She is maintaining oximetry in the upper 90s on room air and not having any labored breathing at rest.  She underwent a lab evaluation which reveals a stable white blood cell count and stable metabolic profile.  She received some IV fluids here.  Chest x-ray shows no evidence of consolidations.  As she is already anticoagulated for chronic A-fib I have no strong suspicion for any superimposed pulmonary embolism requiring further workup in that regard.  Despite reported weakness, nursing reports she has been able to easily get up out of bed, ambulate independently to the bathroom here, seems steady on her feet and not in need of extensive assistance at this time.  Her son joined her a little while after she arrived here.  After some shared decision-making with the family, I think there would be fairly limited benefit to hospitalization at this time.  Given duration of symptoms as well as other important medications including Eliquis, I do not think there is an indication for Paxlovid administration.  Patient was comfortable with  continuing to monitor and manage things at home for now.  She is going to try to get some additional assistance from family and neighbors.  Hospital return precautions were reviewed.  Clinic follow-up was recommended for any lingering concerns.    At the conclusion of the encounter I discussed the results of all of the tests and the disposition. The questions were answered. The patient or family acknowledged understanding and was agreeable with the care plan.       Medical Decision Making  Obtained supplemental history:Supplemental history obtained?: No  Reviewed external records: External records reviewed?: No  Care impacted by chronic illness:Anticoagulated State and Heart Disease  Care significantly affected by social determinants of health:No Support for Care at Home  Did you consider but not order tests?: Work up considered but not performed and documented in chart, if applicable  Did you interpret images independently?: Independent interpretation of ECG and images noted in documentation, when applicable.  Consultation discussion with other provider:Did you involve another provider (consultant, MH, pharmacy, etc.)?: No  Discharge. No recommendations on prescription strength medication(s). I considered admission, but discharged the patient after share decision making conversation.        MEDICATIONS GIVEN IN THE EMERGENCY:  Medications   sodium chloride 0.9% BOLUS 1,000 mL (0 mLs Intravenous Stopped 9/12/24 1723)       NEW PRESCRIPTIONS STARTED AT TODAY'S ER VISIT  Discharge Medication List as of 9/12/2024  5:24 PM             =================================================================    HPI    Patient information was obtained from: Patient      Lilian La is a 84 year old female who presents to this ED today for evaluation of shortness of breath.  Patient has been sick for about 5 days.  She has had cough, intermittent loose stools, and increasing shortness of breath especially with exertion.  She  lives at home by herself.  She has had some mild cough during this time, nothing productive.  She is feeling generalized bodyaches back pain and leg pain.  She tested positive for COVID-19 on 9/10.  She reports increasing shortness of breath today, she called the triage line and was instructed to call  EMS for hospital evaluation.  She reports no chest pain.  Denies any pre-existing lung conditions although does have history of atrial fibrillation and is anticoagulated on Eliquis.      REVIEW OF SYSTEMS   All systems reviewed and negative except as noted in HPI.    PAST MEDICAL HISTORY:  No past medical history on file.    PAST SURGICAL HISTORY:  No past surgical history on file.        CURRENT MEDICATIONS:    No current facility-administered medications for this encounter.     Current Outpatient Medications   Medication Sig Dispense Refill    abatacept (ORENCIA) 250 MG injection Inject 750 mg into the vein every 28 days      acetaminophen (TYLENOL) 500 MG tablet Take 1,000 mg by mouth At Bedtime      apixaban ANTICOAGULANT (ELIQUIS) 5 MG tablet Take 5 mg by mouth 2 times daily      Calcium Carb-Cholecalciferol 600-20 MG-MCG TABS Take 1 tablet by mouth daily      ciprofloxacin (CIPRO) 500 MG tablet Take 1 tablet (500 mg) by mouth 2 times daily 10 tablet 0    cyanocobalamin (VITAMIN B-12) 500 MCG SUBL sublingual tablet Place 500 mcg under the tongue every morning      denosumab (PROLIA) 60 MG/ML SOSY injection Inject 60 mg Subcutaneous every 6 months      diclofenac (VOLTAREN) 1 % topical gel Apply 2 g topically 4 times daily as needed      diphenoxylate-atropine (LOMOTIL) 2.5-0.025 MG tablet Take 1 tablet by mouth 4 times daily as needed for diarrhea 28 tablet 0    diphenoxylate-atropine (LOMOTIL) 2.5-0.025 MG tablet Take 1 tablet by mouth 4 times daily as needed      glucosamine-chondroitin 500-400 MG CAPS per capsule Take 2 capsules by mouth every morning      LANsoprazole (PREVACID) 15 MG DR capsule Take 15 mg by  "mouth every morning      lisinopril (ZESTRIL) 5 MG tablet Take 1 tablet (5 mg) by mouth daily 30 tablet 0    metoprolol succinate ER (TOPROL XL) 50 MG 24 hr tablet Take 50 mg by mouth At Bedtime      predniSONE (DELTASONE) 5 MG tablet Take 10 mg by mouth every morning      sertraline (ZOLOFT) 50 MG tablet Take 50 mg by mouth every morning      traZODone (DESYREL) 50 MG tablet Take 50 mg by mouth At Bedtime      vancomycin (VANCOCIN) 125 MG capsule Take 1 capsule (125 mg) by mouth daily 5 capsule 0         ALLERGIES:  Allergies   Allergen Reactions    Sulfa Antibiotics Other (See Comments)     Passed out. Occurred in childhood       FAMILY HISTORY:  No family history on file.    SOCIAL HISTORY:   Social History     Socioeconomic History    Marital status:        VITALS:  BP (!) 141/65   Pulse 85   Temp 98.3  F (36.8  C) (Oral)   Resp 20   Ht 1.702 m (5' 7\")   Wt 60.3 kg (133 lb)   SpO2 97%   BMI 20.83 kg/m      PHYSICAL EXAM    Constitutional: Well developed, Well nourished, NAD.  HENT: Normocephalic, Atraumatic. Neck Supple.  Eyes: EOMI, Conjunctiva normal.  Respiratory: Breathing comfortably on room air. Speaks full sentences easily. Lungs clear to ascultation.  Cardiovascular: Normal heart rate, Regular rhythm. No peripheral edema.  Abdomen: Soft, nontender  Musculoskeletal: Good range of motion in all major joints. No major deformities noted.  Integument: Warm, Dry.  Neurologic: Alert & awake, Normal motor function, Normal sensory function, No focal deficits noted.   Psychiatric: Cooperative. Affect appropriate.     LAB:  All pertinent labs reviewed and interpreted.  Labs Ordered and Resulted from Time of ED Arrival to Time of ED Departure   BASIC METABOLIC PANEL - Abnormal       Result Value    Sodium 137      Potassium 5.0      Chloride 97 (*)     Carbon Dioxide (CO2) 24      Anion Gap 16 (*)     Urea Nitrogen 12.9      Creatinine 0.68      GFR Estimate 86      Calcium 8.8      Glucose 83     CBC " WITH PLATELETS AND DIFFERENTIAL    WBC Count 6.9      RBC Count 4.18      Hemoglobin 12.4      Hematocrit 38.1      MCV 91      MCH 29.7      MCHC 32.5      RDW 14.6      Platelet Count 182      % Neutrophils 81      % Lymphocytes 12      % Monocytes 7      % Eosinophils 0      % Basophils 0      % Immature Granulocytes 0      NRBCs per 100 WBC 0      Absolute Neutrophils 5.6      Absolute Lymphocytes 0.8      Absolute Monocytes 0.5      Absolute Eosinophils 0.0      Absolute Basophils 0.0      Absolute Immature Granulocytes 0.0      Absolute NRBCs 0.0         RADIOLOGY:  Reviewed all pertinent imaging. Please see official radiology report.  XR Chest Port 1 View   Final Result   IMPRESSION: Negative chest.          EKG:    Performed at: 1656    Impression: Atrial fibrillation, rate controlled, nonspecific ST-T wave abnormality    Rate: 87  Rhythm: Afib  Axis: Normal  NV Interval: NA  QRS Interval: 80  QTc Interval: 474  ST Changes: Nonspecific ST-T wave abnormality  Comparison: Compared to September 3, 2021, atrial fibrillation has replaced atrial flutter, no other significant change    I have independently reviewed and interpreted the EKG(s) documented above.        Jorge Wylie M.D.  Emergency Medicine  [facility] EMERGENCY ROOM  [address] [id number] [phone]  Dept: [phone]       Jorge Wylie MD  09/13/24 0989

## 2024-09-12 NOTE — ED TRIAGE NOTES
"PT is coming in via EMS from [facility]. EMS was called by PT for feeling SOB and dizzy while walking around her house. PT was seen yesterday at  for SOB and was Dx with COVID. PT is on room air, VSS, was able to walk from stretcher to bed without any issues including decrease in O2. EMS stated that Pt was up and walking around her house when they arrived again VSS and no decrease or increase in work of breathing that they noted. PT states states that she has been having diarrhea and \"I feel dizzy when I get up\" but has been eating and drinking ok at home.      Triage Assessment (Adult)       Row Name 09/12/24 1525          Triage Assessment    Airway WDL WDL        Respiratory WDL    Respiratory WDL rhythm/pattern     Rhythm/Pattern, Respiratory shortness of breath        Skin Circulation/Temperature WDL    Skin Circulation/Temperature WDL WDL        Cardiac WDL    Cardiac WDL WDL        Peripheral/Neurovascular WDL    Peripheral Neurovascular WDL WDL        Cognitive/Neuro/Behavioral WDL    Cognitive/Neuro/Behavioral WDL WDL                     "

## 2025-02-25 ENCOUNTER — HOSPITAL ENCOUNTER (OUTPATIENT)
Facility: CLINIC | Age: 85
Setting detail: OBSERVATION
End: 2025-02-25
Attending: EMERGENCY MEDICINE | Admitting: FAMILY MEDICINE
Payer: COMMERCIAL

## 2025-02-25 ENCOUNTER — APPOINTMENT (OUTPATIENT)
Dept: CT IMAGING | Facility: CLINIC | Age: 85
End: 2025-02-25
Attending: EMERGENCY MEDICINE
Payer: COMMERCIAL

## 2025-02-25 DIAGNOSIS — M62.81 GENERALIZED MUSCLE WEAKNESS: Primary | ICD-10-CM

## 2025-02-25 DIAGNOSIS — R11.2 NAUSEA VOMITING AND DIARRHEA: ICD-10-CM

## 2025-02-25 DIAGNOSIS — K59.00 CONSTIPATION, UNSPECIFIED CONSTIPATION TYPE: ICD-10-CM

## 2025-02-25 DIAGNOSIS — N39.0 URINARY TRACT INFECTION WITHOUT HEMATURIA, SITE UNSPECIFIED: ICD-10-CM

## 2025-02-25 DIAGNOSIS — I48.91 ATRIAL FIBRILLATION WITH RAPID VENTRICULAR RESPONSE (H): ICD-10-CM

## 2025-02-25 DIAGNOSIS — R19.7 NAUSEA VOMITING AND DIARRHEA: ICD-10-CM

## 2025-02-25 DIAGNOSIS — I51.89 DIASTOLIC DYSFUNCTION: ICD-10-CM

## 2025-02-25 LAB
ALBUMIN SERPL BCG-MCNC: 4.1 G/DL (ref 3.5–5.2)
ALBUMIN UR-MCNC: 100 MG/DL
ALP SERPL-CCNC: 112 U/L (ref 40–150)
ALT SERPL W P-5'-P-CCNC: 13 U/L (ref 0–50)
ANION GAP SERPL CALCULATED.3IONS-SCNC: 15 MMOL/L (ref 7–15)
APPEARANCE UR: ABNORMAL
APTT PPP: 38 SECONDS (ref 22–38)
AST SERPL W P-5'-P-CCNC: 26 U/L (ref 0–45)
ATRIAL RATE - MUSE: 374 BPM
BACTERIA #/AREA URNS HPF: ABNORMAL /HPF
BASOPHILS # BLD AUTO: 0 10E3/UL (ref 0–0.2)
BASOPHILS NFR BLD AUTO: 0 %
BILIRUB DIRECT SERPL-MCNC: 0.73 MG/DL (ref 0–0.3)
BILIRUB SERPL-MCNC: 2.4 MG/DL
BILIRUB UR QL STRIP: NEGATIVE
BUN SERPL-MCNC: 19.9 MG/DL (ref 8–23)
CALCIUM SERPL-MCNC: 9.9 MG/DL (ref 8.8–10.4)
CHLORIDE SERPL-SCNC: 97 MMOL/L (ref 98–107)
COLOR UR AUTO: YELLOW
CREAT SERPL-MCNC: 0.84 MG/DL (ref 0.51–0.95)
DIASTOLIC BLOOD PRESSURE - MUSE: NORMAL MMHG
EGFRCR SERPLBLD CKD-EPI 2021: 69 ML/MIN/1.73M2
EOSINOPHIL # BLD AUTO: 0 10E3/UL (ref 0–0.7)
EOSINOPHIL NFR BLD AUTO: 0 %
ERYTHROCYTE [DISTWIDTH] IN BLOOD BY AUTOMATED COUNT: 13.6 % (ref 10–15)
FLUAV RNA SPEC QL NAA+PROBE: NEGATIVE
FLUBV RNA RESP QL NAA+PROBE: NEGATIVE
GLUCOSE SERPL-MCNC: 132 MG/DL (ref 70–99)
GLUCOSE UR STRIP-MCNC: NEGATIVE MG/DL
HCO3 SERPL-SCNC: 25 MMOL/L (ref 22–29)
HCT VFR BLD AUTO: 35.7 % (ref 35–47)
HGB BLD-MCNC: 11.9 G/DL (ref 11.7–15.7)
HGB UR QL STRIP: ABNORMAL
IMM GRANULOCYTES # BLD: 0.1 10E3/UL
IMM GRANULOCYTES NFR BLD: 1 %
INR PPP: 1.24 (ref 0.85–1.15)
INTERPRETATION ECG - MUSE: NORMAL
KETONES UR STRIP-MCNC: 10 MG/DL
LACTATE SERPL-SCNC: 1.8 MMOL/L (ref 0.7–2)
LACTATE SERPL-SCNC: 2.8 MMOL/L (ref 0.7–2)
LEUKOCYTE ESTERASE UR QL STRIP: ABNORMAL
LIPASE SERPL-CCNC: 14 U/L (ref 13–60)
LYMPHOCYTES # BLD AUTO: 0.7 10E3/UL (ref 0.8–5.3)
LYMPHOCYTES NFR BLD AUTO: 5 %
MAGNESIUM SERPL-MCNC: 1.7 MG/DL (ref 1.7–2.3)
MCH RBC QN AUTO: 31.6 PG (ref 26.5–33)
MCHC RBC AUTO-ENTMCNC: 33.3 G/DL (ref 31.5–36.5)
MCV RBC AUTO: 95 FL (ref 78–100)
MONOCYTES # BLD AUTO: 1.3 10E3/UL (ref 0–1.3)
MONOCYTES NFR BLD AUTO: 9 %
MUCOUS THREADS #/AREA URNS LPF: PRESENT /LPF
NEUTROPHILS # BLD AUTO: 12.2 10E3/UL (ref 1.6–8.3)
NEUTROPHILS NFR BLD AUTO: 85 %
NITRATE UR QL: POSITIVE
NRBC # BLD AUTO: 0 10E3/UL
NRBC BLD AUTO-RTO: 0 /100
P AXIS - MUSE: NORMAL DEGREES
PH UR STRIP: 5.5 [PH] (ref 5–7)
PLATELET # BLD AUTO: 156 10E3/UL (ref 150–450)
POTASSIUM SERPL-SCNC: 3.7 MMOL/L (ref 3.4–5.3)
PR INTERVAL - MUSE: NORMAL MS
PROT SERPL-MCNC: 7.3 G/DL (ref 6.4–8.3)
QRS DURATION - MUSE: 80 MS
QT - MUSE: 326 MS
QTC - MUSE: 475 MS
R AXIS - MUSE: 56 DEGREES
RBC # BLD AUTO: 3.77 10E6/UL (ref 3.8–5.2)
RBC URINE: 10 /HPF
RSV RNA SPEC NAA+PROBE: NEGATIVE
SARS-COV-2 RNA RESP QL NAA+PROBE: NEGATIVE
SODIUM SERPL-SCNC: 137 MMOL/L (ref 135–145)
SP GR UR STRIP: 1.01 (ref 1–1.03)
SQUAMOUS EPITHELIAL: 1 /HPF
SYSTOLIC BLOOD PRESSURE - MUSE: NORMAL MMHG
T AXIS - MUSE: -12 DEGREES
UROBILINOGEN UR STRIP-MCNC: <2 MG/DL
VENTRICULAR RATE- MUSE: 128 BPM
WBC # BLD AUTO: 14.3 10E3/UL (ref 4–11)
WBC CLUMPS #/AREA URNS HPF: PRESENT /HPF
WBC URINE: >182 /HPF

## 2025-02-25 PROCEDURE — 250N000011 HC RX IP 250 OP 636: Performed by: EMERGENCY MEDICINE

## 2025-02-25 PROCEDURE — 85048 AUTOMATED LEUKOCYTE COUNT: CPT | Performed by: EMERGENCY MEDICINE

## 2025-02-25 PROCEDURE — 96375 TX/PRO/DX INJ NEW DRUG ADDON: CPT

## 2025-02-25 PROCEDURE — 36415 COLL VENOUS BLD VENIPUNCTURE: CPT | Performed by: EMERGENCY MEDICINE

## 2025-02-25 PROCEDURE — 250N000013 HC RX MED GY IP 250 OP 250 PS 637: Performed by: EMERGENCY MEDICINE

## 2025-02-25 PROCEDURE — 85610 PROTHROMBIN TIME: CPT | Performed by: EMERGENCY MEDICINE

## 2025-02-25 PROCEDURE — 82374 ASSAY BLOOD CARBON DIOXIDE: CPT | Performed by: EMERGENCY MEDICINE

## 2025-02-25 PROCEDURE — 250N000009 HC RX 250: Performed by: EMERGENCY MEDICINE

## 2025-02-25 PROCEDURE — 83735 ASSAY OF MAGNESIUM: CPT | Performed by: EMERGENCY MEDICINE

## 2025-02-25 PROCEDURE — 258N000003 HC RX IP 258 OP 636: Performed by: FAMILY MEDICINE

## 2025-02-25 PROCEDURE — 84155 ASSAY OF PROTEIN SERUM: CPT | Performed by: EMERGENCY MEDICINE

## 2025-02-25 PROCEDURE — 81003 URINALYSIS AUTO W/O SCOPE: CPT | Performed by: EMERGENCY MEDICINE

## 2025-02-25 PROCEDURE — 85730 THROMBOPLASTIN TIME PARTIAL: CPT | Performed by: EMERGENCY MEDICINE

## 2025-02-25 PROCEDURE — 83605 ASSAY OF LACTIC ACID: CPT | Performed by: EMERGENCY MEDICINE

## 2025-02-25 PROCEDURE — 74177 CT ABD & PELVIS W/CONTRAST: CPT

## 2025-02-25 PROCEDURE — 250N000012 HC RX MED GY IP 250 OP 636 PS 637: Performed by: FAMILY MEDICINE

## 2025-02-25 PROCEDURE — 99285 EMERGENCY DEPT VISIT HI MDM: CPT | Mod: 25

## 2025-02-25 PROCEDURE — 258N000003 HC RX IP 258 OP 636: Performed by: EMERGENCY MEDICINE

## 2025-02-25 PROCEDURE — 96365 THER/PROPH/DIAG IV INF INIT: CPT | Mod: 59

## 2025-02-25 PROCEDURE — 96361 HYDRATE IV INFUSION ADD-ON: CPT

## 2025-02-25 PROCEDURE — 93005 ELECTROCARDIOGRAM TRACING: CPT | Performed by: EMERGENCY MEDICINE

## 2025-02-25 PROCEDURE — 85004 AUTOMATED DIFF WBC COUNT: CPT | Performed by: EMERGENCY MEDICINE

## 2025-02-25 PROCEDURE — 83690 ASSAY OF LIPASE: CPT | Performed by: EMERGENCY MEDICINE

## 2025-02-25 PROCEDURE — 87637 SARSCOV2&INF A&B&RSV AMP PRB: CPT | Performed by: EMERGENCY MEDICINE

## 2025-02-25 PROCEDURE — 87186 SC STD MICRODIL/AGAR DIL: CPT | Performed by: EMERGENCY MEDICINE

## 2025-02-25 PROCEDURE — 250N000013 HC RX MED GY IP 250 OP 250 PS 637: Performed by: STUDENT IN AN ORGANIZED HEALTH CARE EDUCATION/TRAINING PROGRAM

## 2025-02-25 PROCEDURE — 250N000013 HC RX MED GY IP 250 OP 250 PS 637: Performed by: FAMILY MEDICINE

## 2025-02-25 PROCEDURE — 120N000001 HC R&B MED SURG/OB

## 2025-02-25 PROCEDURE — 82310 ASSAY OF CALCIUM: CPT | Performed by: EMERGENCY MEDICINE

## 2025-02-25 PROCEDURE — 99222 1ST HOSP IP/OBS MODERATE 55: CPT | Performed by: FAMILY MEDICINE

## 2025-02-25 RX ORDER — HYDROXYZINE HYDROCHLORIDE 10 MG/1
10 TABLET, FILM COATED ORAL 3 TIMES DAILY PRN
Status: ON HOLD | COMMUNITY
Start: 2024-10-15

## 2025-02-25 RX ORDER — CEFTRIAXONE 1 G/1
1 INJECTION, POWDER, FOR SOLUTION INTRAMUSCULAR; INTRAVENOUS EVERY 24 HOURS
Status: DISPENSED | OUTPATIENT
Start: 2025-02-26

## 2025-02-25 RX ORDER — SODIUM CHLORIDE 9 MG/ML
INJECTION, SOLUTION INTRAVENOUS CONTINUOUS
Status: DISCONTINUED | OUTPATIENT
Start: 2025-02-25 | End: 2025-02-27

## 2025-02-25 RX ORDER — PROCHLORPERAZINE MALEATE 5 MG/1
5 TABLET ORAL EVERY 6 HOURS PRN
Status: ACTIVE | OUTPATIENT
Start: 2025-02-25

## 2025-02-25 RX ORDER — ONDANSETRON 4 MG/1
4 TABLET, ORALLY DISINTEGRATING ORAL EVERY 6 HOURS PRN
Status: DISPENSED | OUTPATIENT
Start: 2025-02-25

## 2025-02-25 RX ORDER — TRAZODONE HYDROCHLORIDE 50 MG/1
50 TABLET ORAL AT BEDTIME
Status: DISPENSED | OUTPATIENT
Start: 2025-02-25

## 2025-02-25 RX ORDER — IOPAMIDOL 755 MG/ML
90 INJECTION, SOLUTION INTRAVASCULAR ONCE
Status: COMPLETED | OUTPATIENT
Start: 2025-02-25 | End: 2025-02-25

## 2025-02-25 RX ORDER — PREDNISONE 1 MG/1
6 TABLET ORAL DAILY
Status: ON HOLD | COMMUNITY
Start: 2024-07-03

## 2025-02-25 RX ORDER — LIDOCAINE 40 MG/G
CREAM TOPICAL
Status: ACTIVE | OUTPATIENT
Start: 2025-02-25

## 2025-02-25 RX ORDER — HYDROXYZINE HYDROCHLORIDE 10 MG/1
10 TABLET, FILM COATED ORAL 3 TIMES DAILY PRN
Status: ACTIVE | OUTPATIENT
Start: 2025-02-25

## 2025-02-25 RX ORDER — ONDANSETRON 2 MG/ML
4 INJECTION INTRAMUSCULAR; INTRAVENOUS ONCE
Status: COMPLETED | OUTPATIENT
Start: 2025-02-25 | End: 2025-02-25

## 2025-02-25 RX ORDER — CEFTRIAXONE 1 G/1
1 INJECTION, POWDER, FOR SOLUTION INTRAMUSCULAR; INTRAVENOUS ONCE
Status: DISCONTINUED | OUTPATIENT
Start: 2025-02-25 | End: 2025-02-25

## 2025-02-25 RX ORDER — METOPROLOL SUCCINATE 25 MG/1
50 TABLET, EXTENDED RELEASE ORAL AT BEDTIME
Status: DISPENSED | OUTPATIENT
Start: 2025-02-25

## 2025-02-25 RX ORDER — ACETAMINOPHEN 325 MG/1
975 TABLET ORAL ONCE
Status: COMPLETED | OUTPATIENT
Start: 2025-02-25 | End: 2025-02-25

## 2025-02-25 RX ORDER — METOPROLOL TARTRATE 1 MG/ML
5 INJECTION, SOLUTION INTRAVENOUS ONCE
Status: COMPLETED | OUTPATIENT
Start: 2025-02-25 | End: 2025-02-25

## 2025-02-25 RX ORDER — CEFTRIAXONE 1 G/1
1 INJECTION, POWDER, FOR SOLUTION INTRAMUSCULAR; INTRAVENOUS ONCE
Status: COMPLETED | OUTPATIENT
Start: 2025-02-25 | End: 2025-02-25

## 2025-02-25 RX ORDER — ONDANSETRON 2 MG/ML
4 INJECTION INTRAMUSCULAR; INTRAVENOUS EVERY 6 HOURS PRN
Status: ACTIVE | OUTPATIENT
Start: 2025-02-25

## 2025-02-25 RX ORDER — MULTIVITAMIN WITH IRON
500 TABLET ORAL EVERY MORNING
Status: DISPENSED | OUTPATIENT
Start: 2025-02-26

## 2025-02-25 RX ADMIN — SODIUM CHLORIDE 1000 ML: 0.9 INJECTION, SOLUTION INTRAVENOUS at 15:23

## 2025-02-25 RX ADMIN — ACETAMINOPHEN 975 MG: 325 TABLET ORAL at 18:33

## 2025-02-25 RX ADMIN — METOPROLOL SUCCINATE 50 MG: 25 TABLET, EXTENDED RELEASE ORAL at 22:05

## 2025-02-25 RX ADMIN — SODIUM CHLORIDE: 0.9 INJECTION, SOLUTION INTRAVENOUS at 20:40

## 2025-02-25 RX ADMIN — SODIUM CHLORIDE 1000 ML: 0.9 INJECTION, SOLUTION INTRAVENOUS at 17:27

## 2025-02-25 RX ADMIN — CEFTRIAXONE 1 G: 1 INJECTION, POWDER, FOR SOLUTION INTRAMUSCULAR; INTRAVENOUS at 18:01

## 2025-02-25 RX ADMIN — APIXABAN 5 MG: 5 TABLET, FILM COATED ORAL at 20:40

## 2025-02-25 RX ADMIN — IOPAMIDOL 90 ML: 755 INJECTION, SOLUTION INTRAVENOUS at 16:26

## 2025-02-25 RX ADMIN — ONDANSETRON 4 MG: 2 INJECTION, SOLUTION INTRAMUSCULAR; INTRAVENOUS at 15:25

## 2025-02-25 RX ADMIN — PROCHLORPERAZINE EDISYLATE 5 MG: 5 INJECTION INTRAMUSCULAR; INTRAVENOUS at 17:16

## 2025-02-25 RX ADMIN — PREDNISONE 6 MG: 1 TABLET ORAL at 22:05

## 2025-02-25 RX ADMIN — TRAZODONE HYDROCHLORIDE 50 MG: 50 TABLET ORAL at 22:05

## 2025-02-25 RX ADMIN — METOROPROLOL TARTRATE 5 MG: 5 INJECTION, SOLUTION INTRAVENOUS at 18:21

## 2025-02-25 ASSESSMENT — ACTIVITIES OF DAILY LIVING (ADL)
ADLS_ACUITY_SCORE: 60
ADLS_ACUITY_SCORE: 60
ADLS_ACUITY_SCORE: 69
ADLS_ACUITY_SCORE: 60
ADLS_ACUITY_SCORE: 69

## 2025-02-25 ASSESSMENT — COLUMBIA-SUICIDE SEVERITY RATING SCALE - C-SSRS
1. IN THE PAST MONTH, HAVE YOU WISHED YOU WERE DEAD OR WISHED YOU COULD GO TO SLEEP AND NOT WAKE UP?: NO
2. HAVE YOU ACTUALLY HAD ANY THOUGHTS OF KILLING YOURSELF IN THE PAST MONTH?: NO
6. HAVE YOU EVER DONE ANYTHING, STARTED TO DO ANYTHING, OR PREPARED TO DO ANYTHING TO END YOUR LIFE?: NO

## 2025-02-25 NOTE — ED TRIAGE NOTES
Patient presents to ED with N/V/D that began yesterday morning, patient having abdominal pain, dysuria, hx of A Fib, and having weakness.  Jennifer Quinones RN.......2/25/2025 3:02 PM     Triage Assessment (Adult)       Row Name 02/25/25 1501          Triage Assessment    Airway WDL WDL        Respiratory WDL    Respiratory WDL WDL        Skin Circulation/Temperature WDL    Skin Circulation/Temperature WDL WDL        Cardiac WDL    Cardiac WDL X;rhythm     Cardiac Rhythm Atrial fibrillation        Peripheral/Neurovascular WDL    Peripheral Neurovascular WDL WDL        Cognitive/Neuro/Behavioral WDL    Cognitive/Neuro/Behavioral WDL WDL

## 2025-02-25 NOTE — ED PROVIDER NOTES
EMERGENCY DEPARTMENT ENCOUNTER      NAME: Lilian La  AGE: 84 year old female  YOB: 1940  MRN: [id number]  EVALUATION DATE & TIME: 2/25/2025  2:57 PM    PCP: Francisco J Vergara    ED PROVIDER: Munira Rosales DO      Chief Complaint   Patient presents with    Nausea, Vomiting, & Diarrhea    Generalized Weakness    Dysuria         FINAL IMPRESSION:  1. Nausea vomiting and diarrhea    2. Urinary tract infection without hematuria, site unspecified    3. Atrial fibrillation with rapid ventricular response (H)          ED COURSE & MEDICAL DECISION MAKING:    Pertinent Labs & Imaging studies reviewed. (See chart for details)  2:59 PM I met the patient and performed my initial interview and exam.  4:42 PM I went to go check in on the patient and update them on the current plan for their care. She reported on going nausea, hip/shoulder pain from arthritis, and no abdominal pain.   6:07 PM I went to go check in on the patient. She informed me that she is still pretty nauseous and is agreeable to admission.  6:14 PM Transfer order placed in system.  6:30 PM Per SOC, may be a bed at [facility].  I discussed with patient and son and they decline transfer to either site.  Will board here.  6:50 PM I spoke with the hospitalist, Dr. Del Rio, about the patient.    84 year old female presents to the Emergency Department for evaluation of nausea, vomiting, diarrhea, weakness.  Symptoms started 1 day ago.  Patient with history atrial fibrillation.  No report of falls.  No changes in medications.  Afebrile.  Notes some generalized abdominal pain and back pain.  Patient is ill-appearing on arrival, tachycardic and vomiting throughout exam.  No blood in emesis.  We evaluate for signs of electrolyte abnormality, infection, bowel obstruction among others.  Not consistent with ACS or PE.  Labs with mild leukocytosis and lactic acidosis.  She is afebrile.  Patient reassessed after fluids and antiemetics.   Feeling improved with ongoing nausea.  Denies any ongoing abdominal pain, reports has pain to shoulder and hips that is chronic for her and she relates to arthritis.  Not consistent with a septic joint at this time.  CT imaging of the abdomen pelvis without evidence of obstruction, appendicitis, cholecystitis, perforation among others.  Questionable cystitis.  Urinalysis obtained and is consistent with infection.  Prior urine culture resulted from 2 years ago which showed pansensitive E. coli.  She was given ceftriaxone here.  Patient persistently tachycardic, nauseous and I have concerns about her ability to keep down her medications specifically her anticoagulation for her chronic A-fib and metoprolol.  Given UTI, persistent nausea and atrial fibrillation with RVR, will admit.  Given IV metoprolol with improvement in her HR.    At the conclusion of the encounter I discussed the results of all of the tests and the disposition. The questions were answered. The patient or family acknowledged understanding and was agreeable with the care plan.     Medical Decision Making  Obtained supplemental history:Supplemental history obtained?: Documented in chart and Family Member/Significant Other  Reviewed external records: External records reviewed?: Documented in chart  Care impacted by chronic illness:Documented in Chart and Other: N/A  Did you consider but not order tests?: Work up considered but not performed and documented in chart, if applicable  Did you interpret images independently?: Independent interpretation of ECG and images noted in documentation, when applicable.  Consultation discussion with other provider:Did you involve another provider (consultant, , pharmacy, etc.)?: I discussed the care with another health care provider, see documentation for details.  Admit.    MIPS (CTPE, Dental pain, Jolly, Sinusitis, Asthma/COPD, Head Trauma): Not Applicable      MEDICATIONS GIVEN IN THE EMERGENCY:  Medications    sodium chloride 0.9% BOLUS 1,000 mL (0 mLs Intravenous Stopped 2/25/25 1702)   ondansetron (ZOFRAN) injection 4 mg (4 mg Intravenous $Given 2/25/25 1525)   iopamidol (ISOVUE-370) solution 90 mL (90 mLs Intravenous $Given 2/25/25 1626)   prochlorperazine (COMPAZINE) injection 5 mg (5 mg Intravenous $Given 2/25/25 1716)   sodium chloride 0.9% BOLUS 1,000 mL (1,000 mLs Intravenous $New Bag 2/25/25 1727)   cefTRIAXone (ROCEPHIN) 1 g vial to attach to  mL bag for ADULTS or NS 50 mL bag for PEDS (1 g Intravenous $New Bag 2/25/25 1801)   metoprolol (LOPRESSOR) injection 5 mg (5 mg Intravenous $Given 2/25/25 1821)   acetaminophen (TYLENOL) tablet 975 mg (975 mg Oral $Given 2/25/25 1833)       NEW PRESCRIPTIONS STARTED AT TODAY'S ER VISIT  New Prescriptions    No medications on file          =================================================================    [facility]    Patient information was obtained from: patient and patient's family member    Use of : N/A      Lilian La is a 84 year old female with no pertinent history of who presents to this ED by ambulance for evaluation of nausea, vomiting, and diarrhea.    Per patient and patient's family member, since yesterday (02/24/2025) she has had nausea, vomiting, and diarrhea. She also endorses abdominal/back pain and generalized weakness. In the past she has had similar symptoms when she gets UTIs. Her son noted that his sister thinks she doesn't drink enough water. She is on prednisone at all times.     She has had no fever or cough. Recently she has had no contact with anyone sick. She has had no medication changes. She did not mention taking anything for her symptoms today (02/25/2025).      REVIEW OF SYSTEMS   Per HPI    PAST MEDICAL HISTORY:  No past medical history on file.    PAST SURGICAL HISTORY:  No past surgical history on file.        CURRENT MEDICATIONS:    abatacept (ORENCIA) 250 MG injection  acetaminophen (TYLENOL) 500 MG tablet  apixaban  "ANTICOAGULANT (ELIQUIS) 5 MG tablet  Calcium Carb-Cholecalciferol 600-20 MG-MCG TABS  ciprofloxacin (CIPRO) 500 MG tablet  cyanocobalamin (VITAMIN B-12) 500 MCG SUBL sublingual tablet  denosumab (PROLIA) 60 MG/ML SOSY injection  diclofenac (VOLTAREN) 1 % topical gel  diphenoxylate-atropine (LOMOTIL) 2.5-0.025 MG tablet  diphenoxylate-atropine (LOMOTIL) 2.5-0.025 MG tablet  glucosamine-chondroitin 500-400 MG CAPS per capsule  LANsoprazole (PREVACID) 15 MG DR capsule  lisinopril (ZESTRIL) 5 MG tablet  metoprolol succinate ER (TOPROL XL) 50 MG 24 hr tablet  predniSONE (DELTASONE) 5 MG tablet  sertraline (ZOLOFT) 50 MG tablet  traZODone (DESYREL) 50 MG tablet  vancomycin (VANCOCIN) 125 MG capsule         ALLERGIES:  Allergies   Allergen Reactions    Sulfa Antibiotics Other (See Comments)     Passed out. Occurred in childhood       FAMILY HISTORY:  No family history on file.    SOCIAL HISTORY:   Social History     Socioeconomic History    Marital status:        VITALS:  /65   Pulse 112   Temp 100.2  F (37.9  C) (Oral)   Resp (!) 39   Ht 1.702 m (5' 7\")   Wt 61.2 kg (135 lb)   SpO2 92%   BMI 21.14 kg/m      PHYSICAL EXAM    Physical Exam  Constitutional:       General: She is not in acute distress.     Appearance: She is ill-appearing.      Comments: Vomiting throughout initial exam   HENT:      Head: Normocephalic and atraumatic.      Mouth/Throat:      Pharynx: Oropharynx is clear.   Eyes:      Pupils: Pupils are equal, round, and reactive to light.   Cardiovascular:      Rate and Rhythm: Tachycardia present. Rhythm irregularly irregular.      Pulses: Normal pulses.      Heart sounds: Normal heart sounds.   Pulmonary:      Effort: Pulmonary effort is normal.      Breath sounds: Normal breath sounds.   Abdominal:      General: Abdomen is flat. Bowel sounds are normal.      Palpations: Abdomen is soft.      Tenderness: There is no abdominal tenderness.   Musculoskeletal:         General: Normal range " of motion.   Skin:     General: Skin is warm and dry.      Capillary Refill: Capillary refill takes less than 2 seconds.   Neurological:      General: No focal deficit present.      Mental Status: She is alert and oriented to person, place, and time.           LAB:  All pertinent labs reviewed and interpreted.  Labs Ordered and Resulted from Time of ED Arrival to Time of ED Departure   BASIC METABOLIC PANEL - Abnormal       Result Value    Sodium 137      Potassium 3.7      Chloride 97 (*)     Carbon Dioxide (CO2) 25      Anion Gap 15      Urea Nitrogen 19.9      Creatinine 0.84      GFR Estimate 69      Calcium 9.9      Glucose 132 (*)    HEPATIC FUNCTION PANEL - Abnormal    Protein Total 7.3      Albumin 4.1      Bilirubin Total 2.4 (*)     Alkaline Phosphatase 112      AST 26      ALT 13      Bilirubin Direct 0.73 (*)    LACTIC ACID WHOLE BLOOD WITH 1X REPEAT IN 2 HR WHEN >2 - Abnormal    Lactic Acid, Initial 2.8 (*)    ROUTINE UA WITH MICROSCOPIC REFLEX TO CULTURE - Abnormal    Color Urine Yellow      Appearance Urine Cloudy (*)     Glucose Urine Negative      Bilirubin Urine Negative      Ketones Urine 10 (*)     Specific Gravity Urine 1.015      Blood Urine 0.5 mg/dL (*)     pH Urine 5.5      Protein Albumin Urine 100 (*)     Urobilinogen Urine <2.0      Nitrite Urine Positive (*)     Leukocyte Esterase Urine 500 Emile/uL (*)     Bacteria Urine Few (*)     WBC Clumps Urine Present (*)     Mucus Urine Present (*)     RBC Urine 10 (*)     WBC Urine >182 (*)     Squamous Epithelials Urine 1     INR - Abnormal    INR 1.24 (*)    CBC WITH PLATELETS AND DIFFERENTIAL - Abnormal    WBC Count 14.3 (*)     RBC Count 3.77 (*)     Hemoglobin 11.9      Hematocrit 35.7      MCV 95      MCH 31.6      MCHC 33.3      RDW 13.6      Platelet Count 156      % Neutrophils 85      % Lymphocytes 5      % Monocytes 9      % Eosinophils 0      % Basophils 0      % Immature Granulocytes 1      NRBCs per 100 WBC 0      Absolute  Neutrophils 12.2 (*)     Absolute Lymphocytes 0.7 (*)     Absolute Monocytes 1.3      Absolute Eosinophils 0.0      Absolute Basophils 0.0      Absolute Immature Granulocytes 0.1      Absolute NRBCs 0.0     LIPASE - Normal    Lipase 14     MAGNESIUM - Normal    Magnesium 1.7     INFLUENZA A/B, RSV AND SARS-COV2 PCR - Normal    Influenza A PCR Negative      Influenza B PCR Negative      RSV PCR Negative      SARS CoV2 PCR Negative     PARTIAL THROMBOPLASTIN TIME - Normal    aPTT 38     LACTIC ACID WHOLE BLOOD - Normal    Lactic Acid 1.8     URINE CULTURE       RADIOLOGY:  Reviewed all pertinent imaging. Please see official radiology report.  CT Abdomen Pelvis w Contrast   Final Result   IMPRESSION:    1.  Probable urinary tract infection with slight thickening of the renal collecting systems bilaterally.      2.  Nothing for pyelonephritis. No abscesses.      3.  Diverticulosis but nothing for diverticulitis.          EKG:    Performed at: 02/25/2025 at 2:53 PM    Impression: Atrial flutter with variable A-V block, nonspecific ST and T wave abnormality, abnormal ECG    Rate: 128 BPM  Rhythm: Atrial flutter  Axis: N/A, 56, -12  AZ Interval: N/A  QRS Interval: 80 ms  QTc Interval: 475 ms  ST Changes: None  Comparison: When compared to ECG from 09/12/2024 at 4:56 PM atrial flutter has replaced atrial fibrilation    I have independently reviewed and interpreted the EKG(s) documented above.    I, José Antonio Gallardo, am serving as a scribe to document services personally performed by Dr. Munira Rosales based on my observation and the provider's statements to me. I, Munira Rosales, DO attest that José Antonio Gallardo is acting in a scribe capacity, has observed my performance of the services and has documented them in accordance with my direction.    Munira Rosales DO  Emergency Medicine  [facility] EMERGENCY ROOM  [address] [id number] [phone]  Dept: [phone]      Connie  Munira DE LA TORRE DO  02/25/25 1859

## 2025-02-26 ENCOUNTER — APPOINTMENT (OUTPATIENT)
Dept: PHYSICAL THERAPY | Facility: CLINIC | Age: 85
End: 2025-02-26
Attending: FAMILY MEDICINE
Payer: COMMERCIAL

## 2025-02-26 ENCOUNTER — APPOINTMENT (OUTPATIENT)
Dept: OCCUPATIONAL THERAPY | Facility: CLINIC | Age: 85
End: 2025-02-26
Attending: FAMILY MEDICINE
Payer: COMMERCIAL

## 2025-02-26 LAB
MAGNESIUM SERPL-MCNC: 1.8 MG/DL (ref 1.7–2.3)
POTASSIUM SERPL-SCNC: 3.8 MMOL/L (ref 3.4–5.3)

## 2025-02-26 PROCEDURE — 83735 ASSAY OF MAGNESIUM: CPT | Performed by: STUDENT IN AN ORGANIZED HEALTH CARE EDUCATION/TRAINING PROGRAM

## 2025-02-26 PROCEDURE — 96376 TX/PRO/DX INJ SAME DRUG ADON: CPT

## 2025-02-26 PROCEDURE — 36415 COLL VENOUS BLD VENIPUNCTURE: CPT | Performed by: STUDENT IN AN ORGANIZED HEALTH CARE EDUCATION/TRAINING PROGRAM

## 2025-02-26 PROCEDURE — G0378 HOSPITAL OBSERVATION PER HR: HCPCS

## 2025-02-26 PROCEDURE — 250N000013 HC RX MED GY IP 250 OP 250 PS 637: Performed by: STUDENT IN AN ORGANIZED HEALTH CARE EDUCATION/TRAINING PROGRAM

## 2025-02-26 PROCEDURE — 250N000012 HC RX MED GY IP 250 OP 636 PS 637: Performed by: FAMILY MEDICINE

## 2025-02-26 PROCEDURE — 97161 PT EVAL LOW COMPLEX 20 MIN: CPT | Mod: GP

## 2025-02-26 PROCEDURE — 99232 SBSQ HOSP IP/OBS MODERATE 35: CPT | Performed by: STUDENT IN AN ORGANIZED HEALTH CARE EDUCATION/TRAINING PROGRAM

## 2025-02-26 PROCEDURE — 250N000013 HC RX MED GY IP 250 OP 250 PS 637: Performed by: FAMILY MEDICINE

## 2025-02-26 PROCEDURE — 250N000013 HC RX MED GY IP 250 OP 250 PS 637

## 2025-02-26 PROCEDURE — 97165 OT EVAL LOW COMPLEX 30 MIN: CPT | Mod: GO | Performed by: OCCUPATIONAL THERAPIST

## 2025-02-26 PROCEDURE — 97530 THERAPEUTIC ACTIVITIES: CPT | Mod: GP

## 2025-02-26 PROCEDURE — 97535 SELF CARE MNGMENT TRAINING: CPT | Mod: GO | Performed by: OCCUPATIONAL THERAPIST

## 2025-02-26 PROCEDURE — 84132 ASSAY OF SERUM POTASSIUM: CPT | Performed by: STUDENT IN AN ORGANIZED HEALTH CARE EDUCATION/TRAINING PROGRAM

## 2025-02-26 PROCEDURE — 97116 GAIT TRAINING THERAPY: CPT | Mod: GP

## 2025-02-26 PROCEDURE — 250N000011 HC RX IP 250 OP 636: Performed by: FAMILY MEDICINE

## 2025-02-26 RX ORDER — METOPROLOL TARTRATE 25 MG/1
25 TABLET, FILM COATED ORAL ONCE
Status: COMPLETED | OUTPATIENT
Start: 2025-02-26 | End: 2025-02-26

## 2025-02-26 RX ORDER — ACETAMINOPHEN 325 MG/1
650 TABLET ORAL EVERY 4 HOURS PRN
Status: DISPENSED | OUTPATIENT
Start: 2025-02-26

## 2025-02-26 RX ORDER — ACETAMINOPHEN 650 MG/1
650 SUPPOSITORY RECTAL EVERY 4 HOURS PRN
Status: ACTIVE | OUTPATIENT
Start: 2025-02-26

## 2025-02-26 RX ADMIN — METOPROLOL SUCCINATE 50 MG: 25 TABLET, EXTENDED RELEASE ORAL at 22:01

## 2025-02-26 RX ADMIN — Medication 1 TABLET: at 08:14

## 2025-02-26 RX ADMIN — CEFTRIAXONE 1 G: 1 INJECTION, POWDER, FOR SOLUTION INTRAMUSCULAR; INTRAVENOUS at 17:26

## 2025-02-26 RX ADMIN — APIXABAN 5 MG: 5 TABLET, FILM COATED ORAL at 08:17

## 2025-02-26 RX ADMIN — APIXABAN 5 MG: 5 TABLET, FILM COATED ORAL at 19:56

## 2025-02-26 RX ADMIN — METOPROLOL TARTRATE 25 MG: 25 TABLET, FILM COATED ORAL at 09:55

## 2025-02-26 RX ADMIN — SERTRALINE HYDROCHLORIDE 50 MG: 25 TABLET ORAL at 08:17

## 2025-02-26 RX ADMIN — ACETAMINOPHEN 650 MG: 325 TABLET ORAL at 17:27

## 2025-02-26 RX ADMIN — TRAZODONE HYDROCHLORIDE 50 MG: 50 TABLET ORAL at 22:02

## 2025-02-26 RX ADMIN — CYANOCOBALAMIN TAB 500 MCG 500 MCG: 500 TAB at 08:14

## 2025-02-26 RX ADMIN — PREDNISONE 6 MG: 1 TABLET ORAL at 08:15

## 2025-02-26 ASSESSMENT — ACTIVITIES OF DAILY LIVING (ADL)
ADLS_ACUITY_SCORE: 71
ADLS_ACUITY_SCORE: 71
PREVIOUS_RESPONSIBILITIES: MEAL PREP;HOUSEKEEPING;LAUNDRY;SHOPPING;MEDICATION MANAGEMENT;FINANCES;DRIVING
ADLS_ACUITY_SCORE: 71
ADLS_ACUITY_SCORE: 69
ADLS_ACUITY_SCORE: 71

## 2025-02-26 NOTE — MEDICATION SCRIBE - ADMISSION MEDICATION HISTORY
"Medication Scribe Admission Medication History    Admission medication history is complete. The information provided in this note is only as accurate as the sources available at the time of the update.    Information Source(s): Patient, Patient's pharmacy, Clinic records, and CareEverywhere/SureScripts via in-person    Pertinent Information: Patient reported she has taken one Lomotil yesterday morning, otherwise she has not taken any medication since 2/23/2025 HS due to current N/V/D. She is currently on apixaban 5 mg BID.     Abatacept appears to have been discontinued on 1/29/2025 due to \"patient decision or formulary issue\". She has not had this medication in a few months.     Lisinopril does not appear to have been specifically discontinued within her chart; however, on calling her pharmacy, they reported it had last been filled 7/2023. Removed as it has not been taken in over a year.     Changes made to PTA medication list:  Added:   Hydroxyzine 10 mg   Deleted:   Abatacept 250 mg injection - discontinued 1/29/2025  Ciprofloxacin 500 mg - not taking, not filled within the last year  Diphenosylate-atropine 2.5-0.025 mg (duplicate)  Lisinopril 5 mg - not filled since 7/2023  Vancomycin 125 mg - not taking, not filled within the last year  Changed: None    Allergies reviewed with patient and updates made in EHR: yes    Medication History Completed By: Helder Quiroz 2/25/2025 6:59 PM    PTA Med List   Medication Sig Note Last Dose/Taking    acetaminophen (TYLENOL) 500 MG tablet Take 1,000 mg by mouth At Bedtime  Past Week Bedtime    apixaban ANTICOAGULANT (ELIQUIS) 5 MG tablet Take 5 mg by mouth 2 times daily  Past Week Bedtime    Calcium Carb-Cholecalciferol 600-20 MG-MCG TABS Take 1 tablet by mouth daily 2/25/2025: afternoon 2/24/2025 Noon    cyanocobalamin (VITAMIN B-12) 500 MCG SUBL sublingual tablet Place 500 mcg under the tongue every morning  Past Week Morning    denosumab (PROLIA) 60 MG/ML SOSY " injection Inject 60 mg Subcutaneous every 6 months 2/25/2025: Due in 3/2025 (last dose 9/2024) More than a month    diclofenac (VOLTAREN) 1 % topical gel Apply 2 g topically 4 times daily as needed  Unknown    diphenoxylate-atropine (LOMOTIL) 2.5-0.025 MG tablet Take 1 tablet by mouth 4 times daily as needed for diarrhea.  2/24/2025 Morning    glucosamine-chondroitin 500-400 MG CAPS per capsule Take 2 capsules by mouth every morning  Past Week Morning    hydrOXYzine HCl (ATARAX) 10 MG tablet Take 10 mg by mouth 3 times daily as needed for anxiety.  Taking As Needed    LANsoprazole (PREVACID) 15 MG DR capsule Take 15 mg by mouth every morning  Past Week Morning    metoprolol succinate ER (TOPROL XL) 50 MG 24 hr tablet Take 50 mg by mouth At Bedtime  Past Week Bedtime    predniSONE (DELTASONE) 1 MG tablet Take 6 mg by mouth daily. Adjust as directed  Past Week Morning    sertraline (ZOLOFT) 50 MG tablet Take 50 mg by mouth every morning  Past Week Morning    traZODone (DESYREL) 50 MG tablet Take 50 mg by mouth At Bedtime  Past Week Bedtime

## 2025-02-26 NOTE — PLAN OF CARE
Problem: Nausea and Vomiting  Goal: Nausea and Vomiting Relief  2/26/2025 0624 by Gisselle Connors RN  Outcome: Progressing  2/25/2025 2143 by Gisselle Connors RN  Outcome: Progressing  Intervention: Prevent and Manage Nausea and Vomiting  Recent Flowsheet Documentation  Taken 2/26/2025 0400 by Gisselle Connors RN  Nausea/Vomiting Interventions:   antiemetic   sips of clear liquids given   Problem: Diarrhea  Goal: Effective Diarrhea Management  2/26/2025 0624 by Gisselle Connors RN  Outcome: Progressing  2/25/2025 2143 by Gisselle Connors RN  Outcome: Progressing  Intervention: Manage Diarrhea  Recent Flowsheet Documentation  Taken 2/26/2025 0400 by Gisselle Connors RN  Medication Review/Management: medications reviewed  Taken 2/26/2025 0000 by Gisselle Connors RN  Medication Review/Management: medications reviewed  Taken 2/25/2025 2100 by Gisselle Connors RN  Medication Review/Management: medications reviewed     Problem: Dysrhythmia  Goal: Normalized Cardiac Rhythm  2/26/2025 0624 by Gisselle Connors RN  Outcome: Progressing  2/25/2025 2143 by Gisselle Connors RN  Outcome: Progressing   Goal Outcome Evaluation:         Patient A/O x4. Lets needs known. On RA. VSS. Afebrile. Denies pain. No chest pain, no dizziness, no palpitation, no SOB, no N/V. Reported generalized weakness. Bedside commode provided. Had some meals last night. Tolerating oral intakes. Voided. Passing gas but no BM. Couldn't collect stool sample to R/O Cdiff). Call light with in reach.

## 2025-02-26 NOTE — H&P
[facility] MEDICINE ADMISSION HISTORY AND PHYSICAL     Assessment & Plan       Lilian La is a 84 year old female with history of atrial fibrillation, on chronic anticoagulation treatment, temporal arteritis, GERD, anxiety, depression, lives independently, came with diffuse abdominal pain, recurrent nonbloody diarrhea, intractable nausea and vomiting for 2 days.  Her appetite is markedly down.  Found to have UTI.  Started on IV ceftriaxone.  Received 2 L fluid bolus. Patient is still symptomatic.  Admitted to the hospital for further evaluation and treatment.    UTI  History of recurrent UTI  IV ceftriaxone 1 g daily  Urine and blood culture pending  CT-Probable urinary tract infection with slight thickening of the renal collecting systems bilaterally.     Acute gastroenteritis likely viral  Sudden onset of symptoms  Intractable nausea and vomiting  Nonbloody diarrhea  Abdomen CT-unremarkable  Stool studies    GERD, resume PPI    Chronic atrial fibrillation with RVR  Tachycardia secondary to dehydration status post 2 L fluid bolus  IV metoprolol 5 mg once  P.o. metoprolol 50 mg daily  Resume chronic anticoagulation treatment with Eliquis 5 mg twice a day    History of temporal arteritis  Resume prednisone    Code DNR  DVT prophylaxis  On chronic anticoagulation treatment  Inpatient admission  Needs to stay in the hospital at least for 2 days for further evaluation and treatment    Medically Ready for Discharge: Anticipated in 2-4 Days      Clinically Significant Risk Factors Present on Admission          # Hypochloremia: Lowest Cl = 97 mmol/L in last 2 days, will monitor as appropriate       # Drug Induced Coagulation Defect: home medication list includes an anticoagulant medication                         Chief Complaint Nausea, vomiting, nonbloody diarrhea for 2 days     HISTORY     Lilian La is a 84 year old female with atrial fibrillation, on chronic anticoagulation treatment,  GERD, temporal arteritis ,anxiety, depression, lives independently, came with diffuse abdominal pain, recurrent nonbloody diarrhea, intractable nausea and vomiting for 2 days.  Her appetite is markedly down.  She lives independently.  No sick contact, recent travel, no new food or antibiotic use.  Had Found to have UTI.  Started on IV ceftriaxone.  No urinary symptoms.  She has tachycardia with heart rate 130s.  Received 2 L fluid bolus. Patient is still symptomatic.  Has chronic atrial fibrillation with RVR.  Received IV metoprolol 5 mg once.  Heart rate is in 110's now.  Denies headache, chest pain, breathing difficulty, palpitation, urinary symptoms.  Admitted to the hospital for further evaluation and treatment.  History is provided by the patient.  Son is present.  Spoke with ED physician.    Past Medical History     A-fib with RVR, GERD, depression, anxiety, osteopenia    Surgical History   Lateral upper eyelid ptosis required blepharoplasty  Family History    Reviewed, and No family history on file.      Social History        Lives independently.  Not smoking tobacco or drinking alcohol  Allergies     Allergies   Allergen Reactions    Sulfa Antibiotics Other (See Comments)     Passed out. Occurred in childhood     Prior to Admission Medications      Prior to Admission Medications   Prescriptions Last Dose Informant Patient Reported? Taking?   Calcium Carb-Cholecalciferol 600-20 MG-MCG TABS 2/24/2025 Noon  Yes Yes   Sig: Take 1 tablet by mouth daily   LANsoprazole (PREVACID) 15 MG DR capsule Past Week Morning  Yes Yes   Sig: Take 15 mg by mouth every morning   acetaminophen (TYLENOL) 500 MG tablet Past Week Bedtime  Yes Yes   Sig: Take 1,000 mg by mouth At Bedtime   apixaban ANTICOAGULANT (ELIQUIS) 5 MG tablet Past Week Bedtime  Yes Yes   Sig: Take 5 mg by mouth 2 times daily   cyanocobalamin (VITAMIN B-12) 500 MCG SUBL sublingual tablet Past Week Morning  Yes Yes   Sig: Place 500 mcg under the tongue every  morning   denosumab (PROLIA) 60 MG/ML SOSY injection More than a month  Yes Yes   Sig: Inject 60 mg Subcutaneous every 6 months   diclofenac (VOLTAREN) 1 % topical gel Unknown  Yes Yes   Sig: Apply 2 g topically 4 times daily as needed   diphenoxylate-atropine (LOMOTIL) 2.5-0.025 MG tablet 2/24/2025 Morning  Yes Yes   Sig: Take 1 tablet by mouth 4 times daily as needed for diarrhea.   glucosamine-chondroitin 500-400 MG CAPS per capsule Past Week Morning  Yes Yes   Sig: Take 2 capsules by mouth every morning   hydrOXYzine HCl (ATARAX) 10 MG tablet   Yes Yes   Sig: Take 10 mg by mouth 3 times daily as needed for anxiety.   metoprolol succinate ER (TOPROL XL) 50 MG 24 hr tablet Past Week Bedtime  Yes Yes   Sig: Take 50 mg by mouth At Bedtime   predniSONE (DELTASONE) 1 MG tablet Past Week Morning  Yes Yes   Sig: Take 6 mg by mouth daily. Adjust as directed   sertraline (ZOLOFT) 50 MG tablet Past Week Morning  Yes Yes   Sig: Take 50 mg by mouth every morning   traZODone (DESYREL) 50 MG tablet Past Week Bedtime  Yes Yes   Sig: Take 50 mg by mouth At Bedtime      Facility-Administered Medications: None      Review of Systems     A 12 point comprehensive review of systems was negative except as noted above in HPI.    PHYSICAL EXAMINATION       Vitals      Temp:  [98.7  F (37.1  C)-100.2  F (37.9  C)] 100.2  F (37.9  C)  Pulse:  [101-134] 110  Resp:  [22-63] 31  BP: (118-163)/(61-93) 119/72  SpO2:  [92 %-100 %] 98 %    Examination     GENERAL:  Alert, appears comfortable, in no acute distress, appears stated age   HEAD:  Normocephalic, without obvious abnormality, atraumatic   EYES:  PERRL, conjunctiva clear,  EOM's intact   NOSE: Nares normal,  mucosa normal, no drainage   THROAT: Lips, mucosa,   gums normal, mouth moist   NECK: Supple, symmetrical, trachea midline   BACK:   Symmetric, no curvature, ROM normal   LUNGS:   Clear to auscultation bilaterally, no rales, rhonchi, or wheezing, symmetric chest rise on inhalation,  respirations unlabored   CHEST WALL:  No tenderness or deformity   HEART:  Irregular  rhythm,tachycardia,  S1 and S2 normal, no murmur    ABDOMEN:   Soft, non-tender, bowel sounds active , no masses, no organomegaly, no rebound or guarding   EXTREMITIES: No BLE edema    SKIN: No rashes in the visualized areas   NEURO: Alert, oriented x 3,  non-focal   PSYCH: Cooperative, behavior is appropriate      Pertinent Lab   Most Recent 3 CBC's:  Recent Labs   Lab Test 02/25/25  1517 09/12/24  1624 07/09/23  1405   WBC 14.3* 6.9 9.4   HGB 11.9 12.4 10.8*   MCV 95 91 99    182 270     Most Recent 3 BMP's:  Recent Labs   Lab Test 02/25/25  1517 09/12/24  1624 07/09/23  1405    137 141   POTASSIUM 3.7 5.0 3.4*   CHLORIDE 97* 97* 99   CO2 25 24 29   BUN 19.9 12.9 16   CR 0.84 0.68 0.87   ANIONGAP 15 16* 13   YIN 9.9 8.8 9.9   * 83 146*     Most Recent 2 LFT's:  Recent Labs   Lab Test 02/25/25  1517 07/09/23  1405   AST 26 26   ALT 13 49*   ALKPHOS 112 132*   BILITOTAL 2.4* 1.5*     Most Recent Urinalysis:  Recent Labs   Lab Test 02/25/25  1611   COLOR Yellow   APPEARANCE Cloudy*   URINEGLC Negative   URINEBILI Negative   URINEKETONE 10*   SG 1.015   UBLD 0.5 mg/dL*   URINEPH 5.5   PROTEIN 100*   NITRITE Positive*   LEUKEST 500 Emile/uL*   RBCU 10*   WBCU >182*         Pertinent Radiology     Radiology Results:   Abdomen ct  1.  Probable urinary tract infection with slight thickening of the renal collecting systems bilaterally.  2.  Nothing for pyelonephritis. No abscesses.  3.  Diverticulosis but nothing for diverticulitis.    EKG Results: A-fib with RVR, heart rate 128    Total time spent 70 min  Saira Del Rio MD  Hospitalist  [facility]  [facility]   Phone: #[phone]

## 2025-02-26 NOTE — PLAN OF CARE
Patient up and ambulatory, call light merrill. Continent of b/b.   No BM this shift, still awaiting BM for stool sample to be sent.  Tolerating reg diet well, no complaints of nausea or vomiting.  Beta blocker admined as ordered for HR above 100 sustained- now currently afib rate controlled on tele.   Alert and oriented, vitally stable and on room air.   Pending PT/OT assessment due to weakness post most recent episodes of vomiting and diarrhea reported.   Chronic pain in right shoulder- MD ordered tylenol and voltaren gel- both utilized with effective results.   IVF conservatively running at 50ml/hr via left PIV.  Devi Rodas RN      Problem: Delirium  Goal: Optimal Coping  Outcome: Progressing  Goal: Improved Sleep  Outcome: Progressing   Goal Outcome Evaluation:

## 2025-02-26 NOTE — PROGRESS NOTES
Occupational Therapy Discharge Summary    Reason for therapy discharge:    All goals and outcomes met, no further needs identified.    Progress towards therapy goal(s). See goals on Care Plan in [facility] electronic health record for goal details.  Goals met    Therapy recommendation(s):    No further therapy is recommended.

## 2025-02-26 NOTE — PROGRESS NOTES
"   02/26/25 8910   Appointment Info   Signing Clinician's Name / Credentials (PT) Casi Butler, PT, DPT   Quick Adds   Quick Adds Certification   Living Environment   People in Home alone  (son lives 6 min, family/grandchildren could stay and assist if needed)   Current Living Arrangements condominium  (pt reported senior living - independent)   Home Accessibility no concerns  (no stairs)   Self-Care   Equipment Currently Used at Home none  (has a FWW but does not typically use. Has been using last few days d/t weakness)   Fall history within last six months no   Activity/Exercise/Self-Care Comment pt reports being independent with functional mobility at baseline   General Information   Onset of Illness/Injury or Date of Surgery 02/25/25   Referring Physician Saira Del Rio MD   Patient/Family Therapy Goals Statement (PT) Return to Home   Pertinent History of Current Problem (include personal factors and/or comorbidities that impact the POC) Per Chart Review -\"84 year old female with history of atrial fibrillation, on chronic anticoagulation treatment, temporal arteritis, GERD, anxiety, depression, lives independently, came with diffuse abdominal pain, recurrent nonbloody diarrhea, intractable nausea and vomiting for 2 days.  Her appetite is markedly down.  Found to have UTI.  Started on IV ceftriaxone.  Received 2 L fluid bolus. Patient is still symptomatic.  Admitted to the hospital for further evaluation and treatment.\"   Existing Precautions/Restrictions fall   Range of Motion (ROM)   Range of Motion ROM is WFL   Strength (Manual Muscle Testing)   Strength (Manual Muscle Testing) Deficits observed during functional mobility   Bed Mobility   Bed Mobility supine-sit   Supine-Sit Saline (Bed Mobility) minimum assist (75% patient effort)   Comment, (Bed Mobility) Increased assist from [facility] bed   Transfers   Transfers sit-stand transfer   Maintains Weight-bearing Status (Transfers) able to maintain " "  Sit-Stand Transfer   Sit-Stand [place] (Transfers) contact guard   Assistive Device (Sit-Stand Transfers) walker, front-wheeled   Gait/Stairs (Locomotion)   [place] Level (Gait) contact guard   Assistive Device (Gait) walker, front-wheeled   Distance in Feet (Gait) 5   Pattern (Gait) swing-through;step-through   Deviations/Abnormal Patterns (Gait) brigette decreased;gait speed decreased   Maintains Weight-bearing Status (Gait) able to maintain   Clinical Impression   Criteria for Skilled Therapeutic Intervention Yes, treatment indicated   PT Diagnosis (PT) Impaired Functional Mobility   Influenced by the following impairments weakness, impaired balance   Functional limitations due to impairments gait, transfers   Clinical Presentation (PT Evaluation Complexity) stable   Clinical Presentation Rationale pt presents as medically diagnosed   Clinical Decision Making (Complexity) low complexity   Planned Therapy Interventions (PT) balance training;bed mobility training;gait training;home exercise program;neuromuscular re-education;strengthening;ROM (range of motion);transfer training;home program guidelines   Risk & Benefits of therapy have been explained evaluation/treatment results reviewed;patient   PT Total Evaluation Time   PT Eval, Low Complexity Minutes (04498) 8   Therapy Certification   Start of care date 02/26/25   Certification date from 02/26/25   Certification date to 03/05/25   Medical Diagnosis Per Chart Review -\"84 year old female with history of atrial fibrillation, on chronic anticoagulation treatment, temporal arteritis, GERD, anxiety, depression, lives independently, came with diffuse abdominal pain, recurrent nonbloody diarrhea, intractable nausea and vomiting for 2 days. Her appetite is markedly down. Found to have UTI. Started on IV ceftriaxone. Received 2 L fluid bolus. Patient is still symptomatic. Admitted to the hospital for further evaluation and treatment.\"   Physical Therapy Goals "   PT Frequency Daily   PT Predicted Duration/Target Date for Goal Attainment 03/05/25   PT Goals Transfers;Gait;PT Goal 1   PT: Transfers Modified independent;Sit to/from stand;Assistive device;Within precautions   PT: Gait Modified independent;Rolling walker;Within precautions;100 feet   PT: Goal 1 pt will be mod I with HEP for progressing strength/balance   Interventions   Interventions Quick Adds Gait Training;Therapeutic Activity   Therapeutic Activity   Therapeutic Activities: dynamic activities to improve functional performance Minutes (75938) 11   Symptoms Noted During/After Treatment Fatigue   Treatment Detail/Skilled Intervention Sit to/from stand: cueing for safety/technique/hand placement, SBA from higher surface (gurney), Min A from lower surface; standing balance: cueing for safety/posture, SBA with B UE support via FWW. pt personal FWW in room - noted increased height of FWW, reviewed recommendation for sizing to avoid over use of UT/shoulder with tall FWW. supine to/from sit: cueing for safety, Min A; sitting balance EOB: cueing for safety, SBA; lateral stepping: cueing for safety/FWW management, CGA; call light within reach at end of session, no further PT concerns reported.   Gait Training   Gait Training Minutes (59535) 12   Symptoms Noted During/After Treatment (Gait Training) fatigue   Treatment Detail/Skilled Intervention pt ambulated in hallway with & without a device. seated rest break d/t fatigue. cueing for safety/technique/posture/keeping FWW close. Instability noted with out FWW - reviewed recommendation for use of FWW, pt agreeable   Distance in Feet 60x2   [place] Level (Gait Training) contact guard  (Close SBA with FWW, CGA/Min A with out a device)   Physical Assistance Level (Gait Training) supervision;verbal cues   Weight Bearing (Gait Training) weight-bearing as tolerated   Assistive Device (Gait Training) rolling walker  (FWW)   Pattern Analysis (Gait Training) swing-through  "gait   Gait Analysis Deviations decreased brigette;decreased step length   Impairments (Gait Analysis/Training) balance impaired;strength decreased   PT Discharge Planning   PT Plan gait as concepcion with/without FWW/SPC, strengthening, transfers from low surfaces   PT Discharge Recommendation (DC Rec) home with assist;home with home care physical therapy   PT Rationale for DC Rec Anticipate pt will be able to complete required functional mobility at home with assist from family d/t weakness. pt reported son able to assist at home. Recommend use of FWW & home PT for progressing strength/balance   PT Brief overview of current status CGA- close SBA   PT Total Distance Amb During Session (feet) 125   PT Equipment Needed at Discharge walker, rolling   Physical Therapy Time and Intention   Timed Code Treatment Minutes 23   Total Session Time (sum of timed and untimed services) 31     [facility]                                                                                   OUTPATIENT PHYSICAL THERAPY    PLAN OF TREATMENT FOR OUTPATIENT REHABILITATION   Patient's Last Name, First Name, M.IAntoine  Lilian La YOB: 1940   Provider's Name   [facility]   Medical Record No.  [id number]     Onset Date: 02/25/25 Start of Care Date: 02/26/25     Medical Diagnosis:  Per Chart Review -\"84 year old female with history of atrial fibrillation, on chronic anticoagulation treatment, temporal arteritis, GERD, anxiety, depression, lives independently, came with diffuse abdominal pain, recurrent nonbloody diarrhea, intractable nausea and vomiting for 2 days. Her appetite is markedly down. Found to have UTI. Started on IV ceftriaxone. Received 2 L fluid bolus. Patient is still symptomatic. Admitted to the hospital for further evaluation and treatment.\"               PT Diagnosis:  Impaired Functional Mobility Certification Dates:  From: 02/26/25  To: 03/05/25       See " note for plan of treatment, functional goals, and certification details.    I CERTIFY THE NEED FOR THESE SERVICES FURNISHED UNDER        THIS PLAN OF TREATMENT AND WHILE UNDER MY CARE (Physician co-signature of this document indicates review and certification of the therapy plan).

## 2025-02-26 NOTE — PROGRESS NOTES
[facility] MEDICINE  PROGRESS NOTE       Securely message me with Rodriguez (more info)    Code Status: No CPR- Do NOT Intubate       Identification/Summary:   Lilian La is a 84 year old female with history of atrial fibrillation, on chronic anticoagulation treatment, temporal arteritis, GERD, anxiety, depression, lives independently, came with diffuse abdominal pain, recurrent nonbloody diarrhea, intractable nausea and vomiting for 2 days.  Her appetite is markedly down.  Found to have UTI.  Started on IV ceftriaxone.  Received 2 L fluid bolus. Patient is still symptomatic.  Admitted to the hospital for further evaluation and treatment.     Barriers to Discharge: empiric IV abx    Disposition: observation    Assessment and Plan:    UTI  History of recurrent UTI  IV ceftriaxone 1 g daily  Urine and blood culture pending  CT-Probable urinary tract infection with slight thickening of the renal collecting systems bilaterally.   Called microbiology on 2/26 to make sure susceptibility is running  No more than 3 UTIs in the past year or 2 UTIs in the past 6 months. No indication for prophylactic abx.     Acute gastroenteritis likely viral  Sudden onset of symptoms  No stool since admission    Intractable nausea and vomiting  Nonbloody diarrhea  Abdomen CT-unremarkable  Stool studies     GERD, resume PPI     Chronic atrial fibrillation with RVR  Tachycardia secondary to dehydration status post 2 L fluid bolus  IV metoprolol 5 mg once  P.o. metoprolol 50 mg daily  Resume chronic anticoagulation treatment with Eliquis 5 mg twice a day     History of temporal arteritis  Resume prednisone         Anticoagulation   Orders (Includes Only Anticoagulants)  apixaban ANTICOAGULANT, 5 mg, Oral, BID      Therapy: PT, OT  Jolly:Not present  Lines: None       Current Diet  Orders Placed This Encounter      Regular Diet Adult        Clinically Significant Risk Factors Present on Admission          #  Hypochloremia: Lowest Cl = 97 mmol/L in last 2 days, will monitor as appropriate       # Drug Induced Coagulation Defect: home medication list includes an anticoagulant medication        # Acute Hypoxic Respiratory Failure: Documented O2 saturation < 90%. Continue supplemental oxygen as needed                   Interval History/Subjective:  Patient feels better. No nausea, abd pain or diarrhea. Denies dysuria. Kendall having frequent UTIs.        Physical Exam/Objective:  Temp:  [98.2  F (36.8  C)-100.2  F (37.9  C)] 98.2  F (36.8  C)  Pulse:  [] 105  Resp:  [16-64] 28  BP: ()/(50-93) 102/55  SpO2:  [76 %-100 %] 80 %  Wt Readings from Last 4 Encounters:   02/25/25 61.2 kg (135 lb)   09/12/24 60.3 kg (133 lb)   07/09/23 65.8 kg (145 lb)   07/06/23 72.6 kg (160 lb 1.6 oz)     Body mass index is 21.14 kg/m .    General Appearance: Alert and wake, not in distress  Respiratory: clear lungs, no crackles or wheezing  Cardiovascular: rhythmic, normal S1 and S2, no murmur  GI: soft, non-tender, normal bowel sound  Neurology: oriented x 3  Psych: cooperative and calm, normal affect    Medications:   Personally Reviewed.  Medications   Current Facility-Administered Medications   Medication Dose Route Frequency Provider Last Rate Last Admin    Patient is already receiving anticoagulation with heparin, enoxaparin (LOVENOX), warfarin (COUMADIN)  or other anticoagulant medication   Does not apply Continuous PRN Saira Del Rio MD        sodium chloride 0.9 % infusion   Intravenous Continuous Saira Del Rio MD 50 mL/hr at 02/26/25 1118 Rate Verify at 02/26/25 1118     Current Facility-Administered Medications   Medication Dose Route Frequency Provider Last Rate Last Admin    apixaban ANTICOAGULANT (ELIQUIS) tablet 5 mg  5 mg Oral BID Saira Del Rio MD   5 mg at 02/26/25 0817    calcium carbonate-vitamin D (OSCAL) 500-5 MG-MCG per tablet 1 tablet  1 tablet Oral Daily Saira Del Rio MD   1 tablet at 02/26/25 0814     cefTRIAXone (ROCEPHIN) 1 g vial to attach to  mL bag for ADULTS or NS 50 mL bag for PEDS  1 g Intravenous Q24H Saira Del Rio MD        cyanocobalamin (VITAMIN B-12) tablet 500 mcg  500 mcg Oral ARCHIEM Saira Del Rio MD   500 mcg at 02/26/25 0814    metoprolol succinate ER (TOPROL XL) 24 hr tablet 50 mg  50 mg Oral At Bedtime Eladio Strauss MD   50 mg at 02/25/25 2205    predniSONE (DELTASONE) tablet 6 mg  6 mg Oral Daily Saira Del Rio MD   6 mg at 02/26/25 0815    sertraline (ZOLOFT) tablet 50 mg  50 mg Oral Saira Flores MD   50 mg at 02/26/25 0817    sodium chloride (PF) 0.9% PF flush 3 mL  3 mL Intracatheter Q8H Saira Del Rio MD        traZODone (DESYREL) tablet 50 mg  50 mg Oral At Bedtime Saira Del Rio MD   50 mg at 02/25/25 2205       Data reviewed today: I personally reviewed all new medications, labs, imaging/diagnostics reports over the past 24 hours. Pertinent findings include:    Imaging:   Recent Results (from the past 24 hours)   CT Abdomen Pelvis w Contrast    Narrative    EXAM: CT ABDOMEN PELVIS W CONTRAST  LOCATION: [facility]  DATE: 2/25/2025    INDICATION: Abdominal pain, vomiting  COMPARISON: CT 7/2/2023  TECHNIQUE: CT scan of the abdomen and pelvis was performed following injection of IV contrast. Multiplanar reformats were obtained. Dose reduction techniques were used.  CONTRAST: ISOVUE 370 90mL    FINDINGS:   LOWER CHEST: Normal.    HEPATOBILIARY: Gallbladder removed. Mildly prominent extrahepatic bile ducts likely reservoir effect similar to previous.    PANCREAS: Normal.    SPLEEN: Normal.    ADRENAL GLANDS: Normal.    KIDNEYS/BLADDER: Mild thickening of the renal pelvis bilaterally greater on the right side suggestive of urinary tract infection, nothing concerning for pyelonephritis.    BOWEL: Moderate diver sequela without diverticulitis.    LYMPH NODES: Normal.    VASCULATURE: Severe calcified plaque. No aneurysms.    PELVIC ORGANS:  Normal.    MUSCULOSKELETAL: Normal.      Impression    IMPRESSION:   1.  Probable urinary tract infection with slight thickening of the renal collecting systems bilaterally.    2.  Nothing for pyelonephritis. No abscesses.    3.  Diverticulosis but nothing for diverticulitis.       Labs:  CT Abdomen Pelvis w Contrast   Final Result   IMPRESSION:    1.  Probable urinary tract infection with slight thickening of the renal collecting systems bilaterally.      2.  Nothing for pyelonephritis. No abscesses.      3.  Diverticulosis but nothing for diverticulitis.        Recent Results (from the past 24 hours)   ECG 12-LEAD WITH MUSE (LHE)    Collection Time: 02/25/25  2:53 PM   Result Value Ref Range    Systolic Blood Pressure  mmHg    Diastolic Blood Pressure  mmHg    Ventricular Rate 128 BPM    Atrial Rate 374 BPM    ID Interval  ms    QRS Duration 80 ms     ms    QTc 475 ms    P Axis  degrees    R AXIS 56 degrees    T Axis -12 degrees    Interpretation ECG       Atrial flutter with variable A-V block  Nonspecific ST and T wave abnormality  Abnormal ECG  When compared with ECG of 12-Sep-2024 16:56,  Atrial flutter has replaced Atrial fibrillation  Confirmed by SEE ED PROVIDER NOTE FOR, ECG INTERPRETATION ([id number]),  Guillermina Brice ([id number]) on 2/25/2025 5:56:56 PM     Basic metabolic panel    Collection Time: 02/25/25  3:17 PM   Result Value Ref Range    Sodium 137 135 - 145 mmol/L    Potassium 3.7 3.4 - 5.3 mmol/L    Chloride 97 (L) 98 - 107 mmol/L    Carbon Dioxide (CO2) 25 22 - 29 mmol/L    Anion Gap 15 7 - 15 mmol/L    Urea Nitrogen 19.9 8.0 - 23.0 mg/dL    Creatinine 0.84 0.51 - 0.95 mg/dL    GFR Estimate 69 >60 mL/min/1.73m2    Calcium 9.9 8.8 - 10.4 mg/dL    Glucose 132 (H) 70 - 99 mg/dL   Hepatic function panel    Collection Time: 02/25/25  3:17 PM   Result Value Ref Range    Protein Total 7.3 6.4 - 8.3 g/dL    Albumin 4.1 3.5 - 5.2 g/dL    Bilirubin Total 2.4 (H) <=1.2 mg/dL    Alkaline Phosphatase 112 40 -  150 U/L    AST 26 0 - 45 U/L    ALT 13 0 - 50 U/L    Bilirubin Direct 0.73 (H) 0.00 - 0.30 mg/dL   Lipase    Collection Time: 02/25/25  3:17 PM   Result Value Ref Range    Lipase 14 13 - 60 U/L   Lactic acid whole blood with 1x repeat in 2 hr when >2    Collection Time: 02/25/25  3:17 PM   Result Value Ref Range    Lactic Acid, Initial 2.8 (H) 0.7 - 2.0 mmol/L   Magnesium    Collection Time: 02/25/25  3:17 PM   Result Value Ref Range    Magnesium 1.7 1.7 - 2.3 mg/dL   INR    Collection Time: 02/25/25  3:17 PM   Result Value Ref Range    INR 1.24 (H) 0.85 - 1.15   PTT    Collection Time: 02/25/25  3:17 PM   Result Value Ref Range    aPTT 38 22 - 38 Seconds   CBC with platelets and differential    Collection Time: 02/25/25  3:17 PM   Result Value Ref Range    WBC Count 14.3 (H) 4.0 - 11.0 10e3/uL    RBC Count 3.77 (L) 3.80 - 5.20 10e6/uL    Hemoglobin 11.9 11.7 - 15.7 g/dL    Hematocrit 35.7 35.0 - 47.0 %    MCV 95 78 - 100 fL    MCH 31.6 26.5 - 33.0 pg    MCHC 33.3 31.5 - 36.5 g/dL    RDW 13.6 10.0 - 15.0 %    Platelet Count 156 150 - 450 10e3/uL    % Neutrophils 85 %    % Lymphocytes 5 %    % Monocytes 9 %    % Eosinophils 0 %    % Basophils 0 %    % Immature Granulocytes 1 %    NRBCs per 100 WBC 0 <1 /100    Absolute Neutrophils 12.2 (H) 1.6 - 8.3 10e3/uL    Absolute Lymphocytes 0.7 (L) 0.8 - 5.3 10e3/uL    Absolute Monocytes 1.3 0.0 - 1.3 10e3/uL    Absolute Eosinophils 0.0 0.0 - 0.7 10e3/uL    Absolute Basophils 0.0 0.0 - 0.2 10e3/uL    Absolute Immature Granulocytes 0.1 <=0.4 10e3/uL    Absolute NRBCs 0.0 10e3/uL   Influenza A/B, RSV and SARS-CoV2 PCR (COVID-19) Nasopharyngeal    Collection Time: 02/25/25  3:45 PM    Specimen: Nasopharyngeal; Swab   Result Value Ref Range    Influenza A PCR Negative Negative    Influenza B PCR Negative Negative    RSV PCR Negative Negative    SARS CoV2 PCR Negative Negative   UA with Microscopic reflex to Culture    Collection Time: 02/25/25  4:11 PM    Specimen: Urine, NOS    Result Value Ref Range    Color Urine Yellow Colorless, Straw, Light Yellow, Yellow    Appearance Urine Cloudy (A) Clear    Glucose Urine Negative Negative mg/dL    Bilirubin Urine Negative Negative    Ketones Urine 10 (A) Negative mg/dL    Specific Gravity Urine 1.015 1.001 - 1.030    Blood Urine 0.5 mg/dL (A) Negative    pH Urine 5.5 5.0 - 7.0    Protein Albumin Urine 100 (A) Negative mg/dL    Urobilinogen Urine <2.0 <2.0 mg/dL    Nitrite Urine Positive (A) Negative    Leukocyte Esterase Urine 500 Emile/uL (A) Negative    Bacteria Urine Few (A) None Seen /HPF    WBC Clumps Urine Present (A) None Seen /HPF    Mucus Urine Present (A) None Seen /LPF    RBC Urine 10 (H) <=2 /HPF    WBC Urine >182 (H) <=5 /HPF    Squamous Epithelials Urine 1 <=1 /HPF   Urine Culture    Collection Time: 02/25/25  4:11 PM    Specimen: Urine, NOS   Result Value Ref Range    Culture 50,000-100,000 CFU/mL Escherichia coli (A)    Lactic acid whole blood    Collection Time: 02/25/25  5:07 PM   Result Value Ref Range    Lactic Acid 1.8 0.7 - 2.0 mmol/L       Pending Labs:  Unresulted Labs Ordered in the Past 30 Days of this Admission       Date and Time Order Name Status Description    2/25/2025  5:17 PM Urine Culture Preliminary               PEREZ FLORES MD  [facility] Medicine  [facility]  Phone: #[phone]    Securely message me with Rodriguez (more info)

## 2025-02-27 ENCOUNTER — APPOINTMENT (OUTPATIENT)
Dept: CARDIOLOGY | Facility: CLINIC | Age: 85
End: 2025-02-27
Attending: FAMILY MEDICINE
Payer: COMMERCIAL

## 2025-02-27 LAB
ADV 40+41 DNA STL QL NAA+NON-PROBE: NEGATIVE
ASTRO TYP 1-8 RNA STL QL NAA+NON-PROBE: NEGATIVE
BACTERIA UR CULT: ABNORMAL
C CAYETANENSIS DNA STL QL NAA+NON-PROBE: NEGATIVE
CAMPYLOBACTER DNA SPEC NAA+PROBE: NEGATIVE
CRYPTOSP DNA STL QL NAA+NON-PROBE: NEGATIVE
E COLI O157 DNA STL QL NAA+NON-PROBE: NORMAL
E HISTOLYT DNA STL QL NAA+NON-PROBE: NEGATIVE
EAEC ASTA GENE ISLT QL NAA+PROBE: NEGATIVE
EC STX1+STX2 GENES STL QL NAA+NON-PROBE: NEGATIVE
EPEC EAE GENE STL QL NAA+NON-PROBE: NEGATIVE
ETEC LTA+ST1A+ST1B TOX ST NAA+NON-PROBE: NEGATIVE
G LAMBLIA DNA STL QL NAA+NON-PROBE: NEGATIVE
LVEF ECHO: NORMAL
NOROVIRUS GI+II RNA STL QL NAA+NON-PROBE: NEGATIVE
P SHIGELLOIDES DNA STL QL NAA+NON-PROBE: NEGATIVE
RVA RNA STL QL NAA+NON-PROBE: NEGATIVE
SALMONELLA SP RPOD STL QL NAA+PROBE: NEGATIVE
SAPO I+II+IV+V RNA STL QL NAA+NON-PROBE: NEGATIVE
SHIGELLA SP+EIEC IPAH ST NAA+NON-PROBE: NEGATIVE
V CHOLERAE DNA SPEC QL NAA+PROBE: NEGATIVE
VIBRIO DNA SPEC NAA+PROBE: NEGATIVE
Y ENTEROCOL DNA STL QL NAA+PROBE: NEGATIVE

## 2025-02-27 PROCEDURE — 87507 IADNA-DNA/RNA PROBE TQ 12-25: CPT | Performed by: FAMILY MEDICINE

## 2025-02-27 PROCEDURE — 96376 TX/PRO/DX INJ SAME DRUG ADON: CPT | Mod: 59

## 2025-02-27 PROCEDURE — 99233 SBSQ HOSP IP/OBS HIGH 50: CPT | Performed by: FAMILY MEDICINE

## 2025-02-27 PROCEDURE — G0378 HOSPITAL OBSERVATION PER HR: HCPCS

## 2025-02-27 PROCEDURE — 250N000011 HC RX IP 250 OP 636: Performed by: FAMILY MEDICINE

## 2025-02-27 PROCEDURE — 255N000002 HC RX 255 OP 636: Performed by: FAMILY MEDICINE

## 2025-02-27 PROCEDURE — 250N000012 HC RX MED GY IP 250 OP 636 PS 637: Performed by: FAMILY MEDICINE

## 2025-02-27 PROCEDURE — 93306 TTE W/DOPPLER COMPLETE: CPT | Mod: 26 | Performed by: GENERAL ACUTE CARE HOSPITAL

## 2025-02-27 PROCEDURE — 250N000013 HC RX MED GY IP 250 OP 250 PS 637: Performed by: FAMILY MEDICINE

## 2025-02-27 RX ORDER — METOPROLOL TARTRATE 1 MG/ML
5 INJECTION, SOLUTION INTRAVENOUS ONCE
Status: DISCONTINUED | OUTPATIENT
Start: 2025-02-27 | End: 2025-02-27

## 2025-02-27 RX ORDER — METOPROLOL TARTRATE 1 MG/ML
2.5 INJECTION, SOLUTION INTRAVENOUS EVERY 6 HOURS PRN
Status: ACTIVE | OUTPATIENT
Start: 2025-02-27

## 2025-02-27 RX ADMIN — APIXABAN 5 MG: 5 TABLET, FILM COATED ORAL at 21:10

## 2025-02-27 RX ADMIN — PERFLUTREN 3 ML: 6.52 INJECTION, SUSPENSION INTRAVENOUS at 14:46

## 2025-02-27 RX ADMIN — CYANOCOBALAMIN TAB 500 MCG 500 MCG: 500 TAB at 09:16

## 2025-02-27 RX ADMIN — METOPROLOL TARTRATE 12.5 MG: 25 TABLET, FILM COATED ORAL at 11:43

## 2025-02-27 RX ADMIN — TRAZODONE HYDROCHLORIDE 50 MG: 50 TABLET ORAL at 21:10

## 2025-02-27 RX ADMIN — Medication 1 TABLET: at 08:55

## 2025-02-27 RX ADMIN — ONDANSETRON 4 MG: 4 TABLET, ORALLY DISINTEGRATING ORAL at 11:46

## 2025-02-27 RX ADMIN — APIXABAN 5 MG: 5 TABLET, FILM COATED ORAL at 08:55

## 2025-02-27 RX ADMIN — SERTRALINE HYDROCHLORIDE 50 MG: 25 TABLET ORAL at 09:16

## 2025-02-27 RX ADMIN — PREDNISONE 6 MG: 1 TABLET ORAL at 09:16

## 2025-02-27 RX ADMIN — ONDANSETRON 4 MG: 4 TABLET, ORALLY DISINTEGRATING ORAL at 05:51

## 2025-02-27 RX ADMIN — CEFTRIAXONE 1 G: 1 INJECTION, POWDER, FOR SOLUTION INTRAMUSCULAR; INTRAVENOUS at 18:06

## 2025-02-27 ASSESSMENT — ACTIVITIES OF DAILY LIVING (ADL)
ADLS_ACUITY_SCORE: 41
ADLS_ACUITY_SCORE: 42
ADLS_ACUITY_SCORE: 71
ADLS_ACUITY_SCORE: 41
ADLS_ACUITY_SCORE: 71
ADLS_ACUITY_SCORE: 42
ADLS_ACUITY_SCORE: 38
ADLS_ACUITY_SCORE: 42
ADLS_ACUITY_SCORE: 45
ADLS_ACUITY_SCORE: 71
ADLS_ACUITY_SCORE: 42
ADLS_ACUITY_SCORE: 71
ADLS_ACUITY_SCORE: 42
ADLS_ACUITY_SCORE: 71
ADLS_ACUITY_SCORE: 42
ADLS_ACUITY_SCORE: 38
ADLS_ACUITY_SCORE: 42
ADLS_ACUITY_SCORE: 71
ADLS_ACUITY_SCORE: 42

## 2025-02-27 NOTE — CONSULTS
Care Management Initial Consult    General Information  Assessment completed with: Patient,    Type of CM/SW Visit: Initial Assessment    Primary Care Provider verified and updated as needed: Yes   Readmission within the last 30 days:        Reason for Consult: discharge planning  Advance Care Planning: Advance Care Planning Reviewed: present on chart, no concerns identified          Communication Assessment  Patient's communication style: spoken language (English or Bilingual)    Hearing Difficulty or Deaf: no   Wear Glasses or Blind: no    Cognitive  Cognitive/Neuro/Behavioral: WDL                      Living Environment:   People in home: alone     Current living Arrangements: condominium      Able to return to prior arrangements:         Family/Social Support:  Care provided by: self  Provides care for:       Support system:            Description of Support System:           Current Resources:   Patient receiving home care services:          Community Resources:    Equipment currently used at home: grab bar, toilet, grab bar, tub/shower, walker, rolling (walker when pt gets sick)  Supplies currently used at home:      Employment/Financial:  Employment Status: retired        Financial Concerns:             Does the patient's insurance plan have a 3 day qualifying hospital stay waiver?  Yes     Which insurance plan 3 day waiver is available? Alternative insurance waiver    Will the waiver be used for post-acute placement? No    Lifestyle & Psychosocial Needs:  Social Drivers of Health     Food Insecurity: Low Risk  (2/27/2025)    Food Insecurity     Within the past 12 months, did you worry that your food would run out before you got money to buy more?: No     Within the past 12 months, did the food you bought just not last and you didn t have money to get more?: No   Depression: Not at risk (4/26/2019)    Received from [facility]    PHQ-2     PHQ-2 Score: 0   Housing Stability: Low Risk  (2/27/2025)     Housing Stability     Do you have housing? : Yes     Are you worried about losing your housing?: No   Tobacco Use: Low Risk  (1/29/2025)    Received from [facility]    Patient History     Smoking Tobacco Use: Never     Smokeless Tobacco Use: Never     Passive Exposure: Not on file   Financial Resource Strain: Low Risk  (2/27/2025)    Financial Resource Strain     Within the past 12 months, have you or your family members you live with been unable to get utilities (heat, electricity) when it was really needed?: No   Alcohol Use: Not on file   Transportation Needs: Low Risk  (2/27/2025)    Transportation Needs     Within the past 12 months, has lack of transportation kept you from medical appointments, getting your medicines, non-medical meetings or appointments, work, or from getting things that you need?: No   Physical Activity: Not on file   Interpersonal Safety: Not on file   Stress: Not on file   Social Connections: Not on file   Health Literacy: Not on file       Functional Status:  Prior to admission patient needed assistance:              Mental Health Status:  Mental Health Status: No Current Concerns       Chemical Dependency Status:  Chemical Dependency Status: No Current Concerns             Values/Beliefs:  Spiritual, Cultural Beliefs, Buddhist Practices, Values that affect care: no               Discussed  Partnership in Safe Discharge Planning  document with patient/family: No    Additional Information:  Initial assessment completed with pt, pt lives in a 55+ twin home community. Pt's son lives about 10 min from pt and assists her as needed.  Discussed recommendation for home care and pt is in agreement.  Pt has no preference regarding home care agency.  Referral made and accepted by [facility].          ANJALI Reyna

## 2025-02-27 NOTE — PROGRESS NOTES
PRIMARY DIAGNOSIS: GENERALIZED WEAKNESS    OUTPATIENT/OBSERVATION GOALS TO BE MET BEFORE DISCHARGE  1. Orthostatic performed: No    2. Tolerating PO medications: Yes    3. Return to near baseline physical activity: No    4. Cleared for discharge by consultants (if involved): No    Discharge Planner Nurse   Safe discharge environment identified: Yes - home w/ family assist  Barriers to discharge: Yes - abx plan       Entered by: Rm Nugent RN 02/26/2025 11:26 PM     Please review provider order for any additional goals.   Nurse to notify provider when observation goals have been met and patient is ready for discharge.

## 2025-02-27 NOTE — PROGRESS NOTES
PRIMARY DIAGNOSIS: GENERALIZED WEAKNESS    OUTPATIENT/OBSERVATION GOALS TO BE MET BEFORE DISCHARGE  1. Orthostatic performed: No    2. Tolerating PO medications: Yes    3. Return to near baseline physical activity: No    4. Cleared for discharge by consultants (if involved): No    Discharge Planner Nurse   Safe discharge environment identified: Yes  Barriers to discharge: Yes       Entered by: Lisa Hendricks RN 02/27/2025 4:22 PM     Please review provider order for any additional goals.   Nurse to notify provider when observation goals have been met and patient is ready for discharge.

## 2025-02-27 NOTE — PROGRESS NOTES
PRIMARY DIAGNOSIS: GENERALIZED WEAKNESS    OUTPATIENT/OBSERVATION GOALS TO BE MET BEFORE DISCHARGE  1. Orthostatic performed: No    2. Tolerating PO medications: Yes    3. Return to near baseline physical activity: No    4. Cleared for discharge by consultants (if involved): No    Discharge Planner Nurse   Safe discharge environment identified: Yes  Barriers to discharge: Yes       Entered by: Lisa Hendricks RN 02/27/2025 4:22 PM     Please review provider order for any additional goals.   Nurse to notify provider when observation goals have been met and patient is ready for discharge.    Patient is A&Ox4. Denies pain. Nausea reported, PRN Ondansetron given, effective. Intermittent tachycardia, MD aware.

## 2025-02-27 NOTE — PROGRESS NOTES
PRIMARY DIAGNOSIS: GENERALIZED WEAKNESS    OUTPATIENT/OBSERVATION GOALS TO BE MET BEFORE DISCHARGE  1. Orthostatic performed: No    2. Tolerating PO medications: Yes    3. Return to near baseline physical activity: No    4. Cleared for discharge by consultants (if involved): No    Discharge Planner Nurse   Safe discharge environment identified: Yes  Barriers to discharge: Yes       Entered by: Lisa Hendricks RN 02/27/2025 4:21 PM     Please review provider order for any additional goals.   Nurse to notify provider when observation goals have been met and patient is ready for discharge.

## 2025-02-27 NOTE — PROGRESS NOTES
Alert and oriented. No pain. No N/V. Instance of chills and shakes overnight. Relieved quickly. Vitals WNL. 1 pivot to commode. Hoping to leave tomorrow with help from family.     PRIMARY DIAGNOSIS: GENERALIZED WEAKNESS    OUTPATIENT/OBSERVATION GOALS TO BE MET BEFORE DISCHARGE  1. Orthostatic performed: No    2. Tolerating PO medications: Yes    3. Return to near baseline physical activity: No    4. Cleared for discharge by consultants (if involved): No    Discharge Planner Nurse   Safe discharge environment identified: Yes - home w/ help  Barriers to discharge: Yes       Entered by: Rm Nugent RN 02/27/2025 4:13 AM     Please review provider order for any additional goals.   Nurse to notify provider when observation goals have been met and patient is ready for discharge.

## 2025-02-27 NOTE — PROGRESS NOTES
[facility] MEDICINE PROGRESS NOTE      Identification/Summary: Lilian La is a 84 year old female with a past medical history of atrial fibrillation, on chronic anticoagulation treatment, temporal arteritis, GERD, anxiety, depression, lives independently, came with diffuse abdominal pain, recurrent nonbloody diarrhea, intractable nausea and vomiting for 2 days.  Her appetite is markedly down.  Found to have UTI.  Started on IV ceftriaxone.  Urine culture grew E. coli.  No further GI symptoms.  Has chronic atrial fibrillation with intermittent RVR.  Responded with IV metoprolol 5 mg once on the day of admission.  Received extra dose of metoprolol 25 mg once today.  On 50 mg twice a day at home.  Will increase metoprolol dose on discharge.    Assessment and Plan:  E. coli UTI  History of recurrent UTI  IV ceftriaxone 1 g daily  CT-Probable urinary tract infection with slight thickening of the renal collecting systems bilaterally.     Acute gastroenteritis likely viral, resolved  No further nausea, vomiting or diarrhea    Chronic atrial fibrillation with recurrent RVR  Received extra dose of metoprolol 25 mg p.o. once in the morning  On metoprolol XL 50 mg daily at home, dose will be increased on discharge    GERD, resume PPI    History of temporal arteritis  Resume prednisone    Code DNR  DVT prophylaxis  On chronic anticoagulation treatment    Barrier to discharge  Still has intermittent tachycardia    Medically Ready for Discharge: Anticipated Tomorrow        Clinically Significant Risk Factors Present on Admission          # Hypochloremia: Lowest Cl = 97 mmol/L in last 2 days, will monitor as appropriate       # Drug Induced Coagulation Defect: home medication list includes an anticoagulant medication                           Saira Del Rio MD  Hospitalist  [facility] Medicine  [facility]  Phone: #[phone]  Securely message with the Vocera Web Console  (learn more here)  Text page via [facility] Paging/Directory     Interval History/Subjective:  Resting comfortably.  Heart rate goes high up to 140s.  Patient is asymptomatic.  No chest pain, palpitation or short of breath.  Afebrile.  Appetite improves.  No further nausea, vomiting, diarrhea.  No urinary symptoms.    Physical Exam/Objective:  Temp:  [97.3  F (36.3  C)-99.9  F (37.7  C)] 98.2  F (36.8  C)  Pulse:  [] 102  Resp:  [14-52] 18  BP: (102-136)/(54-80) 102/68  SpO2:  [89 %-95 %] 93 %  Body mass index is 21.44 kg/m .    GENERAL:  Alert, appears comfortable, in no acute distress, appears stated age   HEAD:  Normocephalic, without obvious abnormality, atraumatic   EYES:  PERRL, conjunctiva  clear,  EOM's intact   NOSE: Nares normal,  mucosa normal, no drainage   THROAT: Lips, mucosa,  gums normal, mouth moist   NECK: Supple, symmetrical, trachea midline   BACK:   Symmetric, no curvature, ROM normal   LUNGS:   Clear to auscultation bilaterally, no rales, rhonchi, or wheezing, symmetric chest rise on inhalation, respirations unlabored   CHEST WALL:  No tenderness or deformity   HEART:  Irregular rhythm, tachycardia, S1 and S2 normal, no murmur    ABDOMEN:   Soft, non-tender, bowel sounds active , no masses, no organomegaly, no rebound or guarding   EXTREMITIES: No BLE edema    SKIN: No rashes in the visualized areas   NEURO: Alert, oriented x3, moves all four extremities freely   PSYCH: Cooperative, behavior is appropriate      Data reviewed today: I personally reviewed all new medications, labs, imaging/diagnostics reports over the past 24 hours. Pertinent findings include:    Imaging:   Abdomen ct  1.  Probable urinary tract infection with slight thickening of the renal collecting systems bilaterally.  2.  Nothing for pyelonephritis. No abscesses.  3.  Diverticulosis but nothing for diverticulitis.    Labs:  Most Recent 3 CBC's:  Recent Labs   Lab Test 02/25/25  1517 09/12/24  1624 07/09/23  1405   WBC 14.3*  6.9 9.4   HGB 11.9 12.4 10.8*   MCV 95 91 99    182 270     Most Recent 3 BMP's:  Recent Labs   Lab Test 02/26/25  1310 02/25/25  1517 09/12/24  1624 07/09/23  1405   NA  --  137 137 141   POTASSIUM 3.8 3.7 5.0 3.4*   CHLORIDE  --  97* 97* 99   CO2  --  25 24 29   BUN  --  19.9 12.9 16   CR  --  0.84 0.68 0.87   ANIONGAP  --  15 16* 13   YIN  --  9.9 8.8 9.9   GLC  --  132* 83 146*     Most Recent 2 LFT's:  Recent Labs   Lab Test 02/25/25  1517 07/09/23  1405   AST 26 26   ALT 13 49*   ALKPHOS 112 132*   BILITOTAL 2.4* 1.5*     Urine culture 50,000-100,000 CFU/mL Escherichia coli            Medications:   Personally Reviewed.  Medications   Current Facility-Administered Medications   Medication Dose Route Frequency Provider Last Rate Last Admin    Patient is already receiving anticoagulation with heparin, enoxaparin (LOVENOX), warfarin (COUMADIN)  or other anticoagulant medication   Does not apply Continuous PRN Saira Del Rio MD         Current Facility-Administered Medications   Medication Dose Route Frequency Provider Last Rate Last Admin    apixaban ANTICOAGULANT (ELIQUIS) tablet 5 mg  5 mg Oral BID Saira Del Rio MD   5 mg at 02/27/25 0855    calcium carbonate-vitamin D (OSCAL) 500-5 MG-MCG per tablet 1 tablet  1 tablet Oral Daily Saira Del Rio MD   1 tablet at 02/27/25 0855    cefTRIAXone (ROCEPHIN) 1 g vial to attach to  mL bag for ADULTS or NS 50 mL bag for PEDS  1 g Intravenous Q24H Saira Del Rio MD   1 g at 02/26/25 1726    cyanocobalamin (VITAMIN B-12) tablet 500 mcg  500 mcg Oral QAM Saira Del Rio MD   500 mcg at 02/27/25 0916    metoprolol succinate ER (TOPROL XL) 24 hr tablet 50 mg  50 mg Oral At Bedtime Eladio Strauss MD   50 mg at 02/26/25 2201    metoprolol tartrate (LOPRESSOR) half-tab 12.5 mg  12.5 mg Oral Once Saira Del Rio MD        predniSONE (DELTASONE) tablet 6 mg  6 mg Oral Daily Saira Del Rio MD   6 mg at 02/27/25 0916    sertraline (ZOLOFT) tablet 50 mg  50 mg Oral  QAM Saira Del Rio MD   50 mg at 02/27/25 0984    sodium chloride (PF) 0.9% PF flush 3 mL  3 mL Intracatheter Q8H Saira Del Rio MD        traZODone (DESYREL) tablet 50 mg  50 mg Oral At Bedtime Saira Del Rio MD   50 mg at 02/26/25 5174      Total time spent 50 min

## 2025-02-27 NOTE — PROVIDER NOTIFICATION
Pulse was altering between 70 and 143 due to atrial fibrillation with RVR. MD notified . Metoprolol ordered.

## 2025-02-27 NOTE — PLAN OF CARE
Goal Outcome Evaluation:           Overall Patient Progress: improvingOverall Patient Progress: improving    Outcome Evaluation: Home with home care

## 2025-02-28 ENCOUNTER — APPOINTMENT (OUTPATIENT)
Dept: PHYSICAL THERAPY | Facility: CLINIC | Age: 85
End: 2025-02-28
Payer: COMMERCIAL

## 2025-02-28 VITALS
WEIGHT: 136.91 LBS | BODY MASS INDEX: 21.49 KG/M2 | OXYGEN SATURATION: 98 % | HEART RATE: 121 BPM | HEIGHT: 67 IN | SYSTOLIC BLOOD PRESSURE: 135 MMHG | RESPIRATION RATE: 18 BRPM | TEMPERATURE: 98.1 F | DIASTOLIC BLOOD PRESSURE: 63 MMHG

## 2025-02-28 PROCEDURE — 250N000013 HC RX MED GY IP 250 OP 250 PS 637: Performed by: INTERNAL MEDICINE

## 2025-02-28 PROCEDURE — 99239 HOSP IP/OBS DSCHRG MGMT >30: CPT | Performed by: FAMILY MEDICINE

## 2025-02-28 PROCEDURE — 96376 TX/PRO/DX INJ SAME DRUG ADON: CPT

## 2025-02-28 PROCEDURE — 99207 PR NO BILLABLE SERVICE THIS VISIT: CPT | Performed by: FAMILY MEDICINE

## 2025-02-28 PROCEDURE — 97530 THERAPEUTIC ACTIVITIES: CPT | Mod: GP

## 2025-02-28 PROCEDURE — 250N000011 HC RX IP 250 OP 636: Performed by: FAMILY MEDICINE

## 2025-02-28 PROCEDURE — 250N000013 HC RX MED GY IP 250 OP 250 PS 637: Performed by: FAMILY MEDICINE

## 2025-02-28 PROCEDURE — 250N000009 HC RX 250: Performed by: FAMILY MEDICINE

## 2025-02-28 PROCEDURE — 97110 THERAPEUTIC EXERCISES: CPT | Mod: GP

## 2025-02-28 PROCEDURE — 250N000013 HC RX MED GY IP 250 OP 250 PS 637

## 2025-02-28 PROCEDURE — G0378 HOSPITAL OBSERVATION PER HR: HCPCS

## 2025-02-28 PROCEDURE — 250N000012 HC RX MED GY IP 250 OP 636 PS 637: Performed by: FAMILY MEDICINE

## 2025-02-28 RX ORDER — FUROSEMIDE 20 MG/1
20 TABLET ORAL DAILY
Status: DISCONTINUED | OUTPATIENT
Start: 2025-02-28 | End: 2025-03-01 | Stop reason: HOSPADM

## 2025-02-28 RX ORDER — METOPROLOL SUCCINATE 25 MG/1
50 TABLET, EXTENDED RELEASE ORAL 2 TIMES DAILY
Status: DISCONTINUED | OUTPATIENT
Start: 2025-02-28 | End: 2025-03-01 | Stop reason: HOSPADM

## 2025-02-28 RX ORDER — METOPROLOL SUCCINATE 25 MG/1
25 TABLET, EXTENDED RELEASE ORAL DAILY
Status: DISCONTINUED | OUTPATIENT
Start: 2025-02-28 | End: 2025-02-28

## 2025-02-28 RX ORDER — AMOXICILLIN 250 MG
1 CAPSULE ORAL DAILY PRN
Status: DISCONTINUED | OUTPATIENT
Start: 2025-02-28 | End: 2025-03-01 | Stop reason: HOSPADM

## 2025-02-28 RX ORDER — METOPROLOL TARTRATE 1 MG/ML
2.5 INJECTION, SOLUTION INTRAVENOUS ONCE
Status: COMPLETED | OUTPATIENT
Start: 2025-02-28 | End: 2025-02-28

## 2025-02-28 RX ORDER — CEFDINIR 300 MG/1
300 CAPSULE ORAL 2 TIMES DAILY
Qty: 12 CAPSULE | Refills: 0 | Status: SHIPPED | OUTPATIENT
Start: 2025-02-28 | End: 2025-03-06

## 2025-02-28 RX ADMIN — CEFTRIAXONE 1 G: 1 INJECTION, POWDER, FOR SOLUTION INTRAMUSCULAR; INTRAVENOUS at 19:09

## 2025-02-28 RX ADMIN — METOPROLOL TARTRATE 2.5 MG: 5 INJECTION INTRAVENOUS at 12:39

## 2025-02-28 RX ADMIN — APIXABAN 5 MG: 5 TABLET, FILM COATED ORAL at 21:42

## 2025-02-28 RX ADMIN — APIXABAN 5 MG: 5 TABLET, FILM COATED ORAL at 07:54

## 2025-02-28 RX ADMIN — Medication 1 TABLET: at 07:54

## 2025-02-28 RX ADMIN — SERTRALINE HYDROCHLORIDE 50 MG: 25 TABLET ORAL at 07:53

## 2025-02-28 RX ADMIN — ACETAMINOPHEN 650 MG: 325 TABLET ORAL at 03:06

## 2025-02-28 RX ADMIN — METOPROLOL SUCCINATE 25 MG: 25 TABLET, EXTENDED RELEASE ORAL at 10:04

## 2025-02-28 RX ADMIN — SENNOSIDES AND DOCUSATE SODIUM 1 TABLET: 50; 8.6 TABLET ORAL at 21:42

## 2025-02-28 RX ADMIN — FUROSEMIDE 20 MG: 20 TABLET ORAL at 12:03

## 2025-02-28 RX ADMIN — CYANOCOBALAMIN TAB 500 MCG 500 MCG: 500 TAB at 07:54

## 2025-02-28 RX ADMIN — METOPROLOL SUCCINATE 50 MG: 25 TABLET, EXTENDED RELEASE ORAL at 21:42

## 2025-02-28 RX ADMIN — ACETAMINOPHEN 650 MG: 325 TABLET ORAL at 22:51

## 2025-02-28 RX ADMIN — PREDNISONE 6 MG: 1 TABLET ORAL at 07:54

## 2025-02-28 RX ADMIN — TRAZODONE HYDROCHLORIDE 50 MG: 50 TABLET ORAL at 21:41

## 2025-02-28 ASSESSMENT — ACTIVITIES OF DAILY LIVING (ADL)
ADLS_ACUITY_SCORE: 41

## 2025-02-28 NOTE — PROGRESS NOTES
[facility] MEDICINE PROGRESS NOTE      Identification/Summary: Lilian La is a 84 year old female with a past medical history of atrial fibrillation, on chronic anticoagulation treatment, temporal arteritis, GERD, anxiety, depression, lives independently, came with diffuse abdominal pain, recurrent nonbloody diarrhea, intractable nausea and vomiting for 2 days.  Her appetite is markedly down.  Found to have UTI.  Started on IV ceftriaxone.  Urine culture grew E. coli.  No further GI symptoms.  Has chronic atrial fibrillation with intermittent RVR.  Responded with IV metoprolol 5 mg once on the day of admission.  P.o. metoprolol dose increased to 50 mg twice a day.  Still has intermittent tachycardia.  Will monitor today.      Assessment and Plan:  E. coli UTI  History of recurrent UTI  IV ceftriaxone 1 g daily   CT-Probable urinary tract infection with slight thickening of the renal collecting systems bilaterally.     Acute gastroenteritis likely viral, resolved  No further nausea, vomiting or diarrhea    Chronic atrial fibrillation with recurrent RVR  IV metoprolol 2.5 mg once today  Status post p.o. metoprolol XL 25 mg once today  Will increase p.o. metoprolol XL 50 mg twice a day  Continue telemetry monitoring    Chronic diastolic dysfunction  No acute pulmonary congestion  Echocardiogram-severe biatrial enlargement, right pulmonary hypertension, EF 55 to 60%  Lasix 20 mg daily started on 2/28    GERD, resume PPI    History of temporal arteritis  Resume prednisone    Code DNR  DVT prophylaxis  On chronic anticoagulation treatment    Barrier to discharge  Still has intermittent tachycardia    Medically Ready for Discharge: Anticipated Tomorrow        Clinically Significant Risk Factors Present on Admission                  # Drug Induced Coagulation Defect: home medication list includes an anticoagulant medication                           Saira Del Rio MD  [facility]  [facility]  Phone: #[phone]  Securely message with the [employer] Web Console (learn more here)  Text page via [employer] Paging/Directory     Interval History/Subjective:  Resting comfortably.  Heart rate goes high up to 140s.  Patient is asymptomatic.  No chest pain, palpitation or short of breath.  Afebrile.  Appetite improves.  No further nausea, vomiting, diarrhea.  No urinary symptoms.    Physical Exam/Objective:  Temp:  [97.8  F (36.6  C)-98.6  F (37  C)] 98.6  F (37  C)  Pulse:  [] 109  Resp:  [16-24] 16  BP: (106-135)/(61-91) 130/91  SpO2:  [92 %-98 %] 93 %  Body mass index is 21.46 kg/m .    GENERAL:  Alert, appears comfortable, in no acute distress, appears stated age   HEAD:  Normocephalic, without obvious abnormality, atraumatic   EYES:  PERRL, conjunctiva  clear,  EOM's intact   NOSE: Nares normal,  mucosa normal, no drainage   THROAT: Lips, mucosa,  gums normal, mouth moist   NECK: Supple, symmetrical, trachea midline   BACK:   Symmetric, no curvature, ROM normal   LUNGS:   Clear to auscultation bilaterally, no rales, rhonchi, or wheezing, symmetric chest rise on inhalation, respirations unlabored   CHEST WALL:  No tenderness or deformity   HEART:  Irregular rhythm, tachycardia, S1 and S2 normal, no murmur    ABDOMEN:   Soft, non-tender, bowel sounds active , no masses, no organomegaly, no rebound or guarding   EXTREMITIES: No BLE edema    SKIN: No rashes in the visualized areas   NEURO: Alert, oriented x3, moves all four extremities freely   PSYCH: Cooperative, behavior is appropriate      Data reviewed today: I personally reviewed all new medications, labs, imaging/diagnostics reports over the past 24 hours. Pertinent findings include:    Imaging:   Abdomen ct  1.  Probable urinary tract infection with slight thickening of the renal collecting systems bilaterally.  2.  Nothing for pyelonephritis. No abscesses.  3.  Diverticulosis but nothing for  diverticulitis.    Echocardiogram  1. Left ventricular chamber size is normal. Mild concentric increase in wall  thickness. Systolic function is normal. The visually estimated left  ventricular ejection fraction is 55-60%.  2. Right ventricular chamber size is mildly enlarged. Systolic function is  mildly reduced.  3. Severe biatrial enlargement.  4. Calcified trileaflet aortic valve with moderate-to-severe aortic stenosis  with paradoxical low flow and low gradient. Peak forward velocity measures 2.7  m/s, mean outflow gradient 19 mmHg, calculated aortic valve area 0.7 cm2 and  dimensionless index 0.28. Trace regurgitation is present.  5. Moderate pulmonary hypertension is present with an estimated pulmonary  artery systolic pressure of 60 mmHg.    Labs:  Most Recent 3 CBC's:  Recent Labs   Lab Test 02/25/25  1517 09/12/24  1624 07/09/23  1405   WBC 14.3* 6.9 9.4   HGB 11.9 12.4 10.8*   MCV 95 91 99    182 270     Most Recent 3 BMP's:  Recent Labs   Lab Test 02/26/25  1310 02/25/25  1517 09/12/24  1624 07/09/23  1405   NA  --  137 137 141   POTASSIUM 3.8 3.7 5.0 3.4*   CHLORIDE  --  97* 97* 99   CO2  --  25 24 29   BUN  --  19.9 12.9 16   CR  --  0.84 0.68 0.87   ANIONGAP  --  15 16* 13   YIN  --  9.9 8.8 9.9   GLC  --  132* 83 146*     Most Recent 2 LFT's:  Recent Labs   Lab Test 02/25/25  1517 07/09/23  1405   AST 26 26   ALT 13 49*   ALKPHOS 112 132*   BILITOTAL 2.4* 1.5*     Urine culture 50,000-100,000 CFU/mL Escherichia coli            Medications:   Personally Reviewed.  Medications   Current Facility-Administered Medications   Medication Dose Route Frequency Provider Last Rate Last Admin    Patient is already receiving anticoagulation with heparin, enoxaparin (LOVENOX), warfarin (COUMADIN)  or other anticoagulant medication   Does not apply Continuous PRN Saira Del Rio MD         Current Facility-Administered Medications   Medication Dose Route Frequency Provider Last Rate Last Admin    apixaban  ANTICOAGULANT (ELIQUIS) tablet 5 mg  5 mg Oral BID Saira Del Rio MD   5 mg at 02/28/25 0754    calcium carbonate-vitamin D (OSCAL) 500-5 MG-MCG per tablet 1 tablet  1 tablet Oral Daily Saira Del Rio MD   1 tablet at 02/28/25 0754    cefTRIAXone (ROCEPHIN) 1 g vial to attach to  mL bag for ADULTS or NS 50 mL bag for PEDS  1 g Intravenous Q24H Saira Del Rio MD   1 g at 02/27/25 1806    cyanocobalamin (VITAMIN B-12) tablet 500 mcg  500 mcg Oral QAM Saira Del Rio MD   500 mcg at 02/28/25 0754    furosemide (LASIX) tablet 20 mg  20 mg Oral Daily Saira Del Rio MD   20 mg at 02/28/25 1203    metoprolol (LOPRESSOR) injection 2.5 mg  2.5 mg Intravenous Once Saira Del Rio MD        metoprolol succinate ER (TOPROL XL) 24 hr tablet 50 mg  50 mg Oral BID Saira Del Rio MD        predniSONE (DELTASONE) tablet 6 mg  6 mg Oral Daily Saira Del Rio MD   6 mg at 02/28/25 0754    sertraline (ZOLOFT) tablet 50 mg  50 mg Oral ARCHIE Saira Del Rio MD   50 mg at 02/28/25 0753    sodium chloride (PF) 0.9% PF flush 3 mL  3 mL Intracatheter Q8H Saira Del Rio MD   3 mL at 02/28/25 0754    traZODone (DESYREL) tablet 50 mg  50 mg Oral At Bedtime Saira Del Rio MD   50 mg at 02/27/25 2110      Total time spent 50 min

## 2025-02-28 NOTE — PROGRESS NOTES
"PRIMARY DIAGNOSIS: \"GENERIC\" NURSING  OUTPATIENT/OBSERVATION GOALS TO BE MET BEFORE DISCHARGE:  ADLs back to baseline: No    Activity and level of assistance: Up with standby assistance.    Pain status: Pain free.    Return to near baseline physical activity: No     Discharge Planner Nurse   Safe discharge environment identified: No  Barriers to discharge: Yes       Entered by: Cynthia Bran RN 02/28/2025 1:12 PM     Please review provider order for any additional goals.   Nurse to notify provider when observation goals have been met and patient is ready for discharge.  "

## 2025-02-28 NOTE — PROGRESS NOTES
Physical Therapy Discharge Summary    Reason for therapy discharge:    All goals and outcomes met, no further needs identified.    Progress towards therapy goal(s). See goals on Care Plan in [facility] electronic health record for goal details.  Goals met    Therapy recommendation(s):    Continued therapy is recommended.  Rationale/Recommendations:  Recommend home with use of 2WW AAT. Recommend home PT to wean from 2WW, progress strengthening and endurance.

## 2025-02-28 NOTE — PLAN OF CARE
"PRIMARY DIAGNOSIS: \"GENERIC\" NURSING  OUTPATIENT/OBSERVATION GOALS TO BE MET BEFORE DISCHARGE:  ADLs back to baseline: Yes    Activity and level of assistance: Up with standby assistance.    Pain status: Improved-controlled with oral pain medications.    Return to near baseline physical activity: Yes     Discharge Planner Nurse   Safe discharge environment identified: Yes  Barriers to discharge: Yes        Patient A&O.  VSS.  Denies nausea and SOB.  Has chronic pain in her right shoulder, pain tolerable at this time, no intervention necessary.  Up SBA to the bathroom.  Alarms on for safety.                          "

## 2025-02-28 NOTE — PROGRESS NOTES
"PRIMARY DIAGNOSIS: \"GENERIC\" NURSING  OUTPATIENT/OBSERVATION GOALS TO BE MET BEFORE DISCHARGE:  ADLs back to baseline: Yes    Activity and level of assistance: Up with standby assistance.    Pain status: Pain free.    Return to near baseline physical activity: Yes     Discharge Planner Nurse   Safe discharge environment identified: Yes  Barriers to discharge: No       Entered by: Cynthia Bran RN 02/28/2025 10:45 AM     Please review provider order for any additional goals.   Nurse to notify provider when observation goals have been met and patient is ready for discharge.  "

## 2025-02-28 NOTE — PLAN OF CARE
Patient alert and orientated. Bed alarm on, does call appropriately. No loose stools this shift. Void x1, missed hat. Assist of one with gait belt and walker. Complained of pain to right shoulder and arm, states related to arthritis, declined pain medication states no pain with rest. Metoprolol help this evening as BP did not meet the order parameters. Will continue to monitor on tele and notify oncoming if needing to give.     Problem: Adult Inpatient Plan of Care  Goal: Optimal Comfort and Wellbeing  Outcome: Progressing  Intervention: Monitor Pain and Promote Comfort  Recent Flowsheet Documentation  Taken 2/27/2025 2116 by Jade Ventura, RN  Pain Management Interventions: rest     Problem: Adult Inpatient Plan of Care  Goal: Readiness for Transition of Care  Outcome: Progressing   Goal Outcome Evaluation:

## 2025-02-28 NOTE — PLAN OF CARE
"PRIMARY DIAGNOSIS: \"GENERIC\" NURSING  OUTPATIENT/OBSERVATION GOALS TO BE MET BEFORE DISCHARGE:  ADLs back to baseline: Yes    Activity and level of assistance: Up with standby assistance.    Pain status: Improved-controlled with oral pain medications.    Return to near baseline physical activity: Yes     Discharge Planner Nurse   Safe discharge environment identified: Yes  Barriers to discharge: Yes        Patient A&O.  VSS.  Denies nausea and SOB.  Has chronic pain in her right shoulder, tylenol given.  Up SBA to the bathroom.  Alarms on for safety.                          "

## 2025-02-28 NOTE — DISCHARGE SUMMARY
[facility] MEDICINE  DISCHARGE SUMMARY     Primary Care Physician: Francisco J Vergara  Admission Date: 2/25/2025   Discharge Provider: Saira Del Rio MD Discharge Date: 2/28/2025   Diet:   Low salt diet   Code Status: No CPR- Do NOT Intubate   Activity: DCACTIVITY: Activity as tolerated        Condition at Discharge: Stable     REASON FOR PRESENTATION(See Admission Note for Details)   Nausea, vomiting, nonbloody diarrhea for 2 days associated with weakness found to have A-fib with RVR in the emergency department    PRINCIPAL & ACTIVE DISCHARGE DIAGNOSES   E. coli UTI  History of recurrent UTI  Acute gastroenteritis likely viral, resolved  Chronic atrial fibrillation with AVR  Moderate pulmonary hypertension  Severe biatrial enlargement  Chronic diastolic CHF, not in exacerbation  GERD  History of temporal arteritis      PENDING LABS     Unresulted Labs Ordered in the Past 30 Days of this Admission       No orders found from 1/26/2025 to 2/26/2025.             PROCEDURES ( this hospitalization only)      None    RECOMMENDATIONS TO OUTPATIENT PROVIDER FOR F/U VISIT     Follow-up Appointments       Follow-up and recommended labs and tests       Primary care physician in a week  CBC and BMP in 1 week  Follow-up with cardiology/A-fib clinic in 2 weeks                 DISPOSITION     Home with home care    SUMMARY OF HOSPITAL COURSE:      Ms.Patricia MARILOU La is a 84 year old female with a past medical history of atrial fibrillation, on chronic anticoagulation treatment, temporal arteritis, GERD, anxiety, depression, lives independently, came with diffuse abdominal pain, recurrent nonbloody diarrhea, intractable nausea and vomiting for 2 days.  Her appetite was markedly down.  Found to have UTI.  Started on IV ceftriaxone.  Urine culture grew E. coli which is pan susceptible except fluoroquinolone.  Will continue Omnicef to complete the course. Denied urinary symptoms..  No further GI  symptoms.  Was tolerating p.o. well.  Has chronic atrial fibrillation with intermittent RVR.  Required straight doses of metoprolol for tachycardia.  Heart rate is much improved afterwards.  Will resume home dose metoprolol XL 50 mg daily.  Follow-up with cardiology A-fib clinic closely.  Echocardiogram revealed EF 55 to 60%, severe biatrial enlargement, moderate pulmonary hypertension.  No CHF exacerbation at present.  Recommended low-salt diet and started on Lasix 20 mg every other day. Will follow-up closely with primary care physician.  Discharging home with home care.    Discharge Medications with Med changes:     Current Discharge Medication List        START taking these medications    Details   cefdinir (OMNICEF) 300 MG capsule Take 1 capsule (300 mg) by mouth 2 times daily for 6 days.  Qty: 12 capsule, Refills: 0    Associated Diagnoses: Urinary tract infection without hematuria, site unspecified      furosemide (LASIX) 20 MG tablet Take 1 tablet (20 mg) by mouth every other day.  Qty: 45 tablet, Refills: 1    Associated Diagnoses: Diastolic dysfunction           CONTINUE these medications which have NOT CHANGED    Details   acetaminophen (TYLENOL) 500 MG tablet Take 1,000 mg by mouth At Bedtime      apixaban ANTICOAGULANT (ELIQUIS) 5 MG tablet Take 5 mg by mouth 2 times daily      Calcium Carb-Cholecalciferol 600-20 MG-MCG TABS Take 1 tablet by mouth daily      cyanocobalamin (VITAMIN B-12) 500 MCG SUBL sublingual tablet Place 500 mcg under the tongue every morning      denosumab (PROLIA) 60 MG/ML SOSY injection Inject 60 mg Subcutaneous every 6 months      diclofenac (VOLTAREN) 1 % topical gel Apply 2 g topically 4 times daily as needed      diphenoxylate-atropine (LOMOTIL) 2.5-0.025 MG tablet Take 1 tablet by mouth 4 times daily as needed for diarrhea.      glucosamine-chondroitin 500-400 MG CAPS per capsule Take 2 capsules by mouth every morning      hydrOXYzine HCl (ATARAX) 10 MG tablet Take 10 mg by  mouth 3 times daily as needed for anxiety.      LANsoprazole (PREVACID) 15 MG DR capsule Take 15 mg by mouth every morning      metoprolol succinate ER (TOPROL XL) 50 MG 24 hr tablet Take 50 mg by mouth At Bedtime      predniSONE (DELTASONE) 1 MG tablet Take 6 mg by mouth daily. Adjust as directed      sertraline (ZOLOFT) 50 MG tablet Take 50 mg by mouth every morning      traZODone (DESYREL) 50 MG tablet Take 50 mg by mouth At Bedtime                   Rationale for medication changes:      Cefdinir to complete the course for UTI  Lasix 20 mg  every other day      Consults   PT/OT    Immunizations given this encounter     Most Recent Immunizations   Administered Date(s) Administered    COVID-19 12+ (Pfizer) 12/05/2024    COVID-19 Bivalent 18+ (Moderna) 11/18/2022    COVID-19 MONOVALENT 12+ (Pfizer) 09/16/2021    COVID-19 Monovalent 18+ (Moderna) 08/05/2022    Flu 65+ (Fluad) 11/13/2024    Influenza (intradermal) 09/17/2020    Pneumo Conj 13-V (2010&after) 09/14/2016    Pneumococcal 23 valent 07/09/2008    TD,PF 7+ (Tenivac) 12/18/1995    TDAP Vaccine (Adacel) 06/27/2018    Td (Adult), Adsorbed 09/07/2005    Zoster vaccine, live 08/17/2020           Anticoagulation Information      Eliquis 5 mg twice a day      SIGNIFICANT IMAGING FINDINGS      Abdomen ct  1.  Probable urinary tract infection with slight thickening of the renal collecting systems bilaterally.  2.  Nothing for pyelonephritis. No abscesses.  3.  Diverticulosis but nothing for diverticulitis.    Echocardiogram  1. Left ventricular chamber size is normal. Mild concentric increase in wall  thickness. Systolic function is normal. The visually estimated left  ventricular ejection fraction is 55-60%.  2. Right ventricular chamber size is mildly enlarged. Systolic function is  mildly reduced.  3. Severe biatrial enlargement.  4. Calcified trileaflet aortic valve with moderate-to-severe aortic stenosis  with paradoxical low flow and low gradient. Peak forward  velocity measures 2.7  m/s, mean outflow gradient 19 mmHg, calculated aortic valve area 0.7 cm2 and  dimensionless index 0.28. Trace regurgitation is present.  5. Moderate pulmonary hypertension is present with an estimated pulmonary  artery systolic pressure of 60 mmHg.    SIGNIFICANT LABORATORY FINDINGS     Stool studies negative  Urine culture has E. Coli, she is pan susceptible except fluoroquinolone  Sodium 137, potassium 3.7, creatinine 0.84  Hemoglobin 11.9    Discharge Orders        Home Care Referral      Adult Cardiology Eval  Referral      Follow-Up with Cardiology      Reason for your hospital stay    Nausea, vomiting, nonbloody diarrhea for 2 days associated with weakness and decreased appetite found to have UTI and A-fib with RVR     Follow-up and recommended labs and tests     Primary care physician in a week  CBC and BMP in 1 week  Follow-up with cardiology/A-fib clinic in 2 weeks     Activity    Your activity upon discharge: activity as tolerated     Diet    Follow this diet upon discharge: regular diet       Examination   Physical Exam   Temp:  [96.7  F (35.9  C)-98.6  F (37  C)] 98.6  F (37  C)  Pulse:  [] 100  Resp:  [16] 16  BP: (103-135)/(55-91) 118/72  SpO2:  [92 %-98 %] 96 %  Wt Readings from Last 1 Encounters:   02/28/25 62.1 kg (137 lb)     GENERAL:  Alert, appears comfortable, in no acute distress, appears stated age   HEAD:  Normocephalic, without obvious abnormality, atraumatic   EYES:  PERRL, conjunctiva  clear,  EOM's intact   NOSE: Nares normal,  mucosa normal, no drainage   THROAT: Lips, mucosa,  gums normal, mouth moist   NECK: Supple, symmetrical, trachea midline   BACK:   Symmetric, no curvature, ROM normal   LUNGS:   Clear to auscultation bilaterally, no rales, rhonchi, or wheezing, symmetric chest rise on inhalation, respirations unlabored   CHEST WALL:  No tenderness or deformity   HEART:  Irregular rhythm, normal rate, S1 and S2 normal    ABDOMEN:   Soft,  non-tender, bowel sounds active , no masses, no organomegaly, no rebound or guarding   EXTREMITIES: No BLE edema    SKIN: No rashes in the visualized areas   NEURO: Alert, oriented x3, moves all four extremities freely   PSYCH: Cooperative, behavior is appropriate        Please see EMR for more detailed significant labs, imaging, consultant notes etc.    ISaira MD, personally saw the patient today and spent greater than 30 minutes discharging this patient.    Saira Del Rio MD  [facility]    CC:Francisco J Vergara

## 2025-03-01 VITALS
DIASTOLIC BLOOD PRESSURE: 72 MMHG | WEIGHT: 137 LBS | OXYGEN SATURATION: 96 % | HEIGHT: 67 IN | RESPIRATION RATE: 16 BRPM | BODY MASS INDEX: 21.5 KG/M2 | TEMPERATURE: 98.6 F | SYSTOLIC BLOOD PRESSURE: 118 MMHG | HEART RATE: 100 BPM

## 2025-03-01 PROCEDURE — G0378 HOSPITAL OBSERVATION PER HR: HCPCS

## 2025-03-01 PROCEDURE — 250N000013 HC RX MED GY IP 250 OP 250 PS 637: Performed by: FAMILY MEDICINE

## 2025-03-01 PROCEDURE — 250N000012 HC RX MED GY IP 250 OP 636 PS 637: Performed by: FAMILY MEDICINE

## 2025-03-01 RX ORDER — POLYETHYLENE GLYCOL 3350 17 G/17G
1 POWDER, FOR SOLUTION ORAL DAILY PRN
Qty: 15 PACKET | Refills: 1 | Status: SHIPPED | OUTPATIENT
Start: 2025-03-01

## 2025-03-01 RX ORDER — FUROSEMIDE 20 MG/1
20 TABLET ORAL EVERY OTHER DAY
Qty: 45 TABLET | Refills: 1 | Status: SHIPPED | OUTPATIENT
Start: 2025-03-01

## 2025-03-01 RX ADMIN — CYANOCOBALAMIN TAB 500 MCG 500 MCG: 500 TAB at 08:39

## 2025-03-01 RX ADMIN — METOPROLOL SUCCINATE 50 MG: 25 TABLET, EXTENDED RELEASE ORAL at 08:39

## 2025-03-01 RX ADMIN — Medication 1 TABLET: at 08:39

## 2025-03-01 RX ADMIN — FUROSEMIDE 20 MG: 20 TABLET ORAL at 08:39

## 2025-03-01 RX ADMIN — APIXABAN 5 MG: 5 TABLET, FILM COATED ORAL at 08:39

## 2025-03-01 RX ADMIN — PREDNISONE 6 MG: 1 TABLET ORAL at 08:39

## 2025-03-01 RX ADMIN — SERTRALINE HYDROCHLORIDE 50 MG: 25 TABLET ORAL at 08:39

## 2025-03-01 ASSESSMENT — ACTIVITIES OF DAILY LIVING (ADL)
ADLS_ACUITY_SCORE: 35
ADLS_ACUITY_SCORE: 35
ADLS_ACUITY_SCORE: 39
ADLS_ACUITY_SCORE: 35
ADLS_ACUITY_SCORE: 35
ADLS_ACUITY_SCORE: 39
ADLS_ACUITY_SCORE: 39
ADLS_ACUITY_SCORE: 35
ADLS_ACUITY_SCORE: 35
ADLS_ACUITY_SCORE: 39
ADLS_ACUITY_SCORE: 35

## 2025-03-01 NOTE — PLAN OF CARE
Problem: Adult Inpatient Plan of Care  Goal: Plan of Care Review  Description: The Plan of Care Review/Shift note should be completed every shift.  The Outcome Evaluation is a brief statement about your assessment that the patient is improving, declining, or no change.  This information will be displayed automatically on your shift  note.  Outcome: Progressing   Goal Outcome Evaluation:  VSS and afebrile. HR stable overnight. No complaints of pain overnight. Had BM. Hopeful for discharge home today.

## 2025-03-01 NOTE — PROGRESS NOTES
PRIMARY DIAGNOSIS: Atrial fibrillation with rapid ventricular response   OUTPATIENT/OBSERVATION GOALS TO BE MET BEFORE DISCHARGE:  ADLs back to baseline: Yes    Activity and level of assistance: Up with standby assistance.    Pain status: Improved-controlled with oral pain medications.    Return to near baseline physical activity: Yes     Discharge Planner Nurse   Safe discharge environment identified: Yes  Barriers to discharge: No       Entered by: Jarad Nowak RN 02/28/2025    Pt A&Ox4. Calm and cooperative. VSS on RA. Denies n/v and SOB. Pain controlled with PRN Tylenol. On Tele monitor. PRN Senna given for constipation. Pt had small hard stool this shift. Ax1 to bathroom with walker and gait belt. Bed alarm on for safety and in low position.       Please review provider order for any additional goals.   Nurse to notify provider when observation goals have been met and patient is ready for discharge.

## 2025-03-01 NOTE — PROGRESS NOTES
Care Management Discharge Note    Discharge Date: 03/01/2025     Discharge Disposition: Home, Home Care    Discharge Services: None    Discharge DME: None    Discharge Transportation: car, drives self    Private pay costs discussed: Not applicable    Does the patient's insurance plan have a 3 day qualifying hospital stay waiver?  No    PAS Confirmation Code:    Patient/family educated on Medicare website which has current facility and service quality ratings: no    Education Provided on the Discharge Plan: Yes  Persons Notified of Discharge Plans: yes  Patient/Family in Agreement with the Plan: yes    Handoff Referral Completed: No, handoff not indicated or clinically appropriate    Additional Information:  9:45 AM  Pt discharging with HC PT/OT. No further needs identified. Family to transport.     RIDGE Perla

## 2025-03-01 NOTE — PROGRESS NOTES
"PRIMARY DIAGNOSIS: \"GENERIC\" NURSING  OUTPATIENT/OBSERVATION GOALS TO BE MET BEFORE DISCHARGE:  ADLs back to baseline: Yes    Activity and level of assistance: Up with standby assistance.    Pain status: Pain free.    Return to near baseline physical activity: Yes     Discharge Planner Nurse   Safe discharge environment identified: Yes  Barriers to discharge: No       Entered by: Cynthia Bran RN 03/01/2025 11:39 AM     Please review provider order for any additional goals.   Nurse to notify provider when observation goals have been met and patient is ready for discharge.  "

## 2025-03-01 NOTE — PROGRESS NOTES
"PRIMARY DIAGNOSIS: \"GENERIC\" NURSING  OUTPATIENT/OBSERVATION GOALS TO BE MET BEFORE DISCHARGE:  ADLs back to baseline: Almost    Activity and level of assistance: Up with 1 assist.     Pain status: Pain free.    Return to near baseline physical activity: Yes     Discharge Planner Nurse   Safe discharge environment identified: Yes  Barriers to discharge: Yes       Entered by: Trey Pereira RN 03/01/2025 7:01 AM     Please review provider order for any additional goals.   Nurse to notify provider when observation goals have been met and patient is ready for discharge.  "

## 2025-03-09 PROBLEM — I48.19 PERSISTENT ATRIAL FIBRILLATION (H): Status: ACTIVE | Noted: 2025-03-09

## 2025-03-09 PROBLEM — I50.30 HEART FAILURE WITH PRESERVED EJECTION FRACTION, NYHA CLASS II (H): Status: ACTIVE | Noted: 2025-03-09

## 2025-03-09 NOTE — PROGRESS NOTES
Thank you, Dr. Mendez, for asking the [facility] team to see Ms. Lilian La to evaluate chronic/longstanding AF.    Assessment/Recommendations     Assessment/Plan:    Diagnoses and all orders for this visit:  Persistent atrial fibrillation (H)  -asymptomatic since diagnosed back in 2020, managed by the [facility] cardiology clinic, Dr. Tejada.  Rate controlled using metoprolol XL 50 mg daily.  No prior history of ablation.   -we discussed the ongoing importance of lifestyle modification (maintaining a healthy weight, sleep apnea diagnosis and management, alcohol avoidance) as part of a long term strategy for atrial fibrillation management      --continue Metoprolol XL 50 mg daily  --continue Eliquis every 12 hours for stroke prevention  --re-establish care with Dr Tejada (cardiology) at [facility], will discuss moderate to severe aortic stenosis plan with him per patient request  (medical management vs valve replacement)   --continue on Furosemide every other day until seen by cardiology at  along with daily weights and avoid added salt to diet  --has follow up in place her her PCP Dr Vergara at [facility] tomorrow, advised to keep that appointment   --no further EP follow up here at [place] is necessary as patient plans on continuing all care in the [facility] system      [id number] score of 4 due to gender, age > 75, HF and on Eliquis. No missed doses since 2/28/25. No bleeding issues reported.       Heart failure with preserved ejection fraction, NYHA class II (H)  --TTE 2/2025 LVEF 55-60%, RV mildly reduced and mildly enlarged. Severe bi-atrial enlargement. Moderate to severe aortic stenosis.   --Initiated on Furosemide during recent hospitalization 2/2025, continue on metoprolol daily  --continue with daily weights, monitor sodium intake  --refer to HF clinic for ongoing management of diuretic therapy. Has not been seen since hospitalization.     Aortic  Stenosis  --moderate to severe on recent TTE  --mild to moderate on TTE back in 2021  --place on Furosemide during recent hospitalization  --recommend she discuss with her cardiology team at [facility], medical management vs valve replacement           History of Present Illness/Subjective     Lilian La is a very pleasant 84 year old female who comes in today for EP consult chronic longstanding AF, referred by Dr Mendez.      Lilian La has a known history of  persistent atrial fibrillation, hypertension, giant cell arteritis on chronic prednisone, hyperlipidemia who presents to clinic to reestablish care and for management of hyperlipidemia.Last evaluated by cardiology, Dr Chris Tejada ([facility] back in 2023)  Prior to seeing Dr Tejada, she was being treated by Dr. Romero/ Cardiology Clinic on 10/06/2015. She was being evaluated for chest discomfort and left arm pain. Cardiac workup with nuclear stress test showed no evidence for ischemia.    Based on Dr Tejada's evaluation, she was found to has mild to moderate aortic stenosis with no signs of HF at that time. HE recommend repeat TTE, initiated patient on Rosuvastatin with follow up lab work 3 months later and she was to return to see him 6 months later, however, patient was lost to follow up or failed to establish a follow up appointment.     She has a longstanding history within the [facility] system.     She follows up today here at [place] after a recent hospitalization 2/25 - 2/28/25 due to UTI, gastroenteritis, history of longstanding AF.     Arrhythmia hx:   Sx: asymptomatic  Sx onset:   Dx/date: 4/23/20 (persistent since)  [id number], OAC: 4 due to gender, age > 75, HF and on Eliquis.  Rate control:metoprolol XL  AAD: no prior AAD use  DCCV: no  Ablation: no  LAAO: no  Device: no      Lilian follows up today with her son posthospitalization.  Patient is planning to continue her care with [facility].  She is  scheduled to follow-up with her internal medicine primary care provider tomorrow at [facility].  She is lost to follow-up with her primary cardiologist Dr. Tejada at [facility], last seen in 2023.   She states that she started to notice worsening shortness of breath on exertion about 1 year ago with ambulation and going up the stairs.  Denies palpitations, chest pain, dizziness or near syncope.  She remains on a rate control strategy consisting of metoprolol XL 50 mg daily, ventricular rates are controlled in the 70s today.  She remains on Eliquis for stroke prevention, she denies any missed doses since she was discharged from the hospital on February 28.  Denies any unprovoked bleeding episodes.  She does ambulate with assist of walker due to her unsteady balance.  She does not weigh herself daily, she is taking her furosemide every other day which was prescribed during her recent hospitalization.  She will occasionally add salt to her meals.  Weight is down 10 pounds since her hospitalization.  She is in no acute distress today, she is able to hold a conversation today without difficulty.  Blood pressure on the softer side today, 89/59.  She was unaware of her advancing aortic stenosis which we discussed today in detail per review of her recent echocardiogram in comparison to her echocardiogram from 2021, performed at [facility].           Cardiographics (personally reviewed):  EKG  2/22/24 AF 92 bpm  10/20/22 AF 86 bpm  4/23/20 AF  bpm  5/12/2019 NSR 89 bpm QT/QTC: 370/450 ms        TTE  2/27/25  1. Left ventricular chamber size is normal. Mild concentric increase in wall  thickness. Systolic function is normal. The visually estimated left  ventricular ejection fraction is 55-60%.  2. Right ventricular chamber size is mildly enlarged. Systolic function is  mildly reduced.  3. Severe biatrial enlargement.  4. Calcified trileaflet aortic valve with moderate-to-severe aortic stenosis  with  paradoxical low flow and low gradient. Peak forward velocity measures 2.7  m/s, mean outflow gradient 19 mmHg, calculated aortic valve area 0.7 cm2 and  dimensionless index 0.28. Trace regurgitation is present.  5. Moderate pulmonary hypertension is present with an estimated pulmonary  artery systolic pressure of 60 mmHg.  6. No prior study available for comparison.    TTE 9/14/2021 ([facility])   1. Echo  9/14/2021 9:03:00 AM.     2. Atrial fibrillation during study.     3. Normal LV size and systolic function, EF 60-65%.     4. Normal RV size and function.     5. Severe LA dilatation.     6. Mild RA dilatation.     7. Aortic valve suboptimally visualized, mild-to-moderate aortic valve   stenosis with a peak velocity of 224.00 cm/s, mean gradient of 11 mmHg, and   aortic valve area of 1.38 cm2.     8. Moderate mitral regurgitation, seems to originate between the A2 and   P2   segments.    9. Mild to moderate tricuspid regurgitation.     10. Estimated RV systolic pressure=47 mmHg + right atrial pressure.        Problem List:  Patient Active Problem List   Diagnosis    Acute cystitis with hematuria    Non-intractable vomiting with nausea, unspecified vomiting type    Acute cystitis    Chills    Rigors    SIRS (systemic inflammatory response syndrome) (H)    Atrial fibrillation with rapid ventricular response (H)    Nausea vomiting and diarrhea    Urinary tract infection without hematuria, site unspecified    Persistent atrial fibrillation (H)    Heart failure with preserved ejection fraction, NYHA class II (H)     Revi  e  Physical Examination Review of Systems   w [facility]  There were no vitals taken for this visit.  There is no height or weight on file to calculate BMI.  Wt Readings from Last 3 Encounters:   02/28/25 62.1 kg (137 lb)   09/12/24 60.3 kg (133 lb)   07/09/23 65.8 kg (145 lb)     General Appearance:   Alert, well-appearing and in no acute distress.   HEENT: Atraumatic, normocephalic.  No scleral  icterus, normal conjunctivae; mucous membranes pink and moist.     Chest: Chest symmetric, spine straight.   Lungs:   Respirations unlabored: Lungs are clear to auscultation.   Cardiovascular:   Normal first and second heart sounds with grade 3 out of 6 systolic murmur auscultated at the right upper sternal border, no rubs, or gallops.  Irregular, rate controlled.   Normal JVD, no edema.       Extremities: No cyanosis or clubbing   Musculoskeletal: Moves all extremities   Skin: Warm, dry, intact.    Neurologic: Mood and affect are appropriate, alert and oriented to person, place, time, and situation.  Ambulates with assist of walker due to unsteady balance.     ROS: 10 point ROS neg other than the symptoms noted above in the HPI.     Medical History  Surgical History Family History Social History     No past medical history on file. No past surgical history on file. No family history on file. History   Smoking Status    Not on file   Smokeless Tobacco    Not on file     Social History    Substance and Sexual Activity      Alcohol use: Not on file       Medications  Allergies     Current Outpatient Medications   Medication Sig Dispense Refill    acetaminophen (TYLENOL) 500 MG tablet Take 1,000 mg by mouth At Bedtime      apixaban ANTICOAGULANT (ELIQUIS) 5 MG tablet Take 5 mg by mouth 2 times daily      Calcium Carb-Cholecalciferol 600-20 MG-MCG TABS Take 1 tablet by mouth daily      cyanocobalamin (VITAMIN B-12) 500 MCG SUBL sublingual tablet Place 500 mcg under the tongue every morning      denosumab (PROLIA) 60 MG/ML SOSY injection Inject 60 mg Subcutaneous every 6 months      diclofenac (VOLTAREN) 1 % topical gel Apply 2 g topically 4 times daily as needed      diphenoxylate-atropine (LOMOTIL) 2.5-0.025 MG tablet Take 1 tablet by mouth 4 times daily as needed for diarrhea.      furosemide (LASIX) 20 MG tablet Take 1 tablet (20 mg) by mouth every other day. 45 tablet 1    glucosamine-chondroitin 500-400 MG CAPS per  "capsule Take 2 capsules by mouth every morning      hydrOXYzine HCl (ATARAX) 10 MG tablet Take 10 mg by mouth 3 times daily as needed for anxiety.      LANsoprazole (PREVACID) 15 MG DR capsule Take 15 mg by mouth every morning      metoprolol succinate ER (TOPROL XL) 50 MG 24 hr tablet Take 50 mg by mouth At Bedtime      polyethylene glycol (MIRALAX) 17 g packet Take 17 g by mouth daily as needed for constipation. 15 packet 1    predniSONE (DELTASONE) 1 MG tablet Take 6 mg by mouth daily. Adjust as directed      sertraline (ZOLOFT) 50 MG tablet Take 50 mg by mouth every morning      traZODone (DESYREL) 50 MG tablet Take 50 mg by mouth At Bedtime        Allergies   Allergen Reactions    Sulfa Antibiotics Other (See Comments)     Passed out. Occurred in childhood      Medical, surgical, family, social history, and medications were all reviewed and updated as necessary.   Lab Results    Chemistry/lipid CBC Cardiac Enzymes/BNP/TSH/INR   Recent Labs   Lab Test 07/02/23  0618   CHOL 284*   HDL 75   *   TRIG 107     Recent Labs   Lab Test 07/02/23  0618   *     Recent Labs   Lab Test 02/26/25  1310 02/25/25  1517   NA  --  137   POTASSIUM 3.8 3.7   CHLORIDE  --  97*   CO2  --  25   GLC  --  132*   BUN  --  19.9   CR  --  0.84   GFRESTIMATED  --  69   YIN  --  9.9     Recent Labs   Lab Test 02/25/25  1517 09/12/24  1624 07/09/23  1405   CR 0.84 0.68 0.87     No results for input(s): \"A1C\" in the last 71712 hours.       Recent Labs   Lab Test 02/25/25  1517   WBC 14.3*   HGB 11.9   HCT 35.7   MCV 95        Recent Labs   Lab Test 02/25/25  1517 09/12/24  1624 07/09/23  1405   HGB 11.9 12.4 10.8*    Recent Labs   Lab Test 07/01/23  2219 09/02/21  2304   TROPONINI 0.02 <0.01     No results for input(s): \"BNP\", \"NTBNPI\", \"NTBNP\" in the last 80316 hours.  No results for input(s): \"TSH\" in the last 72872 hours.  Recent Labs   Lab Test 02/25/25  1517   INR 1.24*          Total Time- 42 minutes spent on date of " encounter doing chart review, history and exam, documentation and further activities as noted above.  This note has been dictated using voice recognition software. Any grammatical, typographical, or context distortions are unintentional and inherent to the software.    [facility]  [phone]

## 2025-03-10 ENCOUNTER — OFFICE VISIT (OUTPATIENT)
Dept: CARDIOLOGY | Facility: CLINIC | Age: 85
End: 2025-03-10
Attending: FAMILY MEDICINE
Payer: COMMERCIAL

## 2025-03-10 VITALS
WEIGHT: 127.5 LBS | HEIGHT: 67 IN | HEART RATE: 76 BPM | RESPIRATION RATE: 16 BRPM | SYSTOLIC BLOOD PRESSURE: 89 MMHG | BODY MASS INDEX: 20.01 KG/M2 | OXYGEN SATURATION: 86 % | DIASTOLIC BLOOD PRESSURE: 59 MMHG

## 2025-03-10 DIAGNOSIS — I50.30 HEART FAILURE WITH PRESERVED EJECTION FRACTION, NYHA CLASS II (H): ICD-10-CM

## 2025-03-10 DIAGNOSIS — I35.0 NONRHEUMATIC AORTIC VALVE STENOSIS: ICD-10-CM

## 2025-03-10 DIAGNOSIS — I48.19 PERSISTENT ATRIAL FIBRILLATION (H): Primary | ICD-10-CM

## 2025-03-10 DIAGNOSIS — I48.91 ATRIAL FIBRILLATION WITH RAPID VENTRICULAR RESPONSE (H): ICD-10-CM

## 2025-03-10 PROCEDURE — 3078F DIAST BP <80 MM HG: CPT | Performed by: NURSE PRACTITIONER

## 2025-03-10 PROCEDURE — 3074F SYST BP LT 130 MM HG: CPT | Performed by: NURSE PRACTITIONER

## 2025-03-10 PROCEDURE — G2211 COMPLEX E/M VISIT ADD ON: HCPCS | Performed by: NURSE PRACTITIONER

## 2025-03-10 PROCEDURE — 99203 OFFICE O/P NEW LOW 30 MIN: CPT | Performed by: NURSE PRACTITIONER

## 2025-03-10 NOTE — PATIENT INSTRUCTIONS
Lilian La,    It was a pleasure to see you today at the [facility].     My recommendations after this visit include:    --continue Metoprolol XL 50 mg daily  --continue Eliquis every 12 hours for stroke prevention  --re-establish care with Dr Tejada (cardiology) at [facility], will discuss moderate to severe aortic stenosis plan with him per patient request  (medical management vs valve replacement)   --continue on Furosemide every other day until seen by cardiology along with daily weight and avoid added salt to diet  --has follow up in place her her PCP Dr Vergara at [facility] tomorrow, advised to keep that appointment   --no further EP follow up at [place] needed at this time. [facility] cardiology to manage    Evi Trejo CNP  [facility], Electrophysiology  [phone]  EP nurses [phone]

## 2025-03-10 NOTE — LETTER
3/10/2025    JAZMYNE JOHNSON MD  [address] [facility] [address]    RE: Lilian La       Dear Colleague,     I had the pleasure of seeing Lilian La in the [facility].    Thank you, Dr. Mendez, for asking the [facility] team to see Ms. Lilian La to evaluate chronic/longstanding AF.    Assessment/Recommendations     Assessment/Plan:    Diagnoses and all orders for this visit:  Persistent atrial fibrillation (H)  -asymptomatic since diagnosed back in 2020, managed by the [facility] cardiology clinic, Dr. Tejada.  Rate controlled using metoprolol XL 50 mg daily.  No prior history of ablation.   -we discussed the ongoing importance of lifestyle modification (maintaining a healthy weight, sleep apnea diagnosis and management, alcohol avoidance) as part of a long term strategy for atrial fibrillation management      --continue Metoprolol XL 50 mg daily  --continue Eliquis every 12 hours for stroke prevention  --re-establish care with Dr Tejada (cardiology) at [facility], will discuss moderate to severe aortic stenosis plan with him per patient request  (medical management vs valve replacement)   --continue on Furosemide every other day until seen by cardiology at  along with daily weights and avoid added salt to diet  --has follow up in place her her PCP Dr Johnson at [facility] tomorrow, advised to keep that appointment   --no further EP follow up here at [place] is necessary as patient plans on continuing all care in the [facility] system      [id number] score of 4 due to gender, age > 75, HF and on Eliquis. No missed doses since 2/28/25. No bleeding issues reported.       Heart failure with preserved ejection fraction, NYHA class II (H)  --TTE 2/2025 LVEF 55-60%, RV mildly reduced and mildly enlarged. Severe bi-atrial enlargement. Moderate to severe aortic stenosis.   --Initiated on Furosemide during recent hospitalization 2/2025,  continue on metoprolol daily  --continue with daily weights, monitor sodium intake  --refer to HF clinic for ongoing management of diuretic therapy. Has not been seen since hospitalization.     Aortic Stenosis  --moderate to severe on recent TTE  --mild to moderate on TTE back in 2021  --place on Furosemide during recent hospitalization  --recommend she discuss with her cardiology team at [facility], medical management vs valve replacement           History of Present Illness/Subjective     Lilian La is a very pleasant 84 year old female who comes in today for EP consult chronic longstanding AF, referred by Dr Mendez.      Lilian La has a known history of  persistent atrial fibrillation, hypertension, giant cell arteritis on chronic prednisone, hyperlipidemia who presents to clinic to reestablish care and for management of hyperlipidemia.Last evaluated by cardiology, Dr Chris Tejada ([facility] back in 2023)  Prior to seeing Dr Tejada, she was being treated by Dr. Romero/ Cardiology Clinic on 10/06/2015. She was being evaluated for chest discomfort and left arm pain. Cardiac workup with nuclear stress test showed no evidence for ischemia.    Based on Dr Tejada's evaluation, she was found to has mild to moderate aortic stenosis with no signs of HF at that time. HE recommend repeat TTE, initiated patient on Rosuvastatin with follow up lab work 3 months later and she was to return to see him 6 months later, however, patient was lost to follow up or failed to establish a follow up appointment.     She has a longstanding history within the [facility] system.     She follows up today here at [place] after a recent hospitalization 2/25 - 2/28/25 due to UTI, gastroenteritis, history of longstanding AF.     Arrhythmia hx:   Sx: asymptomatic  Sx onset:   Dx/date: 4/23/20 (persistent since)  [id number], OAC: 4 due to gender, age > 75, HF and on Eliquis.  Rate control:metoprolol XL  AAD: no  prior AAD use  DCCV: no  Ablation: no  LAAO: no  Device: no      Lilian follows up today with her son posthospitalization.  Patient is planning to continue her care with [facility].  She is scheduled to follow-up with her internal medicine primary care provider tomorrow at [facility].  She is lost to follow-up with her primary cardiologist Dr. Tejada at [facility], last seen in 2023.   She states that she started to notice worsening shortness of breath on exertion about 1 year ago with ambulation and going up the stairs.  Denies palpitations, chest pain, dizziness or near syncope.  She remains on a rate control strategy consisting of metoprolol XL 50 mg daily, ventricular rates are controlled in the 70s today.  She remains on Eliquis for stroke prevention, she denies any missed doses since she was discharged from the hospital on February 28.  Denies any unprovoked bleeding episodes.  She does ambulate with assist of walker due to her unsteady balance.  She does not weigh herself daily, she is taking her furosemide every other day which was prescribed during her recent hospitalization.  She will occasionally add salt to her meals.  Weight is down 10 pounds since her hospitalization.  She is in no acute distress today, she is able to hold a conversation today without difficulty.  Blood pressure on the softer side today, 89/59.  She was unaware of her advancing aortic stenosis which we discussed today in detail per review of her recent echocardiogram in comparison to her echocardiogram from 2021, performed at [facility].           Cardiographics (personally reviewed):  EKG  2/22/24 AF 92 bpm  10/20/22 AF 86 bpm  4/23/20 AF  bpm  5/12/2019 NSR 89 bpm QT/QTC: 370/450 ms        TTE  2/27/25  1. Left ventricular chamber size is normal. Mild concentric increase in wall  thickness. Systolic function is normal. The visually estimated left  ventricular ejection fraction is 55-60%.  2. Right  ventricular chamber size is mildly enlarged. Systolic function is  mildly reduced.  3. Severe biatrial enlargement.  4. Calcified trileaflet aortic valve with moderate-to-severe aortic stenosis  with paradoxical low flow and low gradient. Peak forward velocity measures 2.7  m/s, mean outflow gradient 19 mmHg, calculated aortic valve area 0.7 cm2 and  dimensionless index 0.28. Trace regurgitation is present.  5. Moderate pulmonary hypertension is present with an estimated pulmonary  artery systolic pressure of 60 mmHg.  6. No prior study available for comparison.    TTE 9/14/2021 ([facility])   1. Echo  9/14/2021 9:03:00 AM.     2. Atrial fibrillation during study.     3. Normal LV size and systolic function, EF 60-65%.     4. Normal RV size and function.     5. Severe LA dilatation.     6. Mild RA dilatation.     7. Aortic valve suboptimally visualized, mild-to-moderate aortic valve   stenosis with a peak velocity of 224.00 cm/s, mean gradient of 11 mmHg, and   aortic valve area of 1.38 cm2.     8. Moderate mitral regurgitation, seems to originate between the A2 and   P2   segments.    9. Mild to moderate tricuspid regurgitation.     10. Estimated RV systolic pressure=47 mmHg + right atrial pressure.        Problem List:  Patient Active Problem List   Diagnosis     Acute cystitis with hematuria     Non-intractable vomiting with nausea, unspecified vomiting type     Acute cystitis     Chills     Rigors     SIRS (systemic inflammatory response syndrome) (H)     Atrial fibrillation with rapid ventricular response (H)     Nausea vomiting and diarrhea     Urinary tract infection without hematuria, site unspecified     Persistent atrial fibrillation (H)     Heart failure with preserved ejection fraction, NYHA class II (H)     Revi  e  Physical Examination Review of Systems   w [facility]  There were no vitals taken for this visit.  There is no height or weight on file to calculate BMI.  Wt Readings from Last 3  Encounters:   02/28/25 62.1 kg (137 lb)   09/12/24 60.3 kg (133 lb)   07/09/23 65.8 kg (145 lb)     General Appearance:   Alert, well-appearing and in no acute distress.   HEENT: Atraumatic, normocephalic.  No scleral icterus, normal conjunctivae; mucous membranes pink and moist.     Chest: Chest symmetric, spine straight.   Lungs:   Respirations unlabored: Lungs are clear to auscultation.   Cardiovascular:   Normal first and second heart sounds with grade 3 out of 6 systolic murmur auscultated at the right upper sternal border, no rubs, or gallops.  Irregular, rate controlled.   Normal JVD, no edema.       Extremities: No cyanosis or clubbing   Musculoskeletal: Moves all extremities   Skin: Warm, dry, intact.    Neurologic: Mood and affect are appropriate, alert and oriented to person, place, time, and situation.  Ambulates with assist of walker due to unsteady balance.     ROS: 10 point ROS neg other than the symptoms noted above in the HPI.     Medical History  Surgical History Family History Social History     No past medical history on file. No past surgical history on file. No family history on file. History   Smoking Status     Not on file   Smokeless Tobacco     Not on file     Social History    Substance and Sexual Activity      Alcohol use: Not on file       Medications  Allergies     Current Outpatient Medications   Medication Sig Dispense Refill     acetaminophen (TYLENOL) 500 MG tablet Take 1,000 mg by mouth At Bedtime       apixaban ANTICOAGULANT (ELIQUIS) 5 MG tablet Take 5 mg by mouth 2 times daily       Calcium Carb-Cholecalciferol 600-20 MG-MCG TABS Take 1 tablet by mouth daily       cyanocobalamin (VITAMIN B-12) 500 MCG SUBL sublingual tablet Place 500 mcg under the tongue every morning       denosumab (PROLIA) 60 MG/ML SOSY injection Inject 60 mg Subcutaneous every 6 months       diclofenac (VOLTAREN) 1 % topical gel Apply 2 g topically 4 times daily as needed       diphenoxylate-atropine  "(LOMOTIL) 2.5-0.025 MG tablet Take 1 tablet by mouth 4 times daily as needed for diarrhea.       furosemide (LASIX) 20 MG tablet Take 1 tablet (20 mg) by mouth every other day. 45 tablet 1     glucosamine-chondroitin 500-400 MG CAPS per capsule Take 2 capsules by mouth every morning       hydrOXYzine HCl (ATARAX) 10 MG tablet Take 10 mg by mouth 3 times daily as needed for anxiety.       LANsoprazole (PREVACID) 15 MG DR capsule Take 15 mg by mouth every morning       metoprolol succinate ER (TOPROL XL) 50 MG 24 hr tablet Take 50 mg by mouth At Bedtime       polyethylene glycol (MIRALAX) 17 g packet Take 17 g by mouth daily as needed for constipation. 15 packet 1     predniSONE (DELTASONE) 1 MG tablet Take 6 mg by mouth daily. Adjust as directed       sertraline (ZOLOFT) 50 MG tablet Take 50 mg by mouth every morning       traZODone (DESYREL) 50 MG tablet Take 50 mg by mouth At Bedtime        Allergies   Allergen Reactions     Sulfa Antibiotics Other (See Comments)     Passed out. Occurred in childhood      Medical, surgical, family, social history, and medications were all reviewed and updated as necessary.   Lab Results    Chemistry/lipid CBC Cardiac Enzymes/BNP/TSH/INR   Recent Labs   Lab Test 07/02/23  0618   CHOL 284*   HDL 75   *   TRIG 107     Recent Labs   Lab Test 07/02/23  0618   *     Recent Labs   Lab Test 02/26/25  1310 02/25/25  1517   NA  --  137   POTASSIUM 3.8 3.7   CHLORIDE  --  97*   CO2  --  25   GLC  --  132*   BUN  --  19.9   CR  --  0.84   GFRESTIMATED  --  69   YIN  --  9.9     Recent Labs   Lab Test 02/25/25  1517 09/12/24  1624 07/09/23  1405   CR 0.84 0.68 0.87     No results for input(s): \"A1C\" in the last 86260 hours.       Recent Labs   Lab Test 02/25/25  1517   WBC 14.3*   HGB 11.9   HCT 35.7   MCV 95        Recent Labs   Lab Test 02/25/25  1517 09/12/24  1624 07/09/23  1405   HGB 11.9 12.4 10.8*    Recent Labs   Lab Test 07/01/23  2219 09/02/21  2304   TROPONINI " "0.02 <0.01     No results for input(s): \"BNP\", \"NTBNPI\", \"NTBNP\" in the last 49750 hours.  No results for input(s): \"TSH\" in the last 73714 hours.  Recent Labs   Lab Test 02/25/25  1517   INR 1.24*          Total Time- 42 minutes spent on date of encounter doing chart review, history and exam, documentation and further activities as noted above.  This note has been dictated using voice recognition software. Any grammatical, typographical, or context distortions are unintentional and inherent to the software.    Evi Trejo [facility]  [phone]                         Thank you for allowing me to participate in the care of your patient.      Sincerely,     Evi Trejo [facility]  cc:   Saira Del Rio MD  [address] [place] [address]      "